# Patient Record
Sex: FEMALE | Race: WHITE | Employment: OTHER | URBAN - METROPOLITAN AREA
[De-identification: names, ages, dates, MRNs, and addresses within clinical notes are randomized per-mention and may not be internally consistent; named-entity substitution may affect disease eponyms.]

---

## 2017-03-09 ENCOUNTER — HOSPITAL ENCOUNTER (OUTPATIENT)
Dept: OTHER | Age: 67
Discharge: HOME | End: 2017-03-09
Attending: SURGERY | Admitting: SURGERY

## 2017-03-09 DIAGNOSIS — R52 PAIN: ICD-10-CM

## 2017-03-10 ENCOUNTER — HOSPITAL ENCOUNTER (OUTPATIENT)
Dept: VASCULAR LAB | Age: 67
Discharge: HOME | End: 2017-03-10
Attending: SURGERY | Admitting: SURGERY

## 2017-03-10 DIAGNOSIS — M79.604 PAIN OF RIGHT LEG: ICD-10-CM

## 2017-09-14 ENCOUNTER — HOSPITAL ENCOUNTER (OUTPATIENT)
Dept: VASCULAR LAB | Age: 67
Discharge: HOME | End: 2017-09-14
Attending: SURGERY | Admitting: SURGERY

## 2017-09-14 DIAGNOSIS — I73.9 PERIPHERAL VASCULAR DISEASE (HCC): ICD-10-CM

## 2017-11-30 ENCOUNTER — HOSPITAL ENCOUNTER (OUTPATIENT)
Dept: VASCULAR LAB | Age: 67
Discharge: HOME | End: 2017-11-30
Attending: SURGERY | Admitting: SURGERY

## 2017-11-30 DIAGNOSIS — M54.50 LOW BACK PAIN RADIATING TO BOTH LEGS: ICD-10-CM

## 2017-11-30 DIAGNOSIS — I73.9 PERIPHERAL VASCULAR DISEASE (HCC): ICD-10-CM

## 2017-11-30 DIAGNOSIS — M79.605 LOW BACK PAIN RADIATING TO BOTH LEGS: ICD-10-CM

## 2017-11-30 DIAGNOSIS — M79.604 LOW BACK PAIN RADIATING TO BOTH LEGS: ICD-10-CM

## 2019-10-07 ENCOUNTER — TELEPHONE (OUTPATIENT)
Dept: CARDIOLOGY CLINIC | Age: 69
End: 2019-10-07

## 2019-10-16 ENCOUNTER — HOSPITAL ENCOUNTER (OUTPATIENT)
Dept: VASCULAR LAB | Age: 69
Discharge: HOME | End: 2019-10-16
Payer: MEDICARE

## 2019-10-16 PROCEDURE — 93925 LOWER EXTREMITY STUDY: CPT

## 2019-10-18 ENCOUNTER — OFFICE VISIT (OUTPATIENT)
Dept: CARDIOLOGY CLINIC | Age: 69
End: 2019-10-18
Payer: MEDICARE

## 2019-10-18 VITALS
BODY MASS INDEX: 30.82 KG/M2 | DIASTOLIC BLOOD PRESSURE: 80 MMHG | WEIGHT: 185 LBS | HEIGHT: 65 IN | SYSTOLIC BLOOD PRESSURE: 152 MMHG | OXYGEN SATURATION: 98 % | HEART RATE: 94 BPM

## 2019-10-18 DIAGNOSIS — I73.9 INTERMITTENT CLAUDICATION (HCC): ICD-10-CM

## 2019-10-18 DIAGNOSIS — I70.219 ATHEROSCLEROSIS OF NATIVE ARTERY OF EXTREMITY WITH INTERMITTENT CLAUDICATION, UNSPECIFIED EXTREMITY (HCC): ICD-10-CM

## 2019-10-18 DIAGNOSIS — R93.6 ABNORMAL FINDINGS ON DIAGNOSTIC IMAGING OF LIMBS: ICD-10-CM

## 2019-10-18 DIAGNOSIS — I73.9 PAD (PERIPHERAL ARTERY DISEASE) (HCC): ICD-10-CM

## 2019-10-18 DIAGNOSIS — I70.229 CRITICAL LOWER LIMB ISCHEMIA (HCC): Primary | ICD-10-CM

## 2019-10-18 PROCEDURE — 99205 OFFICE O/P NEW HI 60 MIN: CPT | Performed by: INTERNAL MEDICINE

## 2019-10-21 ENCOUNTER — HOSPITAL ENCOUNTER (INPATIENT)
Dept: CARDIAC CATH/INVASIVE PROCEDURES | Age: 69
LOS: 2 days | Discharge: HOME | DRG: 271 | End: 2019-10-23
Attending: INTERNAL MEDICINE | Admitting: INTERNAL MEDICINE
Payer: MEDICARE

## 2019-10-21 DIAGNOSIS — I70.229 CRITICAL LOWER LIMB ISCHEMIA (HCC): ICD-10-CM

## 2019-10-21 PROBLEM — I73.9 PAD (PERIPHERAL ARTERY DISEASE) (HCC): Status: ACTIVE | Noted: 2019-10-21

## 2019-10-21 LAB
APTT: 177.1 SEC (ref 26–36)
APTT: 60.3 SEC (ref 26–36)
CALCIUM IONIZED: 1.24 MMOL/L (ref 1.12–1.32)
FIBRINOGEN: 152 MG/DL (ref 200–397)
FIBRINOGEN: 291 MG/DL (ref 200–397)
GFR AFRICAN AMERICAN: >60
GFR NON-AFRICAN AMERICAN: 55
GLUCOSE BLD-MCNC: 98 MG/DL (ref 70–99)
HCT VFR BLD CALC: 38.7 % (ref 36–48)
HCT VFR BLD CALC: 41 % (ref 36–48)
HCT VFR BLD CALC: 41.3 % (ref 36–48)
HCT VFR BLD CALC: 43.6 % (ref 36–48)
HEMOGLOBIN: 13.2 G/DL (ref 12–16)
HEMOGLOBIN: 13.9 G/DL (ref 12–16)
HEMOGLOBIN: 14 G/DL (ref 12–16)
HEMOGLOBIN: 15 G/DL (ref 12–16)
INR BLD: 1.07 (ref 0.86–1.14)
INR BLD: 1.08 (ref 0.86–1.14)
MCH RBC QN AUTO: 30.6 PG (ref 26–34)
MCH RBC QN AUTO: 30.9 PG (ref 26–34)
MCH RBC QN AUTO: 30.9 PG (ref 26–34)
MCH RBC QN AUTO: 31.1 PG (ref 26–34)
MCHC RBC AUTO-ENTMCNC: 33.5 G/DL (ref 31–36)
MCHC RBC AUTO-ENTMCNC: 34.1 G/DL (ref 31–36)
MCHC RBC AUTO-ENTMCNC: 34.3 G/DL (ref 31–36)
MCHC RBC AUTO-ENTMCNC: 34.4 G/DL (ref 31–36)
MCV RBC AUTO: 90 FL (ref 80–100)
MCV RBC AUTO: 90.6 FL (ref 80–100)
MCV RBC AUTO: 90.6 FL (ref 80–100)
MCV RBC AUTO: 91.2 FL (ref 80–100)
PDW BLD-RTO: 14.1 % (ref 12.4–15.4)
PDW BLD-RTO: 14.1 % (ref 12.4–15.4)
PDW BLD-RTO: 14.3 % (ref 12.4–15.4)
PDW BLD-RTO: 14.5 % (ref 12.4–15.4)
PERFORMED ON: ABNORMAL
PLATELET # BLD: 153 K/UL (ref 135–450)
PLATELET # BLD: 214 K/UL (ref 135–450)
PLATELET # BLD: 225 K/UL (ref 135–450)
PLATELET # BLD: 235 K/UL (ref 135–450)
PMV BLD AUTO: 7.7 FL (ref 5–10.5)
PMV BLD AUTO: 7.8 FL (ref 5–10.5)
PMV BLD AUTO: 7.9 FL (ref 5–10.5)
PMV BLD AUTO: 8.1 FL (ref 5–10.5)
POC ACT LR: 291 SEC
POC CHLORIDE: 106 MMOL/L (ref 99–110)
POC CREATININE: 1 MG/DL (ref 0.6–1.2)
POC POTASSIUM: 3.9 MMOL/L (ref 3.5–5.1)
POC SAMPLE TYPE: ABNORMAL
POC SODIUM: 142 MMOL/L (ref 136–145)
PROTHROMBIN TIME: 12.2 SEC (ref 9.8–13)
PROTHROMBIN TIME: 12.3 SEC (ref 9.8–13)
RBC # BLD: 4.27 M/UL (ref 4–5.2)
RBC # BLD: 4.52 M/UL (ref 4–5.2)
RBC # BLD: 4.53 M/UL (ref 4–5.2)
RBC # BLD: 4.85 M/UL (ref 4–5.2)
REASON FOR REJECTION: NORMAL
REJECTED TEST: NORMAL
WBC # BLD: 7.8 K/UL (ref 4–11)
WBC # BLD: 7.9 K/UL (ref 4–11)
WBC # BLD: 8 K/UL (ref 4–11)
WBC # BLD: 8.8 K/UL (ref 4–11)

## 2019-10-21 PROCEDURE — 37211 THROMBOLYTIC ART THERAPY: CPT

## 2019-10-21 PROCEDURE — 2580000003 HC RX 258

## 2019-10-21 PROCEDURE — 85730 THROMBOPLASTIN TIME PARTIAL: CPT

## 2019-10-21 PROCEDURE — 75710 ARTERY X-RAYS ARM/LEG: CPT | Performed by: INTERNAL MEDICINE

## 2019-10-21 PROCEDURE — 37184 PRIM ART M-THRMBC 1ST VSL: CPT | Performed by: INTERNAL MEDICINE

## 2019-10-21 PROCEDURE — B4101ZZ FLUOROSCOPY OF ABDOMINAL AORTA USING LOW OSMOLAR CONTRAST: ICD-10-PCS | Performed by: INTERNAL MEDICINE

## 2019-10-21 PROCEDURE — 85610 PROTHROMBIN TIME: CPT

## 2019-10-21 PROCEDURE — 75625 CONTRAST EXAM ABDOMINL AORTA: CPT | Performed by: INTERNAL MEDICINE

## 2019-10-21 PROCEDURE — 2100000000 HC CCU R&B

## 2019-10-21 PROCEDURE — 6370000000 HC RX 637 (ALT 250 FOR IP): Performed by: INTERNAL MEDICINE

## 2019-10-21 PROCEDURE — 85027 COMPLETE CBC AUTOMATED: CPT

## 2019-10-21 PROCEDURE — 2709999900 HC NON-CHARGEABLE SUPPLY

## 2019-10-21 PROCEDURE — 04CN3ZZ EXTIRPATION OF MATTER FROM LEFT POPLITEAL ARTERY, PERCUTANEOUS APPROACH: ICD-10-PCS | Performed by: INTERNAL MEDICINE

## 2019-10-21 PROCEDURE — C1751 CATH, INF, PER/CENT/MIDLINE: HCPCS

## 2019-10-21 PROCEDURE — 2580000003 HC RX 258: Performed by: INTERNAL MEDICINE

## 2019-10-21 PROCEDURE — C1894 INTRO/SHEATH, NON-LASER: HCPCS

## 2019-10-21 PROCEDURE — C1757 CATH, THROMBECTOMY/EMBOLECT: HCPCS

## 2019-10-21 PROCEDURE — 36247 INS CATH ABD/L-EXT ART 3RD: CPT

## 2019-10-21 PROCEDURE — 6360000004 HC RX CONTRAST MEDICATION: Performed by: INTERNAL MEDICINE

## 2019-10-21 PROCEDURE — 75625 CONTRAST EXAM ABDOMINL AORTA: CPT

## 2019-10-21 PROCEDURE — 2500000003 HC RX 250 WO HCPCS

## 2019-10-21 PROCEDURE — 82330 ASSAY OF CALCIUM: CPT

## 2019-10-21 PROCEDURE — 6360000002 HC RX W HCPCS

## 2019-10-21 PROCEDURE — 84295 ASSAY OF SERUM SODIUM: CPT

## 2019-10-21 PROCEDURE — 6360000002 HC RX W HCPCS: Performed by: INTERNAL MEDICINE

## 2019-10-21 PROCEDURE — 99152 MOD SED SAME PHYS/QHP 5/>YRS: CPT

## 2019-10-21 PROCEDURE — 75710 ARTERY X-RAYS ARM/LEG: CPT

## 2019-10-21 PROCEDURE — 82565 ASSAY OF CREATININE: CPT

## 2019-10-21 PROCEDURE — 3E05317 INTRODUCTION OF OTHER THROMBOLYTIC INTO PERIPHERAL ARTERY, PERCUTANEOUS APPROACH: ICD-10-PCS | Performed by: INTERNAL MEDICINE

## 2019-10-21 PROCEDURE — 82947 ASSAY GLUCOSE BLOOD QUANT: CPT

## 2019-10-21 PROCEDURE — 84132 ASSAY OF SERUM POTASSIUM: CPT

## 2019-10-21 PROCEDURE — 85384 FIBRINOGEN ACTIVITY: CPT

## 2019-10-21 PROCEDURE — 04CL3ZZ EXTIRPATION OF MATTER FROM LEFT FEMORAL ARTERY, PERCUTANEOUS APPROACH: ICD-10-PCS | Performed by: INTERNAL MEDICINE

## 2019-10-21 PROCEDURE — B41G1ZZ FLUOROSCOPY OF LEFT LOWER EXTREMITY ARTERIES USING LOW OSMOLAR CONTRAST: ICD-10-PCS | Performed by: INTERNAL MEDICINE

## 2019-10-21 PROCEDURE — 82435 ASSAY OF BLOOD CHLORIDE: CPT

## 2019-10-21 PROCEDURE — 75774 ARTERY X-RAY EACH VESSEL: CPT

## 2019-10-21 PROCEDURE — C1769 GUIDE WIRE: HCPCS

## 2019-10-21 PROCEDURE — 85347 COAGULATION TIME ACTIVATED: CPT

## 2019-10-21 PROCEDURE — 99153 MOD SED SAME PHYS/QHP EA: CPT

## 2019-10-21 PROCEDURE — C1884 EMBOLIZATION PROTECT SYST: HCPCS

## 2019-10-21 PROCEDURE — C1887 CATHETER, GUIDING: HCPCS

## 2019-10-21 RX ORDER — NICOTINE 21 MG/24HR
1 PATCH, TRANSDERMAL 24 HOURS TRANSDERMAL DAILY
Status: DISCONTINUED | OUTPATIENT
Start: 2019-10-21 | End: 2019-10-23 | Stop reason: HOSPADM

## 2019-10-21 RX ORDER — MORPHINE SULFATE 2 MG/ML
2 INJECTION, SOLUTION INTRAMUSCULAR; INTRAVENOUS EVERY 4 HOURS PRN
Status: DISCONTINUED | OUTPATIENT
Start: 2019-10-21 | End: 2019-10-23 | Stop reason: HOSPADM

## 2019-10-21 RX ORDER — CLOPIDOGREL BISULFATE 75 MG/1
75 TABLET ORAL DAILY
Status: DISCONTINUED | OUTPATIENT
Start: 2019-10-22 | End: 2019-10-23 | Stop reason: HOSPADM

## 2019-10-21 RX ORDER — PRAVASTATIN SODIUM 40 MG
40 TABLET ORAL DAILY
Status: DISCONTINUED | OUTPATIENT
Start: 2019-10-22 | End: 2019-10-23 | Stop reason: HOSPADM

## 2019-10-21 RX ORDER — HYDROCODONE BITARTRATE AND ACETAMINOPHEN 5; 325 MG/1; MG/1
1 TABLET ORAL EVERY 4 HOURS PRN
Status: DISCONTINUED | OUTPATIENT
Start: 2019-10-21 | End: 2019-10-23 | Stop reason: HOSPADM

## 2019-10-21 RX ORDER — CILOSTAZOL 50 MG/1
100 TABLET ORAL 2 TIMES DAILY
Status: DISCONTINUED | OUTPATIENT
Start: 2019-10-21 | End: 2019-10-23 | Stop reason: HOSPADM

## 2019-10-21 RX ORDER — CIPROFLOXACIN 500 MG/1
500 TABLET, FILM COATED ORAL 2 TIMES DAILY
Status: DISCONTINUED | OUTPATIENT
Start: 2019-10-21 | End: 2019-10-23 | Stop reason: HOSPADM

## 2019-10-21 RX ORDER — LOSARTAN POTASSIUM AND HYDROCHLOROTHIAZIDE 12.5; 5 MG/1; MG/1
2 TABLET ORAL DAILY
Status: DISCONTINUED | OUTPATIENT
Start: 2019-10-22 | End: 2019-10-23 | Stop reason: HOSPADM

## 2019-10-21 RX ORDER — AMLODIPINE BESYLATE 10 MG/1
10 TABLET ORAL DAILY
Status: DISCONTINUED | OUTPATIENT
Start: 2019-10-22 | End: 2019-10-23 | Stop reason: HOSPADM

## 2019-10-21 RX ORDER — HEPARIN SODIUM AND DEXTROSE 10000; 5 [USP'U]/100ML; G/100ML
500 INJECTION INTRAVENOUS CONTINUOUS
Status: DISCONTINUED | OUTPATIENT
Start: 2019-10-21 | End: 2019-10-22

## 2019-10-21 RX ORDER — SODIUM CHLORIDE 9 MG/ML
INJECTION, SOLUTION INTRAVENOUS CONTINUOUS
Status: DISCONTINUED | OUTPATIENT
Start: 2019-10-21 | End: 2019-10-23 | Stop reason: HOSPADM

## 2019-10-21 RX ORDER — PIOGLITAZONEHYDROCHLORIDE 15 MG/1
30 TABLET ORAL DAILY
Status: DISCONTINUED | OUTPATIENT
Start: 2019-10-22 | End: 2019-10-23 | Stop reason: HOSPADM

## 2019-10-21 RX ADMIN — HEPARIN SODIUM 500 UNITS/HR: 10000 INJECTION, SOLUTION INTRAVENOUS at 17:17

## 2019-10-21 RX ADMIN — IOPAMIDOL 120 ML: 755 INJECTION, SOLUTION INTRAVENOUS at 16:00

## 2019-10-21 RX ADMIN — HYDROCODONE BITARTRATE AND ACETAMINOPHEN 1 TABLET: 5; 325 TABLET ORAL at 19:52

## 2019-10-21 RX ADMIN — SODIUM CHLORIDE: 9 INJECTION, SOLUTION INTRAVENOUS at 17:17

## 2019-10-21 RX ADMIN — MORPHINE SULFATE 2 MG: 2 INJECTION, SOLUTION INTRAMUSCULAR; INTRAVENOUS at 21:39

## 2019-10-21 RX ADMIN — CIPROFLOXACIN HYDROCHLORIDE 500 MG: 500 TABLET, FILM COATED ORAL at 21:14

## 2019-10-21 RX ADMIN — CILOSTAZOL 100 MG: 50 TABLET ORAL at 21:14

## 2019-10-21 ASSESSMENT — PAIN DESCRIPTION - LOCATION
LOCATION: FOOT

## 2019-10-21 ASSESSMENT — PAIN DESCRIPTION - PAIN TYPE
TYPE: ACUTE PAIN

## 2019-10-21 ASSESSMENT — PAIN SCALES - GENERAL
PAINLEVEL_OUTOF10: 8
PAINLEVEL_OUTOF10: 7
PAINLEVEL_OUTOF10: 3
PAINLEVEL_OUTOF10: 4
PAINLEVEL_OUTOF10: 3

## 2019-10-21 ASSESSMENT — PAIN DESCRIPTION - ORIENTATION
ORIENTATION: LEFT

## 2019-10-21 ASSESSMENT — PAIN DESCRIPTION - DESCRIPTORS
DESCRIPTORS: BURNING

## 2019-10-22 LAB
APTT: 45 SEC (ref 26–36)
APTT: 51.3 SEC (ref 26–36)
FIBRINOGEN: 110 MG/DL (ref 200–397)
GLUCOSE BLD-MCNC: 117 MG/DL (ref 70–99)
GLUCOSE BLD-MCNC: 124 MG/DL (ref 70–99)
HCT VFR BLD CALC: 32.6 % (ref 36–48)
HCT VFR BLD CALC: 34.9 % (ref 36–48)
HCT VFR BLD CALC: 37.1 % (ref 36–48)
HEMOGLOBIN: 11.2 G/DL (ref 12–16)
HEMOGLOBIN: 11.9 G/DL (ref 12–16)
HEMOGLOBIN: 12.6 G/DL (ref 12–16)
INR BLD: 1.13 (ref 0.86–1.14)
INR BLD: 1.15 (ref 0.86–1.14)
INR BLD: 1.15 (ref 0.86–1.14)
MCH RBC QN AUTO: 30.8 PG (ref 26–34)
MCH RBC QN AUTO: 31 PG (ref 26–34)
MCH RBC QN AUTO: 31.1 PG (ref 26–34)
MCHC RBC AUTO-ENTMCNC: 34 G/DL (ref 31–36)
MCHC RBC AUTO-ENTMCNC: 34 G/DL (ref 31–36)
MCHC RBC AUTO-ENTMCNC: 34.4 G/DL (ref 31–36)
MCV RBC AUTO: 90.2 FL (ref 80–100)
MCV RBC AUTO: 90.8 FL (ref 80–100)
MCV RBC AUTO: 91.4 FL (ref 80–100)
PDW BLD-RTO: 14.1 % (ref 12.4–15.4)
PDW BLD-RTO: 14.5 % (ref 12.4–15.4)
PDW BLD-RTO: 14.6 % (ref 12.4–15.4)
PERFORMED ON: ABNORMAL
PERFORMED ON: ABNORMAL
PLATELET # BLD: 116 K/UL (ref 135–450)
PLATELET # BLD: 126 K/UL (ref 135–450)
PLATELET # BLD: 140 K/UL (ref 135–450)
PMV BLD AUTO: 7.6 FL (ref 5–10.5)
PMV BLD AUTO: 7.8 FL (ref 5–10.5)
PMV BLD AUTO: 7.9 FL (ref 5–10.5)
POC ACT LR: 184 SEC
PROTHROMBIN TIME: 12.9 SEC (ref 9.8–13)
PROTHROMBIN TIME: 13.1 SEC (ref 9.8–13)
PROTHROMBIN TIME: 13.1 SEC (ref 9.8–13)
RBC # BLD: 3.61 M/UL (ref 4–5.2)
RBC # BLD: 3.82 M/UL (ref 4–5.2)
RBC # BLD: 4.08 M/UL (ref 4–5.2)
WBC # BLD: 10.5 K/UL (ref 4–11)
WBC # BLD: 7.4 K/UL (ref 4–11)
WBC # BLD: 8.7 K/UL (ref 4–11)

## 2019-10-22 PROCEDURE — 6360000004 HC RX CONTRAST MEDICATION: Performed by: INTERNAL MEDICINE

## 2019-10-22 PROCEDURE — 99152 MOD SED SAME PHYS/QHP 5/>YRS: CPT

## 2019-10-22 PROCEDURE — C1769 GUIDE WIRE: HCPCS

## 2019-10-22 PROCEDURE — 85347 COAGULATION TIME ACTIVATED: CPT

## 2019-10-22 PROCEDURE — 85027 COMPLETE CBC AUTOMATED: CPT

## 2019-10-22 PROCEDURE — 2500000003 HC RX 250 WO HCPCS

## 2019-10-22 PROCEDURE — 6360000002 HC RX W HCPCS: Performed by: INTERNAL MEDICINE

## 2019-10-22 PROCEDURE — C1725 CATH, TRANSLUMIN NON-LASER: HCPCS

## 2019-10-22 PROCEDURE — 85730 THROMBOPLASTIN TIME PARTIAL: CPT

## 2019-10-22 PROCEDURE — 94761 N-INVAS EAR/PLS OXIMETRY MLT: CPT

## 2019-10-22 PROCEDURE — 6370000000 HC RX 637 (ALT 250 FOR IP): Performed by: INTERNAL MEDICINE

## 2019-10-22 PROCEDURE — 99153 MOD SED SAME PHYS/QHP EA: CPT

## 2019-10-22 PROCEDURE — 36415 COLL VENOUS BLD VENIPUNCTURE: CPT

## 2019-10-22 PROCEDURE — 75716 ARTERY X-RAYS ARMS/LEGS: CPT

## 2019-10-22 PROCEDURE — 37214 CESSJ THERAPY CATH REMOVAL: CPT

## 2019-10-22 PROCEDURE — 37224 HC FEM POP TERRITORY PLASTY: CPT

## 2019-10-22 PROCEDURE — 75774 ARTERY X-RAY EACH VESSEL: CPT

## 2019-10-22 PROCEDURE — 37214 CESSJ THERAPY CATH REMOVAL: CPT | Performed by: INTERNAL MEDICINE

## 2019-10-22 PROCEDURE — 85384 FIBRINOGEN ACTIVITY: CPT

## 2019-10-22 PROCEDURE — 37228 PR REVSC OPN/PRQ TIB/PERO W/ANGIOPLASTY UNI: CPT | Performed by: INTERNAL MEDICINE

## 2019-10-22 PROCEDURE — 067N3ZZ DILATION OF LEFT FEMORAL VEIN, PERCUTANEOUS APPROACH: ICD-10-PCS | Performed by: INTERNAL MEDICINE

## 2019-10-22 PROCEDURE — C1894 INTRO/SHEATH, NON-LASER: HCPCS

## 2019-10-22 PROCEDURE — 6360000002 HC RX W HCPCS

## 2019-10-22 PROCEDURE — 75716 ARTERY X-RAYS ARMS/LEGS: CPT | Performed by: INTERNAL MEDICINE

## 2019-10-22 PROCEDURE — 85610 PROTHROMBIN TIME: CPT

## 2019-10-22 PROCEDURE — C1887 CATHETER, GUIDING: HCPCS

## 2019-10-22 PROCEDURE — 2060000000 HC ICU INTERMEDIATE R&B

## 2019-10-22 RX ORDER — HEPARIN SODIUM 1000 [USP'U]/ML
60 INJECTION, SOLUTION INTRAVENOUS; SUBCUTANEOUS PRN
Status: DISCONTINUED | OUTPATIENT
Start: 2019-10-22 | End: 2019-10-22

## 2019-10-22 RX ORDER — PANTOPRAZOLE SODIUM 40 MG/1
40 TABLET, DELAYED RELEASE ORAL
Status: DISCONTINUED | OUTPATIENT
Start: 2019-10-23 | End: 2019-10-23 | Stop reason: HOSPADM

## 2019-10-22 RX ORDER — HEPARIN SODIUM 1000 [USP'U]/ML
60 INJECTION, SOLUTION INTRAVENOUS; SUBCUTANEOUS ONCE
Status: DISCONTINUED | OUTPATIENT
Start: 2019-10-22 | End: 2019-10-22

## 2019-10-22 RX ORDER — DEXTROSE MONOHYDRATE 50 MG/ML
100 INJECTION, SOLUTION INTRAVENOUS PRN
Status: DISCONTINUED | OUTPATIENT
Start: 2019-10-22 | End: 2019-10-23 | Stop reason: HOSPADM

## 2019-10-22 RX ORDER — HEPARIN SODIUM 10000 [USP'U]/100ML
11.8 INJECTION, SOLUTION INTRAVENOUS CONTINUOUS
Status: DISCONTINUED | OUTPATIENT
Start: 2019-10-22 | End: 2019-10-23

## 2019-10-22 RX ORDER — NICOTINE POLACRILEX 4 MG
15 LOZENGE BUCCAL PRN
Status: DISCONTINUED | OUTPATIENT
Start: 2019-10-22 | End: 2019-10-22 | Stop reason: SDUPTHER

## 2019-10-22 RX ORDER — SODIUM CHLORIDE 0.9 % (FLUSH) 0.9 %
10 SYRINGE (ML) INJECTION EVERY 12 HOURS SCHEDULED
Status: DISCONTINUED | OUTPATIENT
Start: 2019-10-22 | End: 2019-10-23 | Stop reason: HOSPADM

## 2019-10-22 RX ORDER — DEXTROSE MONOHYDRATE 25 G/50ML
12.5 INJECTION, SOLUTION INTRAVENOUS PRN
Status: DISCONTINUED | OUTPATIENT
Start: 2019-10-22 | End: 2019-10-22 | Stop reason: SDUPTHER

## 2019-10-22 RX ORDER — HEPARIN SODIUM 1000 [USP'U]/ML
30 INJECTION, SOLUTION INTRAVENOUS; SUBCUTANEOUS PRN
Status: DISCONTINUED | OUTPATIENT
Start: 2019-10-22 | End: 2019-10-22

## 2019-10-22 RX ORDER — NICOTINE POLACRILEX 4 MG
15 LOZENGE BUCCAL PRN
Status: DISCONTINUED | OUTPATIENT
Start: 2019-10-22 | End: 2019-10-23 | Stop reason: HOSPADM

## 2019-10-22 RX ORDER — DEXTROSE MONOHYDRATE 25 G/50ML
12.5 INJECTION, SOLUTION INTRAVENOUS PRN
Status: DISCONTINUED | OUTPATIENT
Start: 2019-10-22 | End: 2019-10-23 | Stop reason: HOSPADM

## 2019-10-22 RX ORDER — ACETAMINOPHEN 325 MG/1
650 TABLET ORAL EVERY 4 HOURS PRN
Status: DISCONTINUED | OUTPATIENT
Start: 2019-10-22 | End: 2019-10-23 | Stop reason: HOSPADM

## 2019-10-22 RX ORDER — DEXTROSE MONOHYDRATE 50 MG/ML
100 INJECTION, SOLUTION INTRAVENOUS PRN
Status: DISCONTINUED | OUTPATIENT
Start: 2019-10-22 | End: 2019-10-22 | Stop reason: SDUPTHER

## 2019-10-22 RX ORDER — SODIUM CHLORIDE 9 MG/ML
INJECTION, SOLUTION INTRAVENOUS CONTINUOUS
Status: DISCONTINUED | OUTPATIENT
Start: 2019-10-22 | End: 2019-10-22

## 2019-10-22 RX ORDER — SODIUM CHLORIDE 0.9 % (FLUSH) 0.9 %
10 SYRINGE (ML) INJECTION PRN
Status: DISCONTINUED | OUTPATIENT
Start: 2019-10-22 | End: 2019-10-23 | Stop reason: HOSPADM

## 2019-10-22 RX ORDER — BUPROPION HYDROCHLORIDE 150 MG/1
150 TABLET, EXTENDED RELEASE ORAL DAILY
Status: DISCONTINUED | OUTPATIENT
Start: 2019-10-22 | End: 2019-10-23 | Stop reason: HOSPADM

## 2019-10-22 RX ADMIN — PRAVASTATIN SODIUM 40 MG: 40 TABLET ORAL at 12:52

## 2019-10-22 RX ADMIN — HYDROCODONE BITARTRATE AND ACETAMINOPHEN 1 TABLET: 5; 325 TABLET ORAL at 04:03

## 2019-10-22 RX ADMIN — HEPARIN SODIUM 10 ML/HR: 10000 INJECTION, SOLUTION INTRAVENOUS at 15:54

## 2019-10-22 RX ADMIN — CIPROFLOXACIN HYDROCHLORIDE 500 MG: 500 TABLET, FILM COATED ORAL at 22:23

## 2019-10-22 RX ADMIN — AMLODIPINE BESYLATE 10 MG: 10 TABLET ORAL at 12:51

## 2019-10-22 RX ADMIN — MORPHINE SULFATE 2 MG: 2 INJECTION, SOLUTION INTRAMUSCULAR; INTRAVENOUS at 06:05

## 2019-10-22 RX ADMIN — MORPHINE SULFATE 2 MG: 2 INJECTION, SOLUTION INTRAMUSCULAR; INTRAVENOUS at 16:01

## 2019-10-22 RX ADMIN — BUPROPION HYDROCHLORIDE 150 MG: 150 TABLET, EXTENDED RELEASE ORAL at 12:51

## 2019-10-22 RX ADMIN — HYDROCODONE BITARTRATE AND ACETAMINOPHEN 1 TABLET: 5; 325 TABLET ORAL at 12:52

## 2019-10-22 RX ADMIN — CLOPIDOGREL BISULFATE 75 MG: 75 TABLET ORAL at 12:52

## 2019-10-22 RX ADMIN — CILOSTAZOL 100 MG: 50 TABLET ORAL at 13:00

## 2019-10-22 RX ADMIN — HYDROCODONE BITARTRATE AND ACETAMINOPHEN 1 TABLET: 5; 325 TABLET ORAL at 00:07

## 2019-10-22 RX ADMIN — CIPROFLOXACIN HYDROCHLORIDE 500 MG: 500 TABLET, FILM COATED ORAL at 12:52

## 2019-10-22 RX ADMIN — PIOGLITAZONE 30 MG: 15 TABLET ORAL at 13:00

## 2019-10-22 RX ADMIN — IOPAMIDOL 50 ML: 755 INJECTION, SOLUTION INTRAVENOUS at 10:29

## 2019-10-22 RX ADMIN — CILOSTAZOL 100 MG: 50 TABLET ORAL at 22:23

## 2019-10-22 ASSESSMENT — PAIN DESCRIPTION - DESCRIPTORS
DESCRIPTORS: SORE
DESCRIPTORS: SORE
DESCRIPTORS: BURNING
DESCRIPTORS: ACHING;BURNING
DESCRIPTORS: ACHING;BURNING
DESCRIPTORS: BURNING

## 2019-10-22 ASSESSMENT — PAIN DESCRIPTION - ORIENTATION
ORIENTATION: LOWER;LEFT
ORIENTATION: LOWER;LEFT
ORIENTATION: LEFT
ORIENTATION: LEFT
ORIENTATION: RIGHT
ORIENTATION: RIGHT;LOWER

## 2019-10-22 ASSESSMENT — PAIN DESCRIPTION - LOCATION
LOCATION: BACK;FOOT
LOCATION: GROIN;BACK
LOCATION: FOOT
LOCATION: GROIN
LOCATION: FOOT
LOCATION: BACK

## 2019-10-22 ASSESSMENT — PAIN DESCRIPTION - PAIN TYPE
TYPE: ACUTE PAIN
TYPE: CHRONIC PAIN

## 2019-10-22 ASSESSMENT — PAIN DESCRIPTION - FREQUENCY
FREQUENCY: CONTINUOUS

## 2019-10-22 ASSESSMENT — PAIN SCALES - GENERAL
PAINLEVEL_OUTOF10: 3
PAINLEVEL_OUTOF10: 0
PAINLEVEL_OUTOF10: 3
PAINLEVEL_OUTOF10: 7
PAINLEVEL_OUTOF10: 7
PAINLEVEL_OUTOF10: 3
PAINLEVEL_OUTOF10: 6
PAINLEVEL_OUTOF10: 7
PAINLEVEL_OUTOF10: 7

## 2019-10-22 ASSESSMENT — PAIN - FUNCTIONAL ASSESSMENT
PAIN_FUNCTIONAL_ASSESSMENT: ACTIVITIES ARE NOT PREVENTED
PAIN_FUNCTIONAL_ASSESSMENT: PREVENTS OR INTERFERES SOME ACTIVE ACTIVITIES AND ADLS

## 2019-10-22 ASSESSMENT — PAIN DESCRIPTION - ONSET
ONSET: GRADUAL

## 2019-10-22 ASSESSMENT — PAIN DESCRIPTION - PROGRESSION
CLINICAL_PROGRESSION: NOT CHANGED

## 2019-10-23 VITALS
WEIGHT: 199.3 LBS | DIASTOLIC BLOOD PRESSURE: 61 MMHG | OXYGEN SATURATION: 95 % | BODY MASS INDEX: 33.2 KG/M2 | TEMPERATURE: 98.2 F | SYSTOLIC BLOOD PRESSURE: 147 MMHG | HEIGHT: 65 IN | RESPIRATION RATE: 16 BRPM | HEART RATE: 102 BPM

## 2019-10-23 LAB
GLUCOSE BLD-MCNC: 123 MG/DL (ref 70–99)
GLUCOSE BLD-MCNC: 137 MG/DL (ref 70–99)
HCT VFR BLD CALC: 30.7 % (ref 36–48)
HEMOGLOBIN: 10.5 G/DL (ref 12–16)
INR BLD: 1.11 (ref 0.86–1.14)
MCH RBC QN AUTO: 31.1 PG (ref 26–34)
MCHC RBC AUTO-ENTMCNC: 34.3 G/DL (ref 31–36)
MCV RBC AUTO: 90.7 FL (ref 80–100)
PDW BLD-RTO: 14.3 % (ref 12.4–15.4)
PERFORMED ON: ABNORMAL
PERFORMED ON: ABNORMAL
PLATELET # BLD: 122 K/UL (ref 135–450)
PMV BLD AUTO: 7.7 FL (ref 5–10.5)
PROTHROMBIN TIME: 12.6 SEC (ref 9.8–13)
RBC # BLD: 3.38 M/UL (ref 4–5.2)
WBC # BLD: 7.9 K/UL (ref 4–11)

## 2019-10-23 PROCEDURE — 6360000002 HC RX W HCPCS: Performed by: INTERNAL MEDICINE

## 2019-10-23 PROCEDURE — 99238 HOSP IP/OBS DSCHRG MGMT 30/<: CPT | Performed by: NURSE PRACTITIONER

## 2019-10-23 PROCEDURE — 2580000003 HC RX 258: Performed by: INTERNAL MEDICINE

## 2019-10-23 PROCEDURE — 85027 COMPLETE CBC AUTOMATED: CPT

## 2019-10-23 PROCEDURE — 94760 N-INVAS EAR/PLS OXIMETRY 1: CPT

## 2019-10-23 PROCEDURE — 85610 PROTHROMBIN TIME: CPT

## 2019-10-23 PROCEDURE — 6370000000 HC RX 637 (ALT 250 FOR IP): Performed by: INTERNAL MEDICINE

## 2019-10-23 PROCEDURE — 36415 COLL VENOUS BLD VENIPUNCTURE: CPT

## 2019-10-23 RX ORDER — BUPROPION HYDROCHLORIDE 150 MG/1
150 TABLET, EXTENDED RELEASE ORAL DAILY
Qty: 30 TABLET | Refills: 0 | Status: SHIPPED | OUTPATIENT
Start: 2019-10-24 | End: 2020-02-11 | Stop reason: ALTCHOICE

## 2019-10-23 RX ORDER — HEPARIN SODIUM 1000 [USP'U]/ML
2000 INJECTION, SOLUTION INTRAVENOUS; SUBCUTANEOUS ONCE
Status: COMPLETED | OUTPATIENT
Start: 2019-10-23 | End: 2019-10-23

## 2019-10-23 RX ORDER — PANTOPRAZOLE SODIUM 40 MG/1
40 TABLET, DELAYED RELEASE ORAL
Qty: 30 TABLET | Refills: 0 | Status: SHIPPED | OUTPATIENT
Start: 2019-10-24 | End: 2019-12-05

## 2019-10-23 RX ORDER — CIPROFLOXACIN 500 MG/1
500 TABLET, FILM COATED ORAL 2 TIMES DAILY
Qty: 20 TABLET | Refills: 0 | Status: SHIPPED | OUTPATIENT
Start: 2019-10-23 | End: 2019-11-02

## 2019-10-23 RX ORDER — CLOPIDOGREL BISULFATE 75 MG/1
75 TABLET ORAL DAILY
Qty: 30 TABLET | Refills: 3 | Status: SHIPPED | OUTPATIENT
Start: 2019-10-24 | End: 2020-08-31

## 2019-10-23 RX ADMIN — PRAVASTATIN SODIUM 40 MG: 40 TABLET ORAL at 10:13

## 2019-10-23 RX ADMIN — HEPARIN SODIUM 2000 UNITS: 1000 INJECTION, SOLUTION INTRAVENOUS; SUBCUTANEOUS at 02:13

## 2019-10-23 RX ADMIN — CILOSTAZOL 100 MG: 50 TABLET ORAL at 10:13

## 2019-10-23 RX ADMIN — AMLODIPINE BESYLATE 10 MG: 10 TABLET ORAL at 10:13

## 2019-10-23 RX ADMIN — PANTOPRAZOLE SODIUM 40 MG: 40 TABLET, DELAYED RELEASE ORAL at 10:14

## 2019-10-23 RX ADMIN — SODIUM CHLORIDE: 9 INJECTION, SOLUTION INTRAVENOUS at 04:51

## 2019-10-23 RX ADMIN — Medication 10 ML: at 10:24

## 2019-10-23 RX ADMIN — BUPROPION HYDROCHLORIDE 150 MG: 150 TABLET, EXTENDED RELEASE ORAL at 10:13

## 2019-10-23 RX ADMIN — LOSARTAN POTASSIUM AND HYDROCHLOROTHIAZIDE 2 TABLET: 50; 12.5 TABLET, FILM COATED ORAL at 10:13

## 2019-10-23 RX ADMIN — CIPROFLOXACIN HYDROCHLORIDE 500 MG: 500 TABLET, FILM COATED ORAL at 10:13

## 2019-10-23 RX ADMIN — CLOPIDOGREL BISULFATE 75 MG: 75 TABLET ORAL at 10:14

## 2019-10-23 RX ADMIN — PIOGLITAZONE 30 MG: 15 TABLET ORAL at 10:13

## 2019-10-23 ASSESSMENT — PAIN SCALES - GENERAL: PAINLEVEL_OUTOF10: 0

## 2019-10-24 ENCOUNTER — TELEPHONE (OUTPATIENT)
Dept: CARDIOLOGY CLINIC | Age: 69
End: 2019-10-24

## 2019-10-28 ENCOUNTER — OFFICE VISIT (OUTPATIENT)
Dept: CARDIOLOGY CLINIC | Age: 69
End: 2019-10-28
Payer: MEDICARE

## 2019-10-28 VITALS
OXYGEN SATURATION: 98 % | HEIGHT: 65 IN | WEIGHT: 185 LBS | DIASTOLIC BLOOD PRESSURE: 80 MMHG | HEART RATE: 96 BPM | SYSTOLIC BLOOD PRESSURE: 132 MMHG | BODY MASS INDEX: 30.82 KG/M2

## 2019-10-28 DIAGNOSIS — E78.5 HYPERLIPIDEMIA LDL GOAL <70: ICD-10-CM

## 2019-10-28 DIAGNOSIS — Z72.0 TOBACCO USE: ICD-10-CM

## 2019-10-28 DIAGNOSIS — R60.0 LEG EDEMA, LEFT: ICD-10-CM

## 2019-10-28 DIAGNOSIS — I10 ESSENTIAL HYPERTENSION: ICD-10-CM

## 2019-10-28 DIAGNOSIS — I73.9 PAD (PERIPHERAL ARTERY DISEASE) (HCC): Primary | ICD-10-CM

## 2019-10-28 PROCEDURE — 99214 OFFICE O/P EST MOD 30 MIN: CPT | Performed by: NURSE PRACTITIONER

## 2019-10-30 ENCOUNTER — TELEPHONE (OUTPATIENT)
Dept: CARDIOLOGY CLINIC | Age: 69
End: 2019-10-30

## 2019-11-01 ENCOUNTER — HOSPITAL ENCOUNTER (OUTPATIENT)
Dept: VASCULAR LAB | Age: 69
Discharge: HOME | End: 2019-11-01
Payer: MEDICARE

## 2019-11-01 DIAGNOSIS — I73.9 PAD (PERIPHERAL ARTERY DISEASE) (HCC): ICD-10-CM

## 2019-11-01 DIAGNOSIS — R60.0 LEG EDEMA, LEFT: ICD-10-CM

## 2019-11-01 PROCEDURE — 93925 LOWER EXTREMITY STUDY: CPT

## 2019-11-06 ENCOUNTER — TELEPHONE (OUTPATIENT)
Dept: CARDIOLOGY CLINIC | Age: 69
End: 2019-11-06

## 2019-11-06 DIAGNOSIS — I73.9 PAD (PERIPHERAL ARTERY DISEASE) (HCC): Primary | ICD-10-CM

## 2019-11-07 ENCOUNTER — HOSPITAL ENCOUNTER (OUTPATIENT)
Age: 69
Discharge: HOME | End: 2019-11-07
Payer: MEDICARE

## 2019-11-07 ENCOUNTER — OFFICE VISIT (OUTPATIENT)
Dept: CARDIOLOGY CLINIC | Age: 69
End: 2019-11-07
Payer: MEDICARE

## 2019-11-07 ENCOUNTER — HOSPITAL ENCOUNTER (OUTPATIENT)
Dept: GENERAL RADIOLOGY | Age: 69
Discharge: HOME | End: 2019-11-07
Payer: MEDICARE

## 2019-11-07 VITALS
DIASTOLIC BLOOD PRESSURE: 80 MMHG | HEIGHT: 65 IN | SYSTOLIC BLOOD PRESSURE: 126 MMHG | WEIGHT: 185 LBS | HEART RATE: 92 BPM | BODY MASS INDEX: 30.82 KG/M2 | OXYGEN SATURATION: 96 %

## 2019-11-07 DIAGNOSIS — I73.9 INTERMITTENT CLAUDICATION (HCC): ICD-10-CM

## 2019-11-07 DIAGNOSIS — M54.42 ACUTE LEFT-SIDED LOW BACK PAIN WITH LEFT-SIDED SCIATICA: ICD-10-CM

## 2019-11-07 DIAGNOSIS — E78.2 MIXED HYPERLIPIDEMIA: ICD-10-CM

## 2019-11-07 DIAGNOSIS — R60.0 LEG EDEMA, LEFT: ICD-10-CM

## 2019-11-07 DIAGNOSIS — Z72.0 TOBACCO USE: ICD-10-CM

## 2019-11-07 DIAGNOSIS — I10 ESSENTIAL HYPERTENSION: ICD-10-CM

## 2019-11-07 DIAGNOSIS — I73.9 PAD (PERIPHERAL ARTERY DISEASE) (HCC): Primary | ICD-10-CM

## 2019-11-07 PROCEDURE — 99214 OFFICE O/P EST MOD 30 MIN: CPT | Performed by: INTERNAL MEDICINE

## 2019-11-07 PROCEDURE — 72110 X-RAY EXAM L-2 SPINE 4/>VWS: CPT

## 2019-11-07 PROCEDURE — 72100 X-RAY EXAM L-S SPINE 2/3 VWS: CPT

## 2019-11-07 PROCEDURE — 73564 X-RAY EXAM KNEE 4 OR MORE: CPT

## 2019-11-07 PROCEDURE — 73560 X-RAY EXAM OF KNEE 1 OR 2: CPT

## 2019-11-07 RX ORDER — HYDROCODONE BITARTRATE AND ACETAMINOPHEN 10; 325 MG/1; MG/1
1 TABLET ORAL EVERY 6 HOURS PRN
Status: ON HOLD | COMMUNITY
End: 2019-11-14 | Stop reason: HOSPADM

## 2019-11-08 ENCOUNTER — TELEPHONE (OUTPATIENT)
Dept: CARDIOLOGY CLINIC | Age: 69
End: 2019-11-08

## 2019-11-11 ENCOUNTER — HOSPITAL ENCOUNTER (INPATIENT)
Age: 69
LOS: 1 days | Discharge: HOME | DRG: 552 | End: 2019-11-14
Attending: SPECIALIST | Admitting: SPECIALIST
Payer: MEDICARE

## 2019-11-11 DIAGNOSIS — M79.605 PAIN OF LEFT LOWER EXTREMITY: ICD-10-CM

## 2019-11-11 DIAGNOSIS — R52 INTRACTABLE PAIN: Primary | ICD-10-CM

## 2019-11-11 PROBLEM — M79.606 LEG PAIN: Status: ACTIVE | Noted: 2019-11-11

## 2019-11-11 LAB
ANION GAP SERPL CALCULATED.3IONS-SCNC: 12 MMOL/L (ref 3–16)
BASOPHILS ABSOLUTE: 0.1 K/UL (ref 0–0.2)
BASOPHILS RELATIVE PERCENT: 1.1 %
BUN BLDV-MCNC: 19 MG/DL (ref 7–20)
C-REACTIVE PROTEIN: 4.1 MG/L (ref 0–5.1)
CALCIUM SERPL-MCNC: 9.7 MG/DL (ref 8.3–10.6)
CHLORIDE BLD-SCNC: 104 MMOL/L (ref 99–110)
CO2: 25 MMOL/L (ref 21–32)
CREAT SERPL-MCNC: 0.9 MG/DL (ref 0.6–1.2)
EOSINOPHILS ABSOLUTE: 0.2 K/UL (ref 0–0.6)
EOSINOPHILS RELATIVE PERCENT: 1.8 %
GFR AFRICAN AMERICAN: >60
GFR NON-AFRICAN AMERICAN: >60
GLUCOSE BLD-MCNC: 107 MG/DL (ref 70–99)
GLUCOSE BLD-MCNC: 131 MG/DL (ref 70–99)
HCT VFR BLD CALC: 40.9 % (ref 36–48)
HEMOGLOBIN: 13.9 G/DL (ref 12–16)
INR BLD: 1.4 (ref 0.86–1.14)
LYMPHOCYTES ABSOLUTE: 1.9 K/UL (ref 1–5.1)
LYMPHOCYTES RELATIVE PERCENT: 21 %
MCH RBC QN AUTO: 31.1 PG (ref 26–34)
MCHC RBC AUTO-ENTMCNC: 33.9 G/DL (ref 31–36)
MCV RBC AUTO: 91.7 FL (ref 80–100)
MONOCYTES ABSOLUTE: 0.6 K/UL (ref 0–1.3)
MONOCYTES RELATIVE PERCENT: 6.9 %
NEUTROPHILS ABSOLUTE: 6.3 K/UL (ref 1.7–7.7)
NEUTROPHILS RELATIVE PERCENT: 69.2 %
PDW BLD-RTO: 14.7 % (ref 12.4–15.4)
PERFORMED ON: ABNORMAL
PLATELET # BLD: 263 K/UL (ref 135–450)
PMV BLD AUTO: 7.7 FL (ref 5–10.5)
POTASSIUM SERPL-SCNC: 3.8 MMOL/L (ref 3.5–5.1)
PROTHROMBIN TIME: 16 SEC (ref 9.8–13)
RBC # BLD: 4.45 M/UL (ref 4–5.2)
SEDIMENTATION RATE, ERYTHROCYTE: 15 MM/HR (ref 0–30)
SODIUM BLD-SCNC: 141 MMOL/L (ref 136–145)
WBC # BLD: 9.1 K/UL (ref 4–11)

## 2019-11-11 PROCEDURE — 96374 THER/PROPH/DIAG INJ IV PUSH: CPT

## 2019-11-11 PROCEDURE — 6360000002 HC RX W HCPCS: Performed by: NURSE PRACTITIONER

## 2019-11-11 PROCEDURE — 85025 COMPLETE CBC W/AUTO DIFF WBC: CPT

## 2019-11-11 PROCEDURE — G0378 HOSPITAL OBSERVATION PER HR: HCPCS

## 2019-11-11 PROCEDURE — 85652 RBC SED RATE AUTOMATED: CPT

## 2019-11-11 PROCEDURE — 6370000000 HC RX 637 (ALT 250 FOR IP): Performed by: SPECIALIST

## 2019-11-11 PROCEDURE — 6360000002 HC RX W HCPCS: Performed by: SPECIALIST

## 2019-11-11 PROCEDURE — 6370000000 HC RX 637 (ALT 250 FOR IP): Performed by: NURSE PRACTITIONER

## 2019-11-11 PROCEDURE — 86140 C-REACTIVE PROTEIN: CPT

## 2019-11-11 PROCEDURE — 80048 BASIC METABOLIC PNL TOTAL CA: CPT

## 2019-11-11 PROCEDURE — 85610 PROTHROMBIN TIME: CPT

## 2019-11-11 PROCEDURE — 96376 TX/PRO/DX INJ SAME DRUG ADON: CPT

## 2019-11-11 PROCEDURE — 99285 EMERGENCY DEPT VISIT HI MDM: CPT

## 2019-11-11 PROCEDURE — 96375 TX/PRO/DX INJ NEW DRUG ADDON: CPT

## 2019-11-11 RX ORDER — DEXAMETHASONE SODIUM PHOSPHATE 4 MG/ML
10 INJECTION, SOLUTION INTRA-ARTICULAR; INTRALESIONAL; INTRAMUSCULAR; INTRAVENOUS; SOFT TISSUE ONCE
Status: DISCONTINUED | OUTPATIENT
Start: 2019-11-11 | End: 2019-11-11

## 2019-11-11 RX ORDER — PRAVASTATIN SODIUM 40 MG
40 TABLET ORAL DAILY
Status: DISCONTINUED | OUTPATIENT
Start: 2019-11-12 | End: 2019-11-14 | Stop reason: HOSPADM

## 2019-11-11 RX ORDER — MORPHINE SULFATE 2 MG/ML
4 INJECTION, SOLUTION INTRAMUSCULAR; INTRAVENOUS EVERY 4 HOURS PRN
Status: DISCONTINUED | OUTPATIENT
Start: 2019-11-11 | End: 2019-11-14 | Stop reason: HOSPADM

## 2019-11-11 RX ORDER — MORPHINE SULFATE 4 MG/ML
4 INJECTION, SOLUTION INTRAMUSCULAR; INTRAVENOUS EVERY 4 HOURS PRN
Status: DISCONTINUED | OUTPATIENT
Start: 2019-11-11 | End: 2019-11-11

## 2019-11-11 RX ORDER — BUPROPION HYDROCHLORIDE 150 MG/1
150 TABLET, EXTENDED RELEASE ORAL DAILY
Status: DISCONTINUED | OUTPATIENT
Start: 2019-11-12 | End: 2019-11-14 | Stop reason: HOSPADM

## 2019-11-11 RX ORDER — CLONIDINE HYDROCHLORIDE 0.1 MG/1
0.1 TABLET ORAL ONCE
Status: CANCELLED | OUTPATIENT
Start: 2019-11-11

## 2019-11-11 RX ORDER — ORPHENADRINE CITRATE 30 MG/ML
30 INJECTION INTRAMUSCULAR; INTRAVENOUS ONCE
Status: COMPLETED | OUTPATIENT
Start: 2019-11-11 | End: 2019-11-11

## 2019-11-11 RX ORDER — MORPHINE SULFATE 2 MG/ML
6 INJECTION, SOLUTION INTRAMUSCULAR; INTRAVENOUS EVERY 4 HOURS PRN
Status: DISCONTINUED | OUTPATIENT
Start: 2019-11-11 | End: 2019-11-14 | Stop reason: HOSPADM

## 2019-11-11 RX ORDER — ACETAMINOPHEN 500 MG
500 TABLET ORAL EVERY 6 HOURS PRN
Status: DISCONTINUED | OUTPATIENT
Start: 2019-11-11 | End: 2019-11-14 | Stop reason: HOSPADM

## 2019-11-11 RX ORDER — MORPHINE SULFATE 10 MG/ML
6 INJECTION, SOLUTION INTRAMUSCULAR; INTRAVENOUS EVERY 4 HOURS PRN
Status: DISCONTINUED | OUTPATIENT
Start: 2019-11-11 | End: 2019-11-11 | Stop reason: SDUPTHER

## 2019-11-11 RX ORDER — MORPHINE SULFATE 4 MG/ML
4 INJECTION, SOLUTION INTRAMUSCULAR; INTRAVENOUS EVERY 4 HOURS PRN
Status: DISCONTINUED | OUTPATIENT
Start: 2019-11-11 | End: 2019-11-11 | Stop reason: SDUPTHER

## 2019-11-11 RX ORDER — LIDOCAINE 4 G/G
1 PATCH TOPICAL DAILY
Status: DISCONTINUED | OUTPATIENT
Start: 2019-11-11 | End: 2019-11-14 | Stop reason: HOSPADM

## 2019-11-11 RX ORDER — PANTOPRAZOLE SODIUM 40 MG/1
40 TABLET, DELAYED RELEASE ORAL
Status: DISCONTINUED | OUTPATIENT
Start: 2019-11-12 | End: 2019-11-14 | Stop reason: HOSPADM

## 2019-11-11 RX ORDER — MORPHINE SULFATE 2 MG/ML
2 INJECTION, SOLUTION INTRAMUSCULAR; INTRAVENOUS EVERY 4 HOURS PRN
Status: DISCONTINUED | OUTPATIENT
Start: 2019-11-11 | End: 2019-11-11 | Stop reason: SDUPTHER

## 2019-11-11 RX ORDER — ORPHENADRINE CITRATE 30 MG/ML
60 INJECTION INTRAMUSCULAR; INTRAVENOUS ONCE
Status: DISCONTINUED | OUTPATIENT
Start: 2019-11-11 | End: 2019-11-11

## 2019-11-11 RX ORDER — DEXAMETHASONE SODIUM PHOSPHATE 10 MG/ML
6 INJECTION INTRAMUSCULAR; INTRAVENOUS ONCE
Status: COMPLETED | OUTPATIENT
Start: 2019-11-11 | End: 2019-11-11

## 2019-11-11 RX ORDER — MORPHINE SULFATE 10 MG/ML
6 INJECTION, SOLUTION INTRAMUSCULAR; INTRAVENOUS ONCE
Status: DISCONTINUED | OUTPATIENT
Start: 2019-11-11 | End: 2019-11-11

## 2019-11-11 RX ORDER — MORPHINE SULFATE 4 MG/ML
6 INJECTION, SOLUTION INTRAMUSCULAR; INTRAVENOUS EVERY 4 HOURS PRN
Status: DISCONTINUED | OUTPATIENT
Start: 2019-11-11 | End: 2019-11-11 | Stop reason: SDUPTHER

## 2019-11-11 RX ORDER — AMLODIPINE BESYLATE 10 MG/1
10 TABLET ORAL DAILY
Status: DISCONTINUED | OUTPATIENT
Start: 2019-11-12 | End: 2019-11-14 | Stop reason: HOSPADM

## 2019-11-11 RX ORDER — MORPHINE SULFATE 2 MG/ML
2 INJECTION, SOLUTION INTRAMUSCULAR; INTRAVENOUS EVERY 4 HOURS PRN
Status: DISCONTINUED | OUTPATIENT
Start: 2019-11-11 | End: 2019-11-14 | Stop reason: HOSPADM

## 2019-11-11 RX ORDER — ACETAMINOPHEN 500 MG
500 TABLET ORAL EVERY 6 HOURS PRN
COMMUNITY

## 2019-11-11 RX ORDER — MORPHINE SULFATE 4 MG/ML
4 INJECTION, SOLUTION INTRAMUSCULAR; INTRAVENOUS ONCE
Status: COMPLETED | OUTPATIENT
Start: 2019-11-11 | End: 2019-11-11

## 2019-11-11 RX ORDER — CLOPIDOGREL BISULFATE 75 MG/1
75 TABLET ORAL DAILY
Status: DISCONTINUED | OUTPATIENT
Start: 2019-11-12 | End: 2019-11-14 | Stop reason: HOSPADM

## 2019-11-11 RX ORDER — MORPHINE SULFATE 2 MG/ML
4 INJECTION, SOLUTION INTRAMUSCULAR; INTRAVENOUS EVERY 4 HOURS PRN
Status: DISCONTINUED | OUTPATIENT
Start: 2019-11-11 | End: 2019-11-11 | Stop reason: SDUPTHER

## 2019-11-11 RX ADMIN — ORPHENADRINE CITRATE 30 MG: 30 INJECTION INTRAMUSCULAR; INTRAVENOUS at 15:28

## 2019-11-11 RX ADMIN — MORPHINE SULFATE 4 MG: 4 INJECTION INTRAVENOUS at 15:28

## 2019-11-11 RX ADMIN — DEXAMETHASONE SODIUM PHOSPHATE 6 MG: 10 INJECTION INTRAMUSCULAR; INTRAVENOUS at 15:29

## 2019-11-11 RX ADMIN — APIXABAN 5 MG: 5 TABLET, FILM COATED ORAL at 20:15

## 2019-11-11 RX ADMIN — MORPHINE SULFATE 4 MG: 2 INJECTION, SOLUTION INTRAMUSCULAR; INTRAVENOUS at 20:15

## 2019-11-11 ASSESSMENT — PAIN DESCRIPTION - LOCATION
LOCATION: HIP
LOCATION: LEG
LOCATION: LEG

## 2019-11-11 ASSESSMENT — PAIN - FUNCTIONAL ASSESSMENT
PAIN_FUNCTIONAL_ASSESSMENT: ACTIVITIES ARE NOT PREVENTED
PAIN_FUNCTIONAL_ASSESSMENT: ACTIVITIES ARE NOT PREVENTED

## 2019-11-11 ASSESSMENT — PAIN DESCRIPTION - ORIENTATION
ORIENTATION: LEFT

## 2019-11-11 ASSESSMENT — PAIN DESCRIPTION - DESCRIPTORS
DESCRIPTORS: ACHING

## 2019-11-11 ASSESSMENT — PAIN DESCRIPTION - PROGRESSION
CLINICAL_PROGRESSION: GRADUALLY WORSENING
CLINICAL_PROGRESSION: NOT CHANGED
CLINICAL_PROGRESSION: GRADUALLY IMPROVING

## 2019-11-11 ASSESSMENT — PAIN SCALES - GENERAL
PAINLEVEL_OUTOF10: 8
PAINLEVEL_OUTOF10: 6
PAINLEVEL_OUTOF10: 2
PAINLEVEL_OUTOF10: 8
PAINLEVEL_OUTOF10: 4
PAINLEVEL_OUTOF10: 7

## 2019-11-11 ASSESSMENT — PAIN SCALES - WONG BAKER: WONGBAKER_NUMERICALRESPONSE: 8

## 2019-11-11 ASSESSMENT — ENCOUNTER SYMPTOMS
RESPIRATORY NEGATIVE: 1
GASTROINTESTINAL NEGATIVE: 1

## 2019-11-11 ASSESSMENT — PAIN DESCRIPTION - ONSET
ONSET: ON-GOING

## 2019-11-11 ASSESSMENT — PAIN DESCRIPTION - FREQUENCY
FREQUENCY: CONTINUOUS

## 2019-11-11 ASSESSMENT — PAIN DESCRIPTION - PAIN TYPE
TYPE: ACUTE PAIN

## 2019-11-12 ENCOUNTER — APPOINTMENT (OUTPATIENT)
Dept: CT IMAGING | Age: 69
DRG: 552 | End: 2019-11-12
Payer: MEDICARE

## 2019-11-12 ENCOUNTER — APPOINTMENT (OUTPATIENT)
Dept: MRI IMAGING | Age: 69
DRG: 552 | End: 2019-11-12
Payer: MEDICARE

## 2019-11-12 LAB
GLUCOSE BLD-MCNC: 101 MG/DL (ref 70–99)
GLUCOSE BLD-MCNC: 122 MG/DL (ref 70–99)
PERFORMED ON: ABNORMAL
PERFORMED ON: ABNORMAL

## 2019-11-12 PROCEDURE — 6360000002 HC RX W HCPCS: Performed by: SPECIALIST

## 2019-11-12 PROCEDURE — G0378 HOSPITAL OBSERVATION PER HR: HCPCS

## 2019-11-12 PROCEDURE — 6360000004 HC RX CONTRAST MEDICATION: Performed by: INTERNAL MEDICINE

## 2019-11-12 PROCEDURE — 6370000000 HC RX 637 (ALT 250 FOR IP): Performed by: SPECIALIST

## 2019-11-12 PROCEDURE — 72148 MRI LUMBAR SPINE W/O DYE: CPT

## 2019-11-12 PROCEDURE — 6370000000 HC RX 637 (ALT 250 FOR IP): Performed by: NURSE PRACTITIONER

## 2019-11-12 PROCEDURE — 96376 TX/PRO/DX INJ SAME DRUG ADON: CPT

## 2019-11-12 PROCEDURE — 94760 N-INVAS EAR/PLS OXIMETRY 1: CPT

## 2019-11-12 PROCEDURE — 93926 LOWER EXTREMITY STUDY: CPT

## 2019-11-12 PROCEDURE — 75635 CT ANGIO ABDOMINAL ARTERIES: CPT

## 2019-11-12 RX ORDER — OXYCODONE HYDROCHLORIDE AND ACETAMINOPHEN 5; 325 MG/1; MG/1
1 TABLET ORAL EVERY 6 HOURS PRN
Status: DISCONTINUED | OUTPATIENT
Start: 2019-11-12 | End: 2019-11-14 | Stop reason: HOSPADM

## 2019-11-12 RX ORDER — DEXTROSE MONOHYDRATE 50 MG/ML
100 INJECTION, SOLUTION INTRAVENOUS PRN
Status: DISCONTINUED | OUTPATIENT
Start: 2019-11-12 | End: 2019-11-14 | Stop reason: HOSPADM

## 2019-11-12 RX ORDER — DEXTROSE MONOHYDRATE 25 G/50ML
12.5 INJECTION, SOLUTION INTRAVENOUS PRN
Status: DISCONTINUED | OUTPATIENT
Start: 2019-11-12 | End: 2019-11-14 | Stop reason: HOSPADM

## 2019-11-12 RX ORDER — NICOTINE POLACRILEX 4 MG
15 LOZENGE BUCCAL PRN
Status: DISCONTINUED | OUTPATIENT
Start: 2019-11-12 | End: 2019-11-14 | Stop reason: HOSPADM

## 2019-11-12 RX ADMIN — CLOPIDOGREL BISULFATE 75 MG: 75 TABLET ORAL at 09:29

## 2019-11-12 RX ADMIN — OXYCODONE HYDROCHLORIDE AND ACETAMINOPHEN 1 TABLET: 5; 325 TABLET ORAL at 16:23

## 2019-11-12 RX ADMIN — PRAVASTATIN SODIUM 40 MG: 40 TABLET ORAL at 09:29

## 2019-11-12 RX ADMIN — IOPAMIDOL 75 ML: 755 INJECTION, SOLUTION INTRAVENOUS at 10:59

## 2019-11-12 RX ADMIN — APIXABAN 5 MG: 5 TABLET, FILM COATED ORAL at 22:03

## 2019-11-12 RX ADMIN — BUPROPION HYDROCHLORIDE 150 MG: 150 TABLET, EXTENDED RELEASE ORAL at 09:29

## 2019-11-12 RX ADMIN — AMLODIPINE BESYLATE 10 MG: 10 TABLET ORAL at 09:29

## 2019-11-12 RX ADMIN — MORPHINE SULFATE 4 MG: 2 INJECTION, SOLUTION INTRAMUSCULAR; INTRAVENOUS at 02:12

## 2019-11-12 RX ADMIN — MORPHINE SULFATE 4 MG: 2 INJECTION, SOLUTION INTRAMUSCULAR; INTRAVENOUS at 11:58

## 2019-11-12 RX ADMIN — PANTOPRAZOLE SODIUM 40 MG: 40 TABLET, DELAYED RELEASE ORAL at 06:02

## 2019-11-12 RX ADMIN — APIXABAN 5 MG: 5 TABLET, FILM COATED ORAL at 09:29

## 2019-11-12 RX ADMIN — OXYCODONE HYDROCHLORIDE AND ACETAMINOPHEN 1 TABLET: 5; 325 TABLET ORAL at 22:25

## 2019-11-12 RX ADMIN — MORPHINE SULFATE 4 MG: 2 INJECTION, SOLUTION INTRAMUSCULAR; INTRAVENOUS at 06:08

## 2019-11-12 ASSESSMENT — PAIN SCALES - GENERAL
PAINLEVEL_OUTOF10: 3
PAINLEVEL_OUTOF10: 6
PAINLEVEL_OUTOF10: 6
PAINLEVEL_OUTOF10: 3
PAINLEVEL_OUTOF10: 6
PAINLEVEL_OUTOF10: 4
PAINLEVEL_OUTOF10: 0
PAINLEVEL_OUTOF10: 4
PAINLEVEL_OUTOF10: 5
PAINLEVEL_OUTOF10: 6
PAINLEVEL_OUTOF10: 6

## 2019-11-12 ASSESSMENT — PAIN DESCRIPTION - PAIN TYPE
TYPE: ACUTE PAIN

## 2019-11-12 ASSESSMENT — PAIN DESCRIPTION - FREQUENCY
FREQUENCY: CONTINUOUS

## 2019-11-12 ASSESSMENT — PAIN DESCRIPTION - PROGRESSION
CLINICAL_PROGRESSION: NOT CHANGED
CLINICAL_PROGRESSION: NOT CHANGED
CLINICAL_PROGRESSION: GRADUALLY WORSENING
CLINICAL_PROGRESSION: NOT CHANGED
CLINICAL_PROGRESSION: GRADUALLY WORSENING

## 2019-11-12 ASSESSMENT — PAIN DESCRIPTION - ONSET
ONSET: ON-GOING

## 2019-11-12 ASSESSMENT — PAIN DESCRIPTION - LOCATION
LOCATION: LEG

## 2019-11-12 ASSESSMENT — PAIN DESCRIPTION - DESCRIPTORS
DESCRIPTORS: ACHING

## 2019-11-12 ASSESSMENT — PAIN DESCRIPTION - ORIENTATION
ORIENTATION: LEFT

## 2019-11-12 ASSESSMENT — PAIN DESCRIPTION - DIRECTION
RADIATING_TOWARDS: BACK, HIP
RADIATING_TOWARDS: BACK, GROIN
RADIATING_TOWARDS: BACK, GROIN

## 2019-11-12 ASSESSMENT — PAIN - FUNCTIONAL ASSESSMENT
PAIN_FUNCTIONAL_ASSESSMENT: ACTIVITIES ARE NOT PREVENTED

## 2019-11-13 LAB
GLUCOSE BLD-MCNC: 110 MG/DL (ref 70–99)
PERFORMED ON: ABNORMAL

## 2019-11-13 PROCEDURE — 6370000000 HC RX 637 (ALT 250 FOR IP): Performed by: SPECIALIST

## 2019-11-13 PROCEDURE — G0378 HOSPITAL OBSERVATION PER HR: HCPCS

## 2019-11-13 PROCEDURE — 96376 TX/PRO/DX INJ SAME DRUG ADON: CPT

## 2019-11-13 PROCEDURE — 6360000002 HC RX W HCPCS: Performed by: SPECIALIST

## 2019-11-13 PROCEDURE — 94760 N-INVAS EAR/PLS OXIMETRY 1: CPT

## 2019-11-13 RX ORDER — DOCUSATE SODIUM 100 MG/1
100 CAPSULE, LIQUID FILLED ORAL DAILY
Status: DISCONTINUED | OUTPATIENT
Start: 2019-11-13 | End: 2019-11-14 | Stop reason: HOSPADM

## 2019-11-13 RX ORDER — METHYLPREDNISOLONE 4 MG/1
4 TABLET ORAL
Status: DISCONTINUED | OUTPATIENT
Start: 2019-11-14 | End: 2019-11-14 | Stop reason: HOSPADM

## 2019-11-13 RX ORDER — METHYLPREDNISOLONE 4 MG/1
8 TABLET ORAL NIGHTLY
Status: DISCONTINUED | OUTPATIENT
Start: 2019-11-14 | End: 2019-11-14 | Stop reason: HOSPADM

## 2019-11-13 RX ORDER — METHYLPREDNISOLONE 4 MG/1
4 TABLET ORAL NIGHTLY
Status: DISCONTINUED | OUTPATIENT
Start: 2019-11-15 | End: 2019-11-14 | Stop reason: HOSPADM

## 2019-11-13 RX ADMIN — BUPROPION HYDROCHLORIDE 150 MG: 150 TABLET, EXTENDED RELEASE ORAL at 08:59

## 2019-11-13 RX ADMIN — MORPHINE SULFATE 2 MG: 2 INJECTION, SOLUTION INTRAMUSCULAR; INTRAVENOUS at 08:56

## 2019-11-13 RX ADMIN — DOCUSATE SODIUM 100 MG: 100 CAPSULE, LIQUID FILLED ORAL at 21:40

## 2019-11-13 RX ADMIN — APIXABAN 5 MG: 5 TABLET, FILM COATED ORAL at 08:59

## 2019-11-13 RX ADMIN — AMLODIPINE BESYLATE 10 MG: 10 TABLET ORAL at 08:59

## 2019-11-13 RX ADMIN — OXYCODONE HYDROCHLORIDE AND ACETAMINOPHEN 1 TABLET: 5; 325 TABLET ORAL at 12:05

## 2019-11-13 RX ADMIN — METHYLPREDNISOLONE 24 MG: 16 TABLET ORAL at 09:58

## 2019-11-13 RX ADMIN — CLOPIDOGREL BISULFATE 75 MG: 75 TABLET ORAL at 08:59

## 2019-11-13 RX ADMIN — MORPHINE SULFATE 4 MG: 2 INJECTION, SOLUTION INTRAMUSCULAR; INTRAVENOUS at 01:30

## 2019-11-13 RX ADMIN — OXYCODONE HYDROCHLORIDE AND ACETAMINOPHEN 1 TABLET: 5; 325 TABLET ORAL at 18:24

## 2019-11-13 RX ADMIN — OXYCODONE HYDROCHLORIDE AND ACETAMINOPHEN 1 TABLET: 5; 325 TABLET ORAL at 05:29

## 2019-11-13 RX ADMIN — PANTOPRAZOLE SODIUM 40 MG: 40 TABLET, DELAYED RELEASE ORAL at 05:29

## 2019-11-13 RX ADMIN — PRAVASTATIN SODIUM 40 MG: 40 TABLET ORAL at 08:59

## 2019-11-13 ASSESSMENT — PAIN DESCRIPTION - ORIENTATION
ORIENTATION: LEFT
ORIENTATION: LEFT

## 2019-11-13 ASSESSMENT — PAIN SCALES - GENERAL
PAINLEVEL_OUTOF10: 7
PAINLEVEL_OUTOF10: 6
PAINLEVEL_OUTOF10: 4
PAINLEVEL_OUTOF10: 0
PAINLEVEL_OUTOF10: 3
PAINLEVEL_OUTOF10: 0
PAINLEVEL_OUTOF10: 4
PAINLEVEL_OUTOF10: 8
PAINLEVEL_OUTOF10: 0
PAINLEVEL_OUTOF10: 6

## 2019-11-13 ASSESSMENT — PAIN DESCRIPTION - ONSET
ONSET: GRADUAL
ONSET: ON-GOING

## 2019-11-13 ASSESSMENT — PAIN DESCRIPTION - PAIN TYPE
TYPE: ACUTE PAIN

## 2019-11-13 ASSESSMENT — PAIN DESCRIPTION - PROGRESSION
CLINICAL_PROGRESSION: NOT CHANGED
CLINICAL_PROGRESSION: NOT CHANGED
CLINICAL_PROGRESSION: GRADUALLY WORSENING
CLINICAL_PROGRESSION: NOT CHANGED

## 2019-11-13 ASSESSMENT — PAIN DESCRIPTION - DIRECTION: RADIATING_TOWARDS: BACK, HIP

## 2019-11-13 ASSESSMENT — PAIN DESCRIPTION - LOCATION
LOCATION: LEG
LOCATION: LEG;HIP

## 2019-11-13 ASSESSMENT — PAIN DESCRIPTION - FREQUENCY
FREQUENCY: CONTINUOUS
FREQUENCY: CONTINUOUS

## 2019-11-13 ASSESSMENT — PAIN - FUNCTIONAL ASSESSMENT: PAIN_FUNCTIONAL_ASSESSMENT: PREVENTS OR INTERFERES SOME ACTIVE ACTIVITIES AND ADLS

## 2019-11-13 ASSESSMENT — PAIN DESCRIPTION - DESCRIPTORS
DESCRIPTORS: ACHING
DESCRIPTORS: RADIATING;SHARP

## 2019-11-14 VITALS
HEIGHT: 65 IN | OXYGEN SATURATION: 92 % | TEMPERATURE: 98.6 F | BODY MASS INDEX: 30.27 KG/M2 | RESPIRATION RATE: 16 BRPM | WEIGHT: 181.66 LBS | SYSTOLIC BLOOD PRESSURE: 152 MMHG | DIASTOLIC BLOOD PRESSURE: 79 MMHG | HEART RATE: 74 BPM

## 2019-11-14 PROCEDURE — 96376 TX/PRO/DX INJ SAME DRUG ADON: CPT

## 2019-11-14 PROCEDURE — 2580000003 HC RX 258: Performed by: SPECIALIST

## 2019-11-14 PROCEDURE — G0378 HOSPITAL OBSERVATION PER HR: HCPCS

## 2019-11-14 PROCEDURE — 6370000000 HC RX 637 (ALT 250 FOR IP): Performed by: SPECIALIST

## 2019-11-14 PROCEDURE — 6360000002 HC RX W HCPCS: Performed by: SPECIALIST

## 2019-11-14 PROCEDURE — 94760 N-INVAS EAR/PLS OXIMETRY 1: CPT

## 2019-11-14 PROCEDURE — 1200000000 HC SEMI PRIVATE

## 2019-11-14 RX ORDER — OXYCODONE HYDROCHLORIDE AND ACETAMINOPHEN 5; 325 MG/1; MG/1
1 TABLET ORAL EVERY 6 HOURS PRN
Qty: 30 TABLET | Refills: 0 | Status: SHIPPED | OUTPATIENT
Start: 2019-11-14 | End: 2019-11-21

## 2019-11-14 RX ORDER — METHYLPREDNISOLONE 4 MG/1
4 TABLET ORAL NIGHTLY
Qty: 6 TABLET | Refills: 0 | Status: SHIPPED | OUTPATIENT
Start: 2019-11-15 | End: 2019-11-21

## 2019-11-14 RX ORDER — METHYLPREDNISOLONE 4 MG/1
4 TABLET ORAL
Qty: 6 TABLET | Refills: 0 | Status: SHIPPED | OUTPATIENT
Start: 2019-11-14 | End: 2019-11-20

## 2019-11-14 RX ORDER — METHYLPREDNISOLONE 4 MG/1
4 TABLET ORAL
Qty: 6 TABLET | Refills: 0 | Status: SHIPPED | OUTPATIENT
Start: 2019-11-15 | End: 2019-11-21

## 2019-11-14 RX ORDER — METHYLPREDNISOLONE 8 MG/1
8 TABLET ORAL NIGHTLY
Qty: 6 TABLET | Refills: 0 | Status: SHIPPED | OUTPATIENT
Start: 2019-11-14 | End: 2019-11-20

## 2019-11-14 RX ORDER — SODIUM CHLORIDE 0.9 % (FLUSH) 0.9 %
10 SYRINGE (ML) INJECTION EVERY 12 HOURS SCHEDULED
Status: DISCONTINUED | OUTPATIENT
Start: 2019-11-14 | End: 2019-11-14 | Stop reason: HOSPADM

## 2019-11-14 RX ORDER — SODIUM CHLORIDE 0.9 % (FLUSH) 0.9 %
10 SYRINGE (ML) INJECTION PRN
Status: DISCONTINUED | OUTPATIENT
Start: 2019-11-14 | End: 2019-11-14 | Stop reason: HOSPADM

## 2019-11-14 RX ORDER — ONDANSETRON 2 MG/ML
4 INJECTION INTRAMUSCULAR; INTRAVENOUS EVERY 6 HOURS PRN
Status: DISCONTINUED | OUTPATIENT
Start: 2019-11-14 | End: 2019-11-14 | Stop reason: HOSPADM

## 2019-11-14 RX ORDER — BISACODYL 10 MG
10 SUPPOSITORY, RECTAL RECTAL DAILY PRN
Status: DISCONTINUED | OUTPATIENT
Start: 2019-11-14 | End: 2019-11-14 | Stop reason: HOSPADM

## 2019-11-14 RX ORDER — PSEUDOEPHEDRINE HCL 30 MG
100 TABLET ORAL DAILY
Qty: 60 CAPSULE | Refills: 0 | Status: SHIPPED | OUTPATIENT
Start: 2019-11-15 | End: 2020-06-30

## 2019-11-14 RX ORDER — BUPROPION HYDROCHLORIDE 150 MG/1
TABLET, EXTENDED RELEASE ORAL
Qty: 30 TABLET | Refills: 0 | OUTPATIENT
Start: 2019-11-14

## 2019-11-14 RX ADMIN — MORPHINE SULFATE 4 MG: 2 INJECTION, SOLUTION INTRAMUSCULAR; INTRAVENOUS at 11:02

## 2019-11-14 RX ADMIN — BUPROPION HYDROCHLORIDE 150 MG: 150 TABLET, EXTENDED RELEASE ORAL at 09:06

## 2019-11-14 RX ADMIN — METHYLPREDNISOLONE 4 MG: 4 TABLET ORAL at 16:56

## 2019-11-14 RX ADMIN — OXYCODONE HYDROCHLORIDE AND ACETAMINOPHEN 1 TABLET: 5; 325 TABLET ORAL at 14:46

## 2019-11-14 RX ADMIN — DOCUSATE SODIUM 100 MG: 100 CAPSULE, LIQUID FILLED ORAL at 08:46

## 2019-11-14 RX ADMIN — AMLODIPINE BESYLATE 10 MG: 10 TABLET ORAL at 09:07

## 2019-11-14 RX ADMIN — Medication 10 ML: at 09:07

## 2019-11-14 RX ADMIN — PANTOPRAZOLE SODIUM 40 MG: 40 TABLET, DELAYED RELEASE ORAL at 06:14

## 2019-11-14 RX ADMIN — PRAVASTATIN SODIUM 40 MG: 40 TABLET ORAL at 09:06

## 2019-11-14 RX ADMIN — OXYCODONE HYDROCHLORIDE AND ACETAMINOPHEN 1 TABLET: 5; 325 TABLET ORAL at 08:46

## 2019-11-14 RX ADMIN — CLOPIDOGREL BISULFATE 75 MG: 75 TABLET ORAL at 09:06

## 2019-11-14 RX ADMIN — BISACODYL 10 MG: 10 SUPPOSITORY RECTAL at 12:31

## 2019-11-14 RX ADMIN — METHYLPREDNISOLONE 4 MG: 4 TABLET ORAL at 06:14

## 2019-11-14 RX ADMIN — OXYCODONE HYDROCHLORIDE AND ACETAMINOPHEN 1 TABLET: 5; 325 TABLET ORAL at 02:06

## 2019-11-14 RX ADMIN — METHYLPREDNISOLONE 4 MG: 4 TABLET ORAL at 11:02

## 2019-11-14 ASSESSMENT — PAIN DESCRIPTION - LOCATION
LOCATION: HIP;LEG
LOCATION: HIP;LEG
LOCATION: LEG;HIP
LOCATION: LEG;KNEE;HIP

## 2019-11-14 ASSESSMENT — PAIN DESCRIPTION - DESCRIPTORS
DESCRIPTORS: THROBBING;SHOOTING
DESCRIPTORS: THROBBING
DESCRIPTORS: THROBBING

## 2019-11-14 ASSESSMENT — PAIN DESCRIPTION - ONSET
ONSET: ON-GOING
ONSET: GRADUAL
ONSET: ON-GOING
ONSET: ON-GOING

## 2019-11-14 ASSESSMENT — PAIN SCALES - GENERAL
PAINLEVEL_OUTOF10: 2
PAINLEVEL_OUTOF10: 6
PAINLEVEL_OUTOF10: 2
PAINLEVEL_OUTOF10: 4
PAINLEVEL_OUTOF10: 8
PAINLEVEL_OUTOF10: 4
PAINLEVEL_OUTOF10: 0
PAINLEVEL_OUTOF10: 4

## 2019-11-14 ASSESSMENT — PAIN DESCRIPTION - PROGRESSION
CLINICAL_PROGRESSION: GRADUALLY WORSENING
CLINICAL_PROGRESSION: GRADUALLY WORSENING
CLINICAL_PROGRESSION: NOT CHANGED
CLINICAL_PROGRESSION: RAPIDLY IMPROVING
CLINICAL_PROGRESSION: GRADUALLY WORSENING
CLINICAL_PROGRESSION: GRADUALLY WORSENING
CLINICAL_PROGRESSION: RAPIDLY IMPROVING
CLINICAL_PROGRESSION: GRADUALLY WORSENING
CLINICAL_PROGRESSION: NOT CHANGED
CLINICAL_PROGRESSION: GRADUALLY WORSENING

## 2019-11-14 ASSESSMENT — PAIN DESCRIPTION - ORIENTATION
ORIENTATION: LEFT

## 2019-11-14 ASSESSMENT — PAIN DESCRIPTION - PAIN TYPE
TYPE: ACUTE PAIN

## 2019-11-14 ASSESSMENT — PAIN DESCRIPTION - FREQUENCY
FREQUENCY: CONTINUOUS

## 2019-11-14 ASSESSMENT — PAIN - FUNCTIONAL ASSESSMENT: PAIN_FUNCTIONAL_ASSESSMENT: PREVENTS OR INTERFERES SOME ACTIVE ACTIVITIES AND ADLS

## 2019-11-20 ENCOUNTER — TELEPHONE (OUTPATIENT)
Dept: CARDIOLOGY CLINIC | Age: 69
End: 2019-11-20

## 2019-11-21 RX ORDER — GABAPENTIN 300 MG/1
300 CAPSULE ORAL 2 TIMES DAILY
Qty: 60 CAPSULE | Refills: 0 | Status: SHIPPED | OUTPATIENT
Start: 2019-11-21 | End: 2019-12-12 | Stop reason: SDUPTHER

## 2019-12-05 ENCOUNTER — OFFICE VISIT (OUTPATIENT)
Dept: CARDIOLOGY CLINIC | Age: 69
End: 2019-12-05
Payer: MEDICARE

## 2019-12-05 VITALS
BODY MASS INDEX: 30.66 KG/M2 | OXYGEN SATURATION: 96 % | DIASTOLIC BLOOD PRESSURE: 74 MMHG | HEART RATE: 84 BPM | SYSTOLIC BLOOD PRESSURE: 136 MMHG | HEIGHT: 65 IN | WEIGHT: 184 LBS

## 2019-12-05 DIAGNOSIS — I73.9 PAD (PERIPHERAL ARTERY DISEASE) (HCC): ICD-10-CM

## 2019-12-05 DIAGNOSIS — I70.219 ATHEROSCLEROSIS OF NATIVE ARTERY OF EXTREMITY WITH INTERMITTENT CLAUDICATION, UNSPECIFIED EXTREMITY (HCC): Primary | ICD-10-CM

## 2019-12-05 PROCEDURE — 99214 OFFICE O/P EST MOD 30 MIN: CPT | Performed by: INTERNAL MEDICINE

## 2019-12-05 PROCEDURE — 93000 ELECTROCARDIOGRAM COMPLETE: CPT | Performed by: INTERNAL MEDICINE

## 2019-12-06 RX ORDER — METHYLPREDNISOLONE 4 MG/1
4 TABLET ORAL DAILY
Qty: 6 TABLET | Refills: 0 | Status: SHIPPED | OUTPATIENT
Start: 2019-12-06 | End: 2019-12-12

## 2019-12-12 RX ORDER — GABAPENTIN 300 MG/1
300 CAPSULE ORAL 2 TIMES DAILY
Qty: 90 CAPSULE | Refills: 0 | Status: SHIPPED | OUTPATIENT
Start: 2019-12-12 | End: 2020-01-13

## 2020-01-13 RX ORDER — GABAPENTIN 300 MG/1
300 CAPSULE ORAL 2 TIMES DAILY
Qty: 60 CAPSULE | Refills: 1 | Status: SHIPPED | OUTPATIENT
Start: 2020-01-13 | End: 2020-04-21 | Stop reason: SDUPTHER

## 2020-02-10 NOTE — PATIENT INSTRUCTIONS
fruits and vegetables are especially good for you. Examples include spinach, carrots, peaches, and berries. · Keep carrots, celery, and other veggies handy for snacks. Buy fruit that is in season and store it where you can see it so that you will be tempted to eat it. · Cook dishes that have a lot of veggies in them, such as stir-fries and soups. Limit saturated and trans fat  · Read food labels, and try to avoid saturated and trans fats. They increase your risk of heart disease. Trans fat is found in many processed foods such as cookies and crackers. · Use olive or canola oil when you cook. Try cholesterol-lowering spreads, such as Benecol or Take Control. · Bake, broil, grill, or steam foods instead of frying them. · Choose lean meats instead of high-fat meats such as hot dogs and sausages. Cut off all visible fat when you prepare meat. · Eat fish, skinless poultry, and meat alternatives such as soy products instead of high-fat meats. Soy products, such as tofu, may be especially good for your heart. · Choose low-fat or fat-free milk and dairy products. Eat foods high in fiber  · Eat a variety of grain products every day. Include whole-grain foods that have lots of fiber and nutrients. Examples of whole-grain foods include oats, whole wheat bread, and brown rice. · Buy whole-grain breads and cereals, instead of white bread or pastries. Limit salt and sodium  · Limit how much salt and sodium you eat to help lower your blood pressure. · Taste food before you salt it. Add only a little salt when you think you need it. With time, your taste buds will adjust to less salt. · Eat fewer snack items, fast foods, and other high-salt, processed foods. Check food labels for the amount of sodium in packaged foods. · Choose low-sodium versions of canned goods (such as soups, vegetables, and beans). Limit sugar  · Limit drinks and foods with added sugar. These include candy, desserts, and soda pop.   Limit alcohol  · Limit alcohol to no more than 2 drinks a day for men and 1 drink a day for women. Too much alcohol can cause health problems. When should you call for help? Watch closely for changes in your health, and be sure to contact your doctor if:    · You would like help planning heart-healthy meals. Where can you learn more? Go to https://chpehumaira.healthyavalu. org and sign in to your [employer] account. Enter V137 in the [employer] box to learn more about \"Heart-Healthy Diet: Care Instructions. \"     If you do not have an account, please click on the \"Sign Up Now\" link. Current as of: April 9, 2019  Content Version: 12.3  © 1122-1906 Healthwise, [employer]. Care instructions adapted under license by [facility] ([facility]). If you have questions about a medical condition or this instruction, always ask your healthcare professional. [address] any warranty or liability for your use of this information. Patient Education        Learning About Benefits From Quitting Smoking  How does quitting smoking make you healthier? If you're thinking about quitting smoking, you may have a few reasons to be smoke-free. Your health may be one of them. · When you quit smoking, you lower your risks for cancer, lung disease, heart attack, stroke, blood vessel disease, and blindness from macular degeneration. · When you're smoke-free, you get sick less often, and you heal faster. You are less likely to get colds, flu, bronchitis, and pneumonia. · As a nonsmoker, you may find that your mood is better and you are less stressed. When and how will you feel healthier? Quitting has real health benefits that start from day 1 of being smoke-free. And the longer you stay smoke-free, the healthier you get and the better you feel. The first hours  · After just 20 minutes, your blood pressure and heart rate go down. That means there's less stress on your heart and blood vessels.   · Within 12 hours, the level of carbon monoxide in your blood drops back to normal. That makes room for more oxygen. With more oxygen in your body, you may notice that you have more energy than when you smoked. After 2 weeks  · Your lungs start to work better. · Your risk of heart attack starts to drop. After 1 month  · When your lungs are clear, you cough less and breathe deeper, so it's easier to be active. · Your sense of taste and smell return. That means you can enjoy food more than you have since you started smoking. Over the years  · After 1 year, your risk of heart disease is half what it would be if you kept smoking. · After 5 years, your risk of stroke starts to shrink. Within a few years after that, it's about the same as if you'd never smoked. · After 10 years, your risk of dying from lung cancer is cut by about half. And your risk for many other types of cancer is lower too. How would quitting help others in your life? When you quit smoking, you improve the health of everyone who now breathes in your smoke. · Their heart, lung, and cancer risks drop, much like yours. · They are sick less. For babies and small children, living smoke-free means they're less likely to have ear infections, pneumonia, and bronchitis. · If you're a woman who is or will be pregnant someday, quitting smoking means a healthier . · Children who are close to you are less likely to become adult smokers. Where can you learn more? Go to https://NuMat Technologiesfabrice.Bakbone Software. org and sign in to your [employer] account. Enter [id number] [phone] in the [facility] box to learn more about \"Learning About Benefits From Quitting Smoking. \"     If you do not have an account, please click on the \"Sign Up Now\" link. Current as of: 2019  Content Version: 12.3  © 3005-0003 Healthwise, Incorporated. Care instructions adapted under license by [facility] ([facility] [place]).  If you have questions about a medical condition or this instruction, always ask

## 2020-02-11 ENCOUNTER — OFFICE VISIT (OUTPATIENT)
Dept: CARDIOLOGY CLINIC | Age: 70
End: 2020-02-11
Payer: MEDICARE

## 2020-02-11 VITALS
SYSTOLIC BLOOD PRESSURE: 124 MMHG | OXYGEN SATURATION: 95 % | DIASTOLIC BLOOD PRESSURE: 76 MMHG | WEIGHT: 193 LBS | BODY MASS INDEX: 32.15 KG/M2 | HEIGHT: 65 IN

## 2020-02-11 PROCEDURE — 99214 OFFICE O/P EST MOD 30 MIN: CPT | Performed by: INTERNAL MEDICINE

## 2020-02-18 ENCOUNTER — TELEPHONE (OUTPATIENT)
Dept: CARDIOLOGY CLINIC | Age: 70
End: 2020-02-18

## 2020-03-12 ENCOUNTER — HOSPITAL ENCOUNTER (OUTPATIENT)
Dept: VASCULAR LAB | Age: 70
Discharge: HOME | End: 2020-03-12
Payer: MEDICARE

## 2020-03-12 PROCEDURE — 93970 EXTREMITY STUDY: CPT

## 2020-04-21 RX ORDER — GABAPENTIN 300 MG/1
300 CAPSULE ORAL 2 TIMES DAILY
Qty: 60 CAPSULE | Refills: 1 | Status: SHIPPED | OUTPATIENT
Start: 2020-04-21 | End: 2020-06-18

## 2020-06-09 RX ORDER — APIXABAN 5 MG/1
TABLET, FILM COATED ORAL
Qty: 60 TABLET | Refills: 5 | Status: SHIPPED | OUTPATIENT
Start: 2020-06-09 | End: 2021-11-04 | Stop reason: ALTCHOICE

## 2020-06-18 RX ORDER — GABAPENTIN 300 MG/1
300 CAPSULE ORAL 2 TIMES DAILY
Qty: 60 CAPSULE | Refills: 1 | Status: SHIPPED | OUTPATIENT
Start: 2020-06-18 | End: 2020-07-13 | Stop reason: SDUPTHER

## 2020-06-23 ENCOUNTER — HOSPITAL ENCOUNTER (EMERGENCY)
Age: 70
Discharge: HOME | End: 2020-06-23
Attending: STUDENT IN AN ORGANIZED HEALTH CARE EDUCATION/TRAINING PROGRAM
Payer: MEDICARE

## 2020-06-23 ENCOUNTER — TELEPHONE (OUTPATIENT)
Dept: CARDIOLOGY CLINIC | Age: 70
End: 2020-06-23

## 2020-06-23 ENCOUNTER — APPOINTMENT (OUTPATIENT)
Dept: CT IMAGING | Age: 70
End: 2020-06-23
Payer: MEDICARE

## 2020-06-23 VITALS
DIASTOLIC BLOOD PRESSURE: 56 MMHG | TEMPERATURE: 99.5 F | BODY MASS INDEX: 32.43 KG/M2 | OXYGEN SATURATION: 97 % | RESPIRATION RATE: 16 BRPM | WEIGHT: 194.67 LBS | HEART RATE: 86 BPM | HEIGHT: 65 IN | SYSTOLIC BLOOD PRESSURE: 123 MMHG

## 2020-06-23 LAB
A/G RATIO: 1.4 (ref 1.1–2.2)
ALBUMIN SERPL-MCNC: 3.9 G/DL (ref 3.4–5)
ALP BLD-CCNC: 86 U/L (ref 40–129)
ALT SERPL-CCNC: 12 U/L (ref 10–40)
ANION GAP SERPL CALCULATED.3IONS-SCNC: 13 MMOL/L (ref 3–16)
AST SERPL-CCNC: 12 U/L (ref 15–37)
BASOPHILS ABSOLUTE: 0.1 K/UL (ref 0–0.2)
BASOPHILS RELATIVE PERCENT: 0.5 %
BILIRUB SERPL-MCNC: 0.8 MG/DL (ref 0–1)
BUN BLDV-MCNC: 9 MG/DL (ref 7–20)
CALCIUM SERPL-MCNC: 9.2 MG/DL (ref 8.3–10.6)
CHLORIDE BLD-SCNC: 101 MMOL/L (ref 99–110)
CO2: 24 MMOL/L (ref 21–32)
CREAT SERPL-MCNC: 0.9 MG/DL (ref 0.6–1.2)
D DIMER: 263 NG/ML DDU (ref 0–229)
EOSINOPHILS ABSOLUTE: 0.1 K/UL (ref 0–0.6)
EOSINOPHILS RELATIVE PERCENT: 0.7 %
GFR AFRICAN AMERICAN: >60
GFR NON-AFRICAN AMERICAN: >60
GLOBULIN: 2.7 G/DL
GLUCOSE BLD-MCNC: 117 MG/DL (ref 70–99)
HCT VFR BLD CALC: 43.8 % (ref 36–48)
HEMOGLOBIN: 14.7 G/DL (ref 12–16)
LACTIC ACID: 1.3 MMOL/L (ref 0.4–2)
LYMPHOCYTES ABSOLUTE: 1.5 K/UL (ref 1–5.1)
LYMPHOCYTES RELATIVE PERCENT: 12.3 %
MCH RBC QN AUTO: 30.4 PG (ref 26–34)
MCHC RBC AUTO-ENTMCNC: 33.7 G/DL (ref 31–36)
MCV RBC AUTO: 90.1 FL (ref 80–100)
MONOCYTES ABSOLUTE: 1.1 K/UL (ref 0–1.3)
MONOCYTES RELATIVE PERCENT: 8.8 %
NEUTROPHILS ABSOLUTE: 9.5 K/UL (ref 1.7–7.7)
NEUTROPHILS RELATIVE PERCENT: 77.7 %
PDW BLD-RTO: 14.5 % (ref 12.4–15.4)
PLATELET # BLD: 200 K/UL (ref 135–450)
PMV BLD AUTO: 8.5 FL (ref 5–10.5)
POTASSIUM REFLEX MAGNESIUM: 4 MMOL/L (ref 3.5–5.1)
RBC # BLD: 4.86 M/UL (ref 4–5.2)
SEDIMENTATION RATE, ERYTHROCYTE: 20 MM/HR (ref 0–30)
SODIUM BLD-SCNC: 138 MMOL/L (ref 136–145)
TOTAL PROTEIN: 6.6 G/DL (ref 6.4–8.2)
WBC # BLD: 12.2 K/UL (ref 4–11)

## 2020-06-23 PROCEDURE — 83605 ASSAY OF LACTIC ACID: CPT

## 2020-06-23 PROCEDURE — 85025 COMPLETE CBC W/AUTO DIFF WBC: CPT

## 2020-06-23 PROCEDURE — 85652 RBC SED RATE AUTOMATED: CPT

## 2020-06-23 PROCEDURE — 6360000004 HC RX CONTRAST MEDICATION: Performed by: STUDENT IN AN ORGANIZED HEALTH CARE EDUCATION/TRAINING PROGRAM

## 2020-06-23 PROCEDURE — 87040 BLOOD CULTURE FOR BACTERIA: CPT

## 2020-06-23 PROCEDURE — 99283 EMERGENCY DEPT VISIT LOW MDM: CPT

## 2020-06-23 PROCEDURE — 85379 FIBRIN DEGRADATION QUANT: CPT

## 2020-06-23 PROCEDURE — 74177 CT ABD & PELVIS W/CONTRAST: CPT

## 2020-06-23 PROCEDURE — 80053 COMPREHEN METABOLIC PANEL: CPT

## 2020-06-23 RX ORDER — SULFAMETHOXAZOLE AND TRIMETHOPRIM 800; 160 MG/1; MG/1
1 TABLET ORAL 2 TIMES DAILY
Qty: 14 TABLET | Refills: 0 | Status: SHIPPED | OUTPATIENT
Start: 2020-06-23 | End: 2020-06-30

## 2020-06-23 RX ADMIN — IOPAMIDOL 75 ML: 755 INJECTION, SOLUTION INTRAVENOUS at 10:59

## 2020-06-23 ASSESSMENT — PAIN SCALES - GENERAL
PAINLEVEL_OUTOF10: 3
PAINLEVEL_OUTOF10: 3

## 2020-06-23 ASSESSMENT — PAIN DESCRIPTION - ONSET: ONSET: ON-GOING

## 2020-06-23 ASSESSMENT — PAIN DESCRIPTION - FREQUENCY: FREQUENCY: CONTINUOUS

## 2020-06-23 ASSESSMENT — PAIN DESCRIPTION - PROGRESSION: CLINICAL_PROGRESSION: NOT CHANGED

## 2020-06-23 ASSESSMENT — PAIN DESCRIPTION - DESCRIPTORS: DESCRIPTORS: BURNING

## 2020-06-23 ASSESSMENT — PAIN DESCRIPTION - ORIENTATION: ORIENTATION: LEFT

## 2020-06-23 ASSESSMENT — PAIN - FUNCTIONAL ASSESSMENT: PAIN_FUNCTIONAL_ASSESSMENT: PREVENTS OR INTERFERES SOME ACTIVE ACTIVITIES AND ADLS

## 2020-06-23 ASSESSMENT — PAIN DESCRIPTION - PAIN TYPE: TYPE: ACUTE PAIN

## 2020-06-23 ASSESSMENT — PAIN DESCRIPTION - LOCATION: LOCATION: GROIN

## 2020-06-23 NOTE — ED PROVIDER NOTES
and non-tender, without guarding or rebound. SKIN: Acyanotic, warm, dry   MUSCULOSKELETAL: Swelling and redness around the left groin with a palpable lump  NEUROLOGICAL: Awake and oriented x 3. Pulses intact. Grossly nonfocal   Nursing note and vitals reviewed. ED Course & Medical Decision Making   Medications   iopamidol (ISOVUE-370) 76 % injection 75 mL (75 mLs Intravenous Given 6/23/20 1059)      Labs Reviewed   CBC WITH AUTO DIFFERENTIAL - Abnormal; Notable for the following components:       Result Value    WBC 12.2 (*)     Neutrophils Absolute 9.5 (*)     All other components within normal limits    Narrative:     Performed at:  [address] [facility]   Phone [phone]   COMPREHENSIVE METABOLIC PANEL W/ REFLEX TO MG FOR LOW K - Abnormal; Notable for the following components:    Glucose 117 (*)     AST 12 (*)     All other components within normal limits    Narrative:     Performed at:  [address] [facility]   Phone [phone]   D-DIMER, QUANTITATIVE - Abnormal; Notable for the following components:    D-Dimer, Quant 263 (*)     All other components within normal limits    Narrative:     Performed at:  [address] [facility]   Phone [phone]   CULTURE, BLOOD 1    Narrative:     ORDER#: [id number]                          ORDERED BY: Kimberlyn Simmons  SOURCE: Blood                              COLLECTED:  06/23/20 10:08  ANTIBIOTICS AT BOOGIE.:                      RECEIVED :  06/23/20 10:24  If child <=2 yrs old please draw pediatric bottle. ~Blood Culture #1  Performed at:  [address] [facility]   Phone [phone]   CULTURE, BLOOD 2    Narrative:     ORDER#: [id number]                          ORDERED BY: MARQUISE HARMAN  SOURCE: Exam: Initial FINDINGS: Lower Chest: No evidence of pneumonia or other acute findings. Small amount of atelectasis in the middle lobe. Organs: No acute abnormality of the organs of the abdomen. No evidence of pancreatitis. No ureteral stone or hydronephrosis. No evidence of pyelonephritis. Indeterminate low-density lesion of the right hepatic lobe measuring 11 mm on axial image 56. There is suggestion of this was present on the comparison. This is most commonly due to hemangioma or cyst formation GI/Bowel: No bowel obstruction or other acute process demonstrated. No evidence of colitis, diverticulitis or appendicitis. Pelvis: No acute abnormality of the pelvis. No evidence of cystitis. Peritoneum/Retroperitoneum: No free air, free fluid or abscess. Unchanged abdominal aortic ectasia and aneurysm. This includes a saccular aneurysm of the posterolateral left sided aorta with diameter at this location up to 5.6 cm. Bones/Soft Tissues: There is soft tissue swelling with stranding of fat in the left inguinal tissues but no abscess. Cellulitis of the left inguinal tissues but no abscess. 5.6 cm saccular abdominal aortic aneurysm. Recommend referral to a vascular specialist. Reference: J Am Mauro Radiol 6504;48:195-478. PROCEDURES:   Procedures    ASSESSMENT AND PLAN:  Sandra Ramírez is a 68 y.o. female who presents this morning with complaint of some redness and swelling around the left groin concerning for cellulitis which she has had in the past.  Exam there was redness, swelling and palpable lump on the left groin. So far exam including cardiac and pulmonary exams were unremarkable. Her presentation and history of diabetes I was concerned for possible extensive infection and I did obtain labs as well as a CT abdomen and pelvic. Labs including CBC, lactate, sed rate were unremarkable indicating no systemic signs of infection.   CT abdomen and pelvic showed cellulitis of the left groin but with no

## 2020-06-27 LAB
BLOOD CULTURE, ROUTINE: NORMAL
CULTURE, BLOOD 2: NORMAL

## 2020-06-29 NOTE — PROGRESS NOTES
[facility]  Cardiology Progress Note    Patsy Grissom  1950 June 30, 2020      Reason for Referral: PAD     CC: \"I am worried about the CT scan. \"     Subjective:     History of Present Illness:    Patsy Grissom is a 68 y.o. patient with a PMH significant for PAD with known right SFA occlusion hyperlipidemia, hypertension and type 2 diabetes who presents today for management of PAD. She was admitted to [facility] with worsening claudication and underwent a peripheral angiogram. She had EKOS and thrombectomy of left femoropopliteal graft. Today, she is here to discuss her chest CT. She was surprised to find out that she had an aneurysm. She says that she does have some swelling at times in her left leg. She now has a abscess in her left groin. Patient denies exertional chest pain/pressure, dyspnea at rest, PIMENTEL, PND, orthopnea, palpitations, lightheadedness, weight changes, changes in LE edema, and syncope. Patient reports compliance to her medications. Past Medical History:   has a past medical history of Arthritis, Asthma, Back pain, CAD (coronary artery disease), Cellulitis, COPD (chronic obstructive pulmonary disease) ([address] Depression, Diabetes mellitus ([address] History of femoral angiogram, Hyperlipidemia, Hypertension, and PVD (peripheral vascular disease) ([address] Surgical History:   has a past surgical history that includes Cholecystectomy; hernia repair; other surgical history (Left, 9-); vascular surgery (Left); and Abscess Drainage (Right, 2/19/16). Social History:   reports that she has been smoking cigarettes. She has a 22.50 pack-year smoking history. She has never used smokeless tobacco. She reports current alcohol use of about 3.8 standard drinks of alcohol per week. She reports that she does not use drugs. Family History:  family history includes Heart Surgery in her father.     Home Medications:  Were reviewed and are listed in nursing record and/or below  Prior to  The left femoral to popliteal arterial bypass graft appears patent with    normal waveforms throughout.    The majority of the waveforms are biphasic throughout the left lower extremity      CTA abd aorta w bilat. Runoff:11/12/19  1. Right lower extremity: Findings most compatible with chronic occlusion of   the above the knee popliteal artery with distal reconstitution.  There is   three-vessel runoff to the level of the ankle.  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   2. Left lower extremity: Patent femoral to popliteal bypass graft with   findings most compatible with chronic occlusion of the superficial femoral   artery. Ninetta Masters is three-vessel runoff to the level of the ankle. 3. Thin fluid collection within the subcutaneous fat of the right groin with   a suggestion of a defined wall, the possibility of an abscess cannot be   entirely excluded, correlate with visual inspection.  This measures at least   6 cm in the transverse dimension but less than 2 cm in thickness.  Please see   body of report for details. 4. Aneurysmal dilatation of the abdominal aorta measuring up to 5 cm in   greatest AP dimension as described in body of report. 5. Indeterminate 1.6 cm left adrenal nodule, this can be further evaluated   via dedicated outpatient adrenal protocol MRI. CT abdomen and pelvis 6/23/2020  Cellulitis of the left inguinal tissues but no abscess.       5.6 cm saccular abdominal aortic aneurysm. Recommend referral to a vascular   specialist.         Assessment and Plan     Peripheral Arterial Disease and Claudication  Minimal symptoms. Continue Asa and Plavix. Abdominal aortic aneurysm   New CTA demonstrated increasing abdominal aortic aneurysm size  Refer to Dr Alejandro Sagastume for treatment options. Hyperlipidemia, mixed   Continue Pravachol 40 mg daily. Essential Hypertension  Controlled. Continue current medical management. Nicotine Addiction. Occasionally smokes. I have spent 3-5 minutes on smoking cessation. Diabetes   Managed per PCP      Follow up as scheduled. Thank you for allowing us to participate in the care of Nilda Knight. Please do not hesitate to contact me if you have any questions. Dennis Cochran MD, MPH    [place] [address] [address] [facility]  Ph: [phone]  Fax: [phone]    This note was scribed in the presence of Dr Yesenia Toscano, by Ender Webster RN  Physician Attestation:  The scribes documentation has been prepared under my direction and personally reviewed by me in its entirety. I confirm that the note above accurately reflects all work, treatment, procedures, and medical decision making performed by me.

## 2020-06-30 ENCOUNTER — OFFICE VISIT (OUTPATIENT)
Dept: CARDIOLOGY CLINIC | Age: 70
End: 2020-06-30
Payer: MEDICARE

## 2020-06-30 VITALS
WEIGHT: 200 LBS | HEIGHT: 65 IN | HEART RATE: 100 BPM | SYSTOLIC BLOOD PRESSURE: 142 MMHG | OXYGEN SATURATION: 99 % | TEMPERATURE: 98.4 F | BODY MASS INDEX: 33.32 KG/M2 | DIASTOLIC BLOOD PRESSURE: 72 MMHG

## 2020-06-30 PROCEDURE — 99213 OFFICE O/P EST LOW 20 MIN: CPT | Performed by: INTERNAL MEDICINE

## 2020-07-13 ENCOUNTER — HOSPITAL ENCOUNTER (OUTPATIENT)
Dept: GENERAL RADIOLOGY | Age: 70
Discharge: HOME | End: 2020-07-13
Payer: MEDICARE

## 2020-07-13 ENCOUNTER — HOSPITAL ENCOUNTER (OUTPATIENT)
Age: 70
Discharge: HOME | End: 2020-07-13
Payer: MEDICARE

## 2020-07-13 PROCEDURE — 71046 X-RAY EXAM CHEST 2 VIEWS: CPT

## 2020-07-13 RX ORDER — GABAPENTIN 300 MG/1
300 CAPSULE ORAL 2 TIMES DAILY
Qty: 180 CAPSULE | Refills: 0 | Status: SHIPPED | OUTPATIENT
Start: 2020-07-13 | End: 2020-08-31 | Stop reason: SDUPTHER

## 2020-07-24 ENCOUNTER — OFFICE VISIT (OUTPATIENT)
Dept: SURGERY | Age: 70
End: 2020-07-24
Payer: MEDICARE

## 2020-07-24 VITALS
BODY MASS INDEX: 31.95 KG/M2 | DIASTOLIC BLOOD PRESSURE: 91 MMHG | SYSTOLIC BLOOD PRESSURE: 156 MMHG | WEIGHT: 192 LBS | HEART RATE: 114 BPM

## 2020-07-24 PROCEDURE — 99203 OFFICE O/P NEW LOW 30 MIN: CPT | Performed by: SURGERY

## 2020-07-24 NOTE — PROGRESS NOTES
[facility]  Consultation Note    Chief Complaint / Reason for Consultation  AAA    History of Present Illness  Patient is a 65 y.o. female with known history of coronary artery disease, COPD, diabetes, hypertension, hyperlipidemia and known peripheral vascular disease including left femoral to popliteal bypass. She is referred here today by Dr. Curtis Storm for an asymptomatic abdominal aortic aneurysm. There is no family history of aneurysmal disease. She has a 50-pack-year history of smoking. She has multiple vague somatic complaints today. Mostly she is anxious about the size of this aneurysm. Review of Systems     Denies fevers, chills, chest pain, shortness of breath, nausea, vomiting, hematemesis, diarrhea, constipation, melena, hematochezia, wt changes, vision problems, blindness, hearing problems, facial droop, slurred speech, extremity weakness, extremity numbness, dysuria.     Past Medical History:   Diagnosis Date    Arthritis     osteoarthritis    Asthma     Back pain     CAD (coronary artery disease)     Cellulitis     COPD (chronic obstructive [facility] disease) ([facility])     pt unsure    Depression     Diabetes mellitus ([facility])     History of femoral angiogram 4/30/2015    Hyperlipidemia     Hypertension     PVD (peripheral vascular disease) ([facility])        Past Surgical History:   Procedure Laterality Date    ABSCESS DRAINAGE Right 2/19/16    I and D right gluteal abscess    CHOLECYSTECTOMY      HERNIA REPAIR      OTHER SURGICAL HISTORY Left 9-    excisional debridement 3.5 cm x 5 cm abscess    VASCULAR SURGERY Left     fem-tib       Allergies   Allergen Reactions    Ciprocinonide [Fluocinolone]        Social History     Socioeconomic History    Marital status:      Spouse name: Not on file    Number of children: Not on file    Years of education: Not on file    Highest education level: Not on file   Occupational History    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 Right lower extremity: Findings most compatible with chronic occlusion of    the above the knee popliteal artery with distal reconstitution.  There is    three-vessel runoff to the level of the ankle.  Two small aneurysms of the    popliteal artery are identified, the largest of which measures up to 1.3 cm.    2. Left lower extremity: Patent femoral to popliteal bypass graft with    findings most compatible with chronic occlusion of the superficial femoral    artery. Spencer Hendrix is three-vessel runoff to the level of the ankle. 3. Thin fluid collection within the subcutaneous fat of the right groin with    a suggestion of a defined wall, the possibility of an abscess cannot be    entirely excluded, correlate with visual inspection.  This measures at least    6 cm in the transverse dimension but less than 2 cm in thickness.  Please see    body of report for details. 4. Aneurysmal dilatation of the abdominal aorta measuring up to 5 cm in    greatest AP dimension as described in body of report. 5. Indeterminate 1.6 cm left adrenal nodule, this can be further evaluated    via dedicated outpatient adrenal protocol MRI. Cellulitis of the left inguinal tissues but no abscess.         5.6 cm saccular abdominal aortic aneurysm.  Recommend referral to a vascular    specialist.          Assessment: Asymptomatic 5.6 cm infrarenal abdominal aortic aneurysm. Known peripheral vascular disease including left femoral to below-knee popliteal bypass. Plan:  20-year-old female with asymptomatic 5.6 cm infrarenal abdominal aortic aneurysm. Will review imaging for possible fenestrated endograft however given appearance of neck may require open surgical repair. She has previously seen Dr. Hema Davison and will ask him to review imaging. Jonathon Pelaez M.D., FACS.  7/24/2020  10:34 AM

## 2020-07-29 ENCOUNTER — TELEPHONE (OUTPATIENT)
Dept: VASCULAR SURGERY | Age: 70
End: 2020-07-29

## 2020-08-04 ENCOUNTER — HOSPITAL ENCOUNTER (OUTPATIENT)
Dept: CT IMAGING | Age: 70
Discharge: HOME | End: 2020-08-04
Payer: MEDICARE

## 2020-08-04 PROCEDURE — 6360000004 HC RX CONTRAST MEDICATION

## 2020-08-04 PROCEDURE — 74174 CTA ABD&PLVS W/CONTRAST: CPT

## 2020-08-04 RX ADMIN — IOPAMIDOL 75 ML: 755 INJECTION, SOLUTION INTRAVENOUS at 13:57

## 2020-08-11 ENCOUNTER — TELEPHONE (OUTPATIENT)
Dept: VASCULAR SURGERY | Age: 70
End: 2020-08-11

## 2020-08-26 ENCOUNTER — OFFICE VISIT (OUTPATIENT)
Dept: VASCULAR SURGERY | Age: 70
End: 2020-08-26
Payer: MEDICARE

## 2020-08-26 VITALS
WEIGHT: 192 LBS | SYSTOLIC BLOOD PRESSURE: 167 MMHG | DIASTOLIC BLOOD PRESSURE: 82 MMHG | BODY MASS INDEX: 31.99 KG/M2 | HEART RATE: 97 BPM | HEIGHT: 65 IN

## 2020-08-26 PROCEDURE — 99214 OFFICE O/P EST MOD 30 MIN: CPT | Performed by: SURGERY

## 2020-08-26 NOTE — PROGRESS NOTES
file    Number of children: Not on file    Years of education: Not on file    Highest education level: Not on file   Occupational History    Not on file   Social Needs    Financial resource strain: Not on file    Food insecurity     Worry: Not on file     Inability: Not on file    Transportation needs     Medical: Not on file     Non-medical: Not on file   Tobacco Use    Smoking status: Current Every Day Smoker     Packs/day: 0.50     Years: 45.00     Pack years: 22.50     Types: Cigarettes     Last attempt to quit: 10/26/2019     Years since quittin.8    Smokeless tobacco: Never Used    Tobacco comment: been cutting back with electronic cig's   Substance and Sexual Activity    Alcohol use: Yes     Alcohol/week: 3.8 standard drinks     Types: 3 Cans of beer, 1 Standard drinks or equivalent per week    Drug use: No    Sexual activity: Not Currently   Lifestyle    Physical activity     Days per week: Not on file     Minutes per session: Not on file    Stress: Not on file   Relationships    Social connections     Talks on phone: Not on file     Gets together: Not on file     Attends Yarsanism service: Not on file     Active member of club or organization: Not on file     Attends meetings of clubs or organizations: Not on file     Relationship status: Not on file    Intimate partner violence     Fear of current or ex partner: Not on file     Emotionally abused: Not on file     Physically abused: Not on file     Forced sexual activity: Not on file   Other Topics Concern    Not on file   Social History Narrative    Not on file     Family History   Problem Relation Age of Onset    Heart Surgery Father          Review of Systems   All other systems reviewed and are negative. 15 pt ROS confirmed personally by this MD.    Objective:   Physical Exam  Vitals signs and nursing note reviewed. Constitutional:       Appearance: Normal appearance. HENT:      Head: Normocephalic and atraumatic.       Right  Additional measurements given above.         High-grade stenosis of the proximal left fem-pop bypass. Reviewed with Kiana Cleary for [facility] - size likely 5.2 cm. Small B EIAs and downward slopping renals concerning for kinking with covered stents that would require additional stents and may be an access problem. Assessment:      1) Enlarging asymptomatic juxtarenal AAA - not standard EVAR candidate  2) S/P L fem-pop  3) COPD  4) CAD  5) DM  6) HTN  7) Hyperlipidemia      Plan:      Considering issues at hand with FEVAR (small access, L groin cut down, renal angulation) have suggested open repair (tube graft with suprarenal clamp). Explained longer recovery and hospitalization (4-7 days) and low risk of renal insufficiency with no need for longer term surveillance. [facility] requires long term surveillance and has 10-20% risk of reintervention and/or renal target vessel complication (stenosis/thrombosis). Pt agrees to open approach. Will get preop myoview stress test, PFTs, room air ABG and donate one unit PRBC. Schedule per pt request in 4-6 weeks at [facility], [facility].

## 2020-08-28 ENCOUNTER — PREP FOR PROCEDURE (OUTPATIENT)
Dept: VASCULAR SURGERY | Age: 70
End: 2020-08-28

## 2020-08-28 ENCOUNTER — TELEPHONE (OUTPATIENT)
Dept: CARDIOTHORACIC SURGERY | Age: 70
End: 2020-08-28

## 2020-08-31 RX ORDER — GABAPENTIN 300 MG/1
CAPSULE ORAL
Qty: 180 CAPSULE | Refills: 0 | OUTPATIENT
Start: 2020-08-31

## 2020-08-31 RX ORDER — GABAPENTIN 300 MG/1
300 CAPSULE ORAL 2 TIMES DAILY
Qty: 180 CAPSULE | Refills: 0 | Status: SHIPPED | OUTPATIENT
Start: 2020-08-31 | End: 2022-07-08

## 2020-08-31 RX ORDER — CLOPIDOGREL BISULFATE 75 MG/1
TABLET ORAL
Qty: 30 TABLET | Refills: 3 | Status: SHIPPED | OUTPATIENT
Start: 2020-08-31

## 2020-09-01 RX ORDER — SODIUM CHLORIDE 0.9 % (FLUSH) 0.9 %
10 SYRINGE (ML) INJECTION PRN
Status: CANCELLED | OUTPATIENT
Start: 2020-09-01

## 2020-09-01 RX ORDER — SODIUM CHLORIDE 0.9 % (FLUSH) 0.9 %
10 SYRINGE (ML) INJECTION EVERY 12 HOURS SCHEDULED
Status: CANCELLED | OUTPATIENT
Start: 2020-09-01

## 2020-09-15 ENCOUNTER — HOSPITAL ENCOUNTER (OUTPATIENT)
Dept: NON INVASIVE DIAGNOSTICS | Age: 70
Discharge: HOME | End: 2020-09-15
Payer: MEDICARE

## 2020-09-15 LAB
LV EF: 70 %
LVEF MODALITY: NORMAL

## 2020-09-15 PROCEDURE — 3430000000 HC RX DIAGNOSTIC RADIOPHARMACEUTICAL: Performed by: SURGERY

## 2020-09-15 PROCEDURE — 78452 HT MUSCLE IMAGE SPECT MULT: CPT

## 2020-09-15 PROCEDURE — 93017 CV STRESS TEST TRACING ONLY: CPT

## 2020-09-15 PROCEDURE — 6360000002 HC RX W HCPCS: Performed by: SURGERY

## 2020-09-15 PROCEDURE — A9502 TC99M TETROFOSMIN: HCPCS | Performed by: SURGERY

## 2020-09-15 RX ADMIN — REGADENOSON 0.4 MG: 0.08 INJECTION, SOLUTION INTRAVENOUS at 12:16

## 2020-09-15 RX ADMIN — TETROFOSMIN 30 MILLICURIE: 1.38 INJECTION, POWDER, LYOPHILIZED, FOR SOLUTION INTRAVENOUS at 12:19

## 2020-09-15 RX ADMIN — TETROFOSMIN 10 MILLICURIE: 1.38 INJECTION, POWDER, LYOPHILIZED, FOR SOLUTION INTRAVENOUS at 11:29

## 2020-10-01 ENCOUNTER — TELEPHONE (OUTPATIENT)
Dept: VASCULAR SURGERY | Age: 70
End: 2020-10-01

## 2020-10-02 ENCOUNTER — OFFICE VISIT (OUTPATIENT)
Dept: PRIMARY CARE CLINIC | Age: 70
End: 2020-10-02
Payer: MEDICARE

## 2020-10-02 PROCEDURE — 99211 OFF/OP EST MAY X REQ PHY/QHP: CPT | Performed by: NURSE PRACTITIONER

## 2020-10-02 NOTE — PROGRESS NOTES
Lyric Ling received a viral test for COVID-19. They were educated on isolation and quarantine as appropriate. For any symptoms, they were directed to seek care from their PCP, given contact information to establish with a doctor, directed to an urgent care or the emergency room.

## 2020-10-02 NOTE — PATIENT INSTRUCTIONS

## 2020-10-04 LAB — SARS-COV-2, NAA: NOT DETECTED

## 2020-10-08 ENCOUNTER — HOSPITAL ENCOUNTER (OUTPATIENT)
Dept: PULMONOLOGY | Age: 70
Discharge: HOME | End: 2020-10-08
Payer: MEDICARE

## 2020-10-08 LAB
DLCO %PRED: 66 %
DLCO PRED: NORMAL
DLCO/VA %PRED: NORMAL
DLCO/VA PRED: NORMAL
DLCO/VA: NORMAL
DLCO: NORMAL
EXPIRATORY TIME: NORMAL
FEF 25-75% %PRED-PRE: NORMAL
FEF 25-75% PRED: NORMAL
FEF 25-75%-PRE: NORMAL
FEV1 %PRED-PRE: 53 %
FEV1 PRED: NORMAL
FEV1/FVC %PRED-PRE: NORMAL
FEV1/FVC PRED: NORMAL
FEV1/FVC: 68 %
FEV1: NORMAL
FVC %PRED-PRE: NORMAL
FVC PRED: NORMAL
FVC: NORMAL
GAW %PRED: NORMAL
GAW PRED: NORMAL
GAW: NORMAL
IC %PRED: NORMAL
IC PRED: NORMAL
IC: NORMAL
MVV %PRED-PRE: NORMAL
MVV PRED: NORMAL
MVV-PRE: NORMAL
PEF %PRED-PRE: NORMAL
PEF PRED: NORMAL
PEF-PRE: NORMAL
RAW %PRED: NORMAL
RAW PRED: NORMAL
RAW: NORMAL
RV %PRED: NORMAL
RV PRED: NORMAL
RV: NORMAL
SVC %PRED: NORMAL
SVC PRED: NORMAL
SVC: NORMAL
TLC %PRED: 72 %
TLC PRED: NORMAL
TLC: NORMAL
VA %PRED: NORMAL
VA PRED: NORMAL
VA: NORMAL
VTG %PRED: NORMAL
VTG PRED: NORMAL
VTG: NORMAL

## 2020-10-08 PROCEDURE — 94640 AIRWAY INHALATION TREATMENT: CPT

## 2020-10-08 PROCEDURE — 94010 BREATHING CAPACITY TEST: CPT | Performed by: INTERNAL MEDICINE

## 2020-10-08 PROCEDURE — 94060 EVALUATION OF WHEEZING: CPT

## 2020-10-08 PROCEDURE — 94729 DIFFUSING CAPACITY: CPT | Performed by: INTERNAL MEDICINE

## 2020-10-08 PROCEDURE — 94664 DEMO&/EVAL PT USE INHALER: CPT

## 2020-10-08 PROCEDURE — 94726 PLETHYSMOGRAPHY LUNG VOLUMES: CPT

## 2020-10-08 PROCEDURE — 94726 PLETHYSMOGRAPHY LUNG VOLUMES: CPT | Performed by: INTERNAL MEDICINE

## 2020-10-08 PROCEDURE — 94729 DIFFUSING CAPACITY: CPT

## 2020-10-08 ASSESSMENT — PULMONARY FUNCTION TESTS
FEV1/FVC: 68
FEV1_PERCENT_PREDICTED_PRE: 53

## 2020-10-09 ENCOUNTER — OFFICE VISIT (OUTPATIENT)
Dept: PRIMARY CARE CLINIC | Age: 70
End: 2020-10-09
Payer: MEDICARE

## 2020-10-09 PROCEDURE — 99211 OFF/OP EST MAY X REQ PHY/QHP: CPT | Performed by: NURSE PRACTITIONER

## 2020-10-11 LAB — SARS-COV-2, NAA: NOT DETECTED

## 2020-10-12 ENCOUNTER — HOSPITAL ENCOUNTER (OUTPATIENT)
Dept: PREADMISSION TESTING | Age: 70
Discharge: HOME | End: 2020-10-16
Payer: MEDICARE

## 2020-10-12 ENCOUNTER — HOSPITAL ENCOUNTER (OUTPATIENT)
Dept: GENERAL RADIOLOGY | Age: 70
Discharge: HOME | End: 2020-10-12
Payer: MEDICARE

## 2020-10-12 ENCOUNTER — ANESTHESIA EVENT (OUTPATIENT)
Dept: OPERATING ROOM | Age: 70
DRG: 268 | End: 2020-10-12
Payer: MEDICARE

## 2020-10-12 LAB
ABO/RH: NORMAL
ANION GAP SERPL CALCULATED.3IONS-SCNC: 11 MMOL/L (ref 3–16)
ANTIBODY SCREEN: NORMAL
BILIRUBIN URINE: NEGATIVE
BLOOD, URINE: NEGATIVE
BUN BLDV-MCNC: 8 MG/DL (ref 7–20)
CALCIUM SERPL-MCNC: 9.9 MG/DL (ref 8.3–10.6)
CHLORIDE BLD-SCNC: 102 MMOL/L (ref 99–110)
CLARITY: ABNORMAL
CO2: 28 MMOL/L (ref 21–32)
COLOR: YELLOW
CREAT SERPL-MCNC: 1 MG/DL (ref 0.6–1.2)
EKG ATRIAL RATE: 73 BPM
EKG DIAGNOSIS: NORMAL
EKG P AXIS: 21 DEGREES
EKG P-R INTERVAL: 148 MS
EKG Q-T INTERVAL: 420 MS
EKG QRS DURATION: 84 MS
EKG QTC CALCULATION (BAZETT): 462 MS
EKG R AXIS: 19 DEGREES
EKG T AXIS: 72 DEGREES
EKG VENTRICULAR RATE: 73 BPM
EPITHELIAL CELLS, UA: 0 /HPF (ref 0–5)
GFR AFRICAN AMERICAN: >60
GFR NON-AFRICAN AMERICAN: 54
GLUCOSE BLD-MCNC: 126 MG/DL (ref 70–99)
GLUCOSE URINE: NEGATIVE MG/DL
HCT VFR BLD CALC: 43.6 % (ref 36–48)
HEMOGLOBIN: 14.4 G/DL (ref 12–16)
HYALINE CASTS: 0 /LPF (ref 0–8)
INR BLD: 0.95 (ref 0.86–1.14)
KETONES, URINE: NEGATIVE MG/DL
LEUKOCYTE ESTERASE, URINE: NEGATIVE
MCH RBC QN AUTO: 29.8 PG (ref 26–34)
MCHC RBC AUTO-ENTMCNC: 33 G/DL (ref 31–36)
MCV RBC AUTO: 90.1 FL (ref 80–100)
MICROSCOPIC EXAMINATION: YES
NITRITE, URINE: NEGATIVE
PDW BLD-RTO: 15.2 % (ref 12.4–15.4)
PH UA: 7.5 (ref 5–8)
PLATELET # BLD: 246 K/UL (ref 135–450)
PMV BLD AUTO: 8 FL (ref 5–10.5)
POTASSIUM SERPL-SCNC: 4.4 MMOL/L (ref 3.5–5.1)
PROTEIN UA: NEGATIVE MG/DL
PROTHROMBIN TIME: 11 SEC (ref 10–13.2)
RBC # BLD: 4.83 M/UL (ref 4–5.2)
RBC UA: 1 /HPF (ref 0–4)
SODIUM BLD-SCNC: 141 MMOL/L (ref 136–145)
SPECIFIC GRAVITY UA: 1.01 (ref 1–1.03)
URINE TYPE: ABNORMAL
UROBILINOGEN, URINE: 0.2 E.U./DL
WBC # BLD: 6.3 K/UL (ref 4–11)
WBC UA: 0 /HPF (ref 0–5)

## 2020-10-12 PROCEDURE — 85610 PROTHROMBIN TIME: CPT

## 2020-10-12 PROCEDURE — 71046 X-RAY EXAM CHEST 2 VIEWS: CPT

## 2020-10-12 PROCEDURE — 86901 BLOOD TYPING SEROLOGIC RH(D): CPT

## 2020-10-12 PROCEDURE — 93010 ELECTROCARDIOGRAM REPORT: CPT | Performed by: INTERNAL MEDICINE

## 2020-10-12 PROCEDURE — 86900 BLOOD TYPING SEROLOGIC ABO: CPT

## 2020-10-12 PROCEDURE — 81001 URINALYSIS AUTO W/SCOPE: CPT

## 2020-10-12 PROCEDURE — 93005 ELECTROCARDIOGRAM TRACING: CPT

## 2020-10-12 PROCEDURE — 80048 BASIC METABOLIC PNL TOTAL CA: CPT

## 2020-10-12 PROCEDURE — 85027 COMPLETE CBC AUTOMATED: CPT

## 2020-10-12 PROCEDURE — 86850 RBC ANTIBODY SCREEN: CPT

## 2020-10-15 ENCOUNTER — PREP FOR PROCEDURE (OUTPATIENT)
Dept: VASCULAR SURGERY | Age: 70
End: 2020-10-15

## 2020-10-15 NOTE — PROGRESS NOTES
Preoperative Screening for Elective Surgery/Invasive Procedures While COVID-19 present in the community     Have you tested positive or have been told to self-isolate for COVID-19 like symptoms within the past 28 days? n   Do you currently have any of the following symptoms? o Fever >100.0 F or 99.9 F in immunocompromised patients?n  o New onset cough, shortness of breath or difficulty breathing?n  o New onset sore throat, myalgia (muscle aches and pains), headache, loss of taste/smell or diarrhea?n   Have you had a potential exposure to COVID-19 within the past 14 days by:  o Close contact with a confirmed case?n  o Close contact with a healthcare worker,  or essential infrastructure worker (grocery store, [employer], gas station, public utilities or transportation)? YES  o Do you reside in a congregate setting such as; skilled nursing facility, adult home, correctional facility, homeless shelter or other institutional setting? n  o Have you had recent travel to a known COVID-19 hotspot? Pt resides in [facility] if the patient has a positive screen by answering yes to one or more of the above questions. Patients who test positive or screen positive prior to surgery or on the day of surgery should be evaluated in conjunction with the surgeon/proceduralist/anesthesiologist to determine the urgency of the procedure.

## 2020-10-15 NOTE — PROGRESS NOTES
C-Difficile admission screening and protocol:     * Admitted with diarrhea? n     *Prior history of C-Diff. In last 3 months?n     *Antibiotic use in the past 6-8 weeks?n     *Prior hospitalization or nursing home in the last month?n        [address] time_____7am per [facility] office/ OR 1130 _______        Surgery time____________    Take the following medications with a sip of water:    Do not eat or drink anything after 12:00 midnight prior to your surgery. This includes water chewing gum, mints and ice chips. You may brush your teeth and gargle the morning of your surgery, but do not swallow the water     Please see your family doctor/pediatrician for a history and physical and/or concerning medications. Bring any test results/reports from your physicians office. If you are under the care of a heart doctor or specialist doctor, please be aware that you may be asked to them for clearance    You may be asked to stop blood thinners such as Coumadin, Plavix, Fragmin, Lovenox, etc., or any anti-inflammatories such as:  Aspirin, Ibuprofen, Advil, Naproxen prior to your surgery. We also ask that you stop any OTC medications such as fish oil, vitamin E, glucosamine, garlic, Multivitamins, COQ 10, etc.    We ask that you do not smoke 24 hours prior to surgery  We ask that you do not  drink any alcoholic beverages 24 hours prior to surgery     You must make arrangements for a responsible adult to take you home after your surgery. For your safety you will not be allowed to leave alone or drive yourself home. Your surgery will be cancelled if you do not have a ride home. Also for your safety, it is strongly suggested that someone stay with you the first 24 hours after your surgery. A parent or legal guardian must accompany a child scheduled for surgery and plan to stay at the hospital until the child is discharged.     Please do not bring other children with you.    For your comfort, please wear simple loose fitting clothing to the hospital.  Please do not bring valuables. Do not wear any make-up or nail polish on your fingers or toes      For your safety, please do not wear any jewelry or body piercing's on the day of surgery. All jewelry must be removed. If you have dentures, they will be removed before going to operating room. For your convenience, we will provide you with a container. If you wear contact lenses or glasses, they will be removed, please bring a case for them. If you have a living will and a durable power of  for healthcare, please bring in a copy. As part of our patient safety program to minimize surgical site infections, we ask you to do the following:    · Please notify your surgeon if you develop any illness between         now and the  day of your surgery. · This includes a cough, cold, fever, sore throat, nausea,         or vomiting, and diarrhea, etc.  ·  Please notify your surgeon if you experience dizziness, shortness         of breath or blurred vision between now and the time of your surgery. Do not shave your operative site 96 hours prior to surgery. For face and neck surgery, men may use an electric razor 48 hours   prior to surgery. You may shower the night before surgery or the morning of   your surgery with an antibacterial soap. You will need to bring a photo ID and insurance card    [facility] has an onsite pharmacy, would you like to utilize our pharmacy     If you will be staying overnight and use a C-pap machine, please bring   your C-pap to hospital     Our goal is to provide you with excellent care, therefore, visitors will be limited to two(2) in the room at a time so that we may focus on providing this care for you. Please contact pre-admission testing if you have any further questions.                  [facility] phone number:  [phone] PAT fax number: [phone]  Please note these are generalized instructions for all surgical cases, you may be provided with more specific instructions according to your surgery.

## 2020-10-15 NOTE — PROGRESS NOTES
Called and spoke w Blood bank- pt states she had her blood donated prior to DOS  Blood bank confirms this, I called and left message w Margaux at [facility] office [phone] that they need to place dos Type and Screen per Blood bank protocol.     Chart sent for surgery

## 2020-10-16 ENCOUNTER — ANESTHESIA (OUTPATIENT)
Dept: OPERATING ROOM | Age: 70
DRG: 268 | End: 2020-10-16
Payer: MEDICARE

## 2020-10-16 ENCOUNTER — APPOINTMENT (OUTPATIENT)
Dept: GENERAL RADIOLOGY | Age: 70
DRG: 268 | End: 2020-10-16
Attending: SURGERY
Payer: MEDICARE

## 2020-10-16 ENCOUNTER — HOSPITAL ENCOUNTER (INPATIENT)
Age: 70
LOS: 7 days | Discharge: HOME HEALTH | DRG: 268 | End: 2020-10-23
Attending: SURGERY | Admitting: SURGERY
Payer: MEDICARE

## 2020-10-16 VITALS
RESPIRATION RATE: 10 BRPM | TEMPERATURE: 95.2 F | OXYGEN SATURATION: 100 % | SYSTOLIC BLOOD PRESSURE: 154 MMHG | DIASTOLIC BLOOD PRESSURE: 70 MMHG

## 2020-10-16 PROBLEM — I71.40 AAA (ABDOMINAL AORTIC ANEURYSM): Status: ACTIVE | Noted: 2020-10-16

## 2020-10-16 LAB
ABO/RH: NORMAL
ANION GAP SERPL CALCULATED.3IONS-SCNC: 8 MMOL/L (ref 3–16)
ANION GAP SERPL CALCULATED.3IONS-SCNC: 9 MMOL/L (ref 3–16)
ANTIBODY SCREEN: NORMAL
BASE EXCESS ARTERIAL: -1 MMOL/L (ref -3–3)
BASE EXCESS ARTERIAL: -1.2 MMOL/L (ref -3–3)
BASE EXCESS ARTERIAL: -1.3 MMOL/L (ref -3–3)
BASE EXCESS ARTERIAL: -2.2 MMOL/L (ref -3–3)
BASE EXCESS ARTERIAL: -2.6 MMOL/L (ref -3–3)
BASE EXCESS ARTERIAL: -7.6 MMOL/L (ref -3–3)
BLOOD BANK DISPENSE STATUS: NORMAL
BLOOD BANK PRODUCT CODE: NORMAL
BPU ID: NORMAL
BUN BLDV-MCNC: 12 MG/DL (ref 7–20)
BUN BLDV-MCNC: 13 MG/DL (ref 7–20)
CALCIUM SERPL-MCNC: 6.6 MG/DL (ref 8.3–10.6)
CALCIUM SERPL-MCNC: 6.9 MG/DL (ref 8.3–10.6)
CARBOXYHEMOGLOBIN ARTERIAL: 1.5 % (ref 0–1.5)
CARBOXYHEMOGLOBIN ARTERIAL: 1.8 % (ref 0–1.5)
CARBOXYHEMOGLOBIN ARTERIAL: 2.3 % (ref 0–1.5)
CARBOXYHEMOGLOBIN ARTERIAL: 2.4 % (ref 0–1.5)
CARBOXYHEMOGLOBIN ARTERIAL: 2.4 % (ref 0–1.5)
CARBOXYHEMOGLOBIN ARTERIAL: 2.9 % (ref 0–1.5)
CHLORIDE BLD-SCNC: 112 MMOL/L (ref 99–110)
CHLORIDE BLD-SCNC: 113 MMOL/L (ref 99–110)
CO2: 24 MMOL/L (ref 21–32)
CO2: 25 MMOL/L (ref 21–32)
CREAT SERPL-MCNC: 0.9 MG/DL (ref 0.6–1.2)
CREAT SERPL-MCNC: 0.9 MG/DL (ref 0.6–1.2)
DESCRIPTION BLOOD BANK: NORMAL
GFR AFRICAN AMERICAN: >60
GFR AFRICAN AMERICAN: >60
GFR NON-AFRICAN AMERICAN: >60
GFR NON-AFRICAN AMERICAN: >60
GLUCOSE BLD-MCNC: 106 MG/DL (ref 70–99)
GLUCOSE BLD-MCNC: 156 MG/DL (ref 70–99)
GLUCOSE BLD-MCNC: 185 MG/DL (ref 70–99)
GLUCOSE BLD-MCNC: 185 MG/DL (ref 70–99)
GLUCOSE BLD-MCNC: 186 MG/DL (ref 70–99)
HCO3 ARTERIAL: 18.4 MMOL/L (ref 21–29)
HCO3 ARTERIAL: 22.7 MMOL/L (ref 21–29)
HCO3 ARTERIAL: 23.4 MMOL/L (ref 21–29)
HCO3 ARTERIAL: 24.3 MMOL/L (ref 21–29)
HCO3 ARTERIAL: 24.4 MMOL/L (ref 21–29)
HCO3 ARTERIAL: 25.8 MMOL/L (ref 21–29)
HCT VFR BLD CALC: 32.4 % (ref 36–48)
HCT VFR BLD CALC: 39.3 % (ref 36–48)
HCT VFR BLD CALC: 43.2 % (ref 36–48)
HEMOGLOBIN, ART, EXTENDED: 12.3 G/DL (ref 12–16)
HEMOGLOBIN, ART, EXTENDED: 13.2 G/DL (ref 12–16)
HEMOGLOBIN, ART, EXTENDED: 13.2 G/DL (ref 12–16)
HEMOGLOBIN, ART, EXTENDED: 13.4 G/DL (ref 12–16)
HEMOGLOBIN, ART, EXTENDED: 9.7 G/DL (ref 12–16)
HEMOGLOBIN, ART, EXTENDED: 9.9 G/DL (ref 12–16)
HEMOGLOBIN: 10.8 G/DL (ref 12–16)
HEMOGLOBIN: 12.8 G/DL (ref 12–16)
HEMOGLOBIN: 14.4 G/DL (ref 12–16)
MCH RBC QN AUTO: 29.4 PG (ref 26–34)
MCH RBC QN AUTO: 29.7 PG (ref 26–34)
MCHC RBC AUTO-ENTMCNC: 32.7 G/DL (ref 31–36)
MCHC RBC AUTO-ENTMCNC: 33.2 G/DL (ref 31–36)
MCV RBC AUTO: 89.3 FL (ref 80–100)
MCV RBC AUTO: 89.9 FL (ref 80–100)
METHEMOGLOBIN ARTERIAL: 0.3 %
METHEMOGLOBIN ARTERIAL: 0.4 %
METHEMOGLOBIN ARTERIAL: 0.5 %
METHEMOGLOBIN ARTERIAL: 0.7 %
METHEMOGLOBIN ARTERIAL: 0.9 %
METHEMOGLOBIN ARTERIAL: 0.9 %
O2 CONTENT ARTERIAL: 14 ML/DL
O2 CONTENT ARTERIAL: 14 ML/DL
O2 CONTENT ARTERIAL: 17 ML/DL
O2 CONTENT ARTERIAL: 18 ML/DL
O2 SAT, ARTERIAL: 100 %
O2 SAT, ARTERIAL: 97 %
O2 SAT, ARTERIAL: 98.7 %
O2 SAT, ARTERIAL: 99.6 %
O2 THERAPY: ABNORMAL
PCO2 ARTERIAL: 38.3 MMHG (ref 35–45)
PCO2 ARTERIAL: 38.5 MMHG (ref 35–45)
PCO2 ARTERIAL: 38.7 MMHG (ref 35–45)
PCO2 ARTERIAL: 42.9 MMHG (ref 35–45)
PCO2 ARTERIAL: 50.3 MMHG (ref 35–45)
PCO2 ARTERIAL: 50.6 MMHG (ref 35–45)
PDW BLD-RTO: 14.9 % (ref 12.4–15.4)
PDW BLD-RTO: 14.9 % (ref 12.4–15.4)
PERFORMED ON: ABNORMAL
PH ARTERIAL: 7.29 (ref 7.35–7.45)
PH ARTERIAL: 7.29 (ref 7.35–7.45)
PH ARTERIAL: 7.32 (ref 7.35–7.45)
PH ARTERIAL: 7.36 (ref 7.35–7.45)
PH ARTERIAL: 7.38 (ref 7.35–7.45)
PH ARTERIAL: 7.39 (ref 7.35–7.45)
PLATELET # BLD: 200 K/UL (ref 135–450)
PLATELET # BLD: 242 K/UL (ref 135–450)
PMV BLD AUTO: 7.6 FL (ref 5–10.5)
PMV BLD AUTO: 7.8 FL (ref 5–10.5)
PO2 ARTERIAL: 108 MMHG (ref 75–108)
PO2 ARTERIAL: 133 MMHG (ref 75–108)
PO2 ARTERIAL: 188 MMHG (ref 75–108)
PO2 ARTERIAL: 215 MMHG (ref 75–108)
PO2 ARTERIAL: 260 MMHG (ref 75–108)
PO2 ARTERIAL: 89 MMHG (ref 75–108)
POTASSIUM SERPL-SCNC: 3.4 MMOL/L (ref 3.5–5.1)
POTASSIUM SERPL-SCNC: 3.6 MMOL/L (ref 3.5–5.1)
RBC # BLD: 4.37 M/UL (ref 4–5.2)
RBC # BLD: 4.84 M/UL (ref 4–5.2)
SARS-COV-2, NAAT: NOT DETECTED
SODIUM BLD-SCNC: 144 MMOL/L (ref 136–145)
SODIUM BLD-SCNC: 147 MMOL/L (ref 136–145)
TCO2 ARTERIAL: 19.6 MMOL/L
TCO2 ARTERIAL: 23.9 MMOL/L
TCO2 ARTERIAL: 24.6 MMOL/L
TCO2 ARTERIAL: 25.6 MMOL/L
TCO2 ARTERIAL: 26 MMOL/L
TCO2 ARTERIAL: 27.3 MMOL/L
WBC # BLD: 14.1 K/UL (ref 4–11)
WBC # BLD: 7.4 K/UL (ref 4–11)

## 2020-10-16 PROCEDURE — P9016 RBC LEUKOCYTES REDUCED: HCPCS

## 2020-10-16 PROCEDURE — 04C00ZZ EXTIRPATION OF MATTER FROM ABDOMINAL AORTA, OPEN APPROACH: ICD-10-PCS | Performed by: SURGERY

## 2020-10-16 PROCEDURE — 3600000006 HC SURGERY LEVEL 6 BASE: Performed by: SURGERY

## 2020-10-16 PROCEDURE — 85014 HEMATOCRIT: CPT

## 2020-10-16 PROCEDURE — 86850 RBC ANTIBODY SCREEN: CPT

## 2020-10-16 PROCEDURE — 6360000002 HC RX W HCPCS: Performed by: NURSE ANESTHETIST, CERTIFIED REGISTERED

## 2020-10-16 PROCEDURE — 04V00DZ RESTRICTION OF ABDOMINAL AORTA WITH INTRALUMINAL DEVICE, OPEN APPROACH: ICD-10-PCS | Performed by: SURGERY

## 2020-10-16 PROCEDURE — U0002 COVID-19 LAB TEST NON-CDC: HCPCS

## 2020-10-16 PROCEDURE — 6370000000 HC RX 637 (ALT 250 FOR IP): Performed by: SURGERY

## 2020-10-16 PROCEDURE — 3700000001 HC ADD 15 MINUTES (ANESTHESIA): Performed by: SURGERY

## 2020-10-16 PROCEDURE — 80048 BASIC METABOLIC PNL TOTAL CA: CPT

## 2020-10-16 PROCEDURE — 2580000003 HC RX 258: Performed by: SURGERY

## 2020-10-16 PROCEDURE — 3600000016 HC SURGERY LEVEL 6 ADDTL 15MIN: Performed by: SURGERY

## 2020-10-16 PROCEDURE — 2100000000 HC CCU R&B

## 2020-10-16 PROCEDURE — 94002 VENT MGMT INPAT INIT DAY: CPT

## 2020-10-16 PROCEDURE — 86923 COMPATIBILITY TEST ELECTRIC: CPT

## 2020-10-16 PROCEDURE — 2500000003 HC RX 250 WO HCPCS: Performed by: SURGERY

## 2020-10-16 PROCEDURE — 0BH18EZ INSERTION OF ENDOTRACHEAL AIRWAY INTO TRACHEA, VIA NATURAL OR ARTIFICIAL OPENING ENDOSCOPIC: ICD-10-PCS | Performed by: SURGERY

## 2020-10-16 PROCEDURE — 71045 X-RAY EXAM CHEST 1 VIEW: CPT

## 2020-10-16 PROCEDURE — P9041 ALBUMIN (HUMAN),5%, 50ML: HCPCS | Performed by: NURSE ANESTHETIST, CERTIFIED REGISTERED

## 2020-10-16 PROCEDURE — 6360000002 HC RX W HCPCS: Performed by: SURGERY

## 2020-10-16 PROCEDURE — 85018 HEMOGLOBIN: CPT

## 2020-10-16 PROCEDURE — L8670 VASCULAR GRAFT, SYNTHETIC: HCPCS | Performed by: SURGERY

## 2020-10-16 PROCEDURE — 2709999900 HC NON-CHARGEABLE SUPPLY: Performed by: SURGERY

## 2020-10-16 PROCEDURE — 2500000003 HC RX 250 WO HCPCS: Performed by: NURSE ANESTHETIST, CERTIFIED REGISTERED

## 2020-10-16 PROCEDURE — 02HV33Z INSERTION OF INFUSION DEVICE INTO SUPERIOR VENA CAVA, PERCUTANEOUS APPROACH: ICD-10-PCS | Performed by: ANESTHESIOLOGY

## 2020-10-16 PROCEDURE — 6360000002 HC RX W HCPCS

## 2020-10-16 PROCEDURE — 3700000000 HC ANESTHESIA ATTENDED CARE: Performed by: SURGERY

## 2020-10-16 PROCEDURE — 35081 REPAIR DEFECT OF ARTERY: CPT | Performed by: SURGERY

## 2020-10-16 PROCEDURE — 85027 COMPLETE CBC AUTOMATED: CPT

## 2020-10-16 PROCEDURE — 2580000003 HC RX 258: Performed by: NURSE ANESTHETIST, CERTIFIED REGISTERED

## 2020-10-16 PROCEDURE — 86900 BLOOD TYPING SEROLOGIC ABO: CPT

## 2020-10-16 PROCEDURE — 82803 BLOOD GASES ANY COMBINATION: CPT

## 2020-10-16 PROCEDURE — 5A1935Z RESPIRATORY VENTILATION, LESS THAN 24 CONSECUTIVE HOURS: ICD-10-PCS | Performed by: SURGERY

## 2020-10-16 PROCEDURE — 86901 BLOOD TYPING SEROLOGIC RH(D): CPT

## 2020-10-16 DEVICE — ALBOGRAFT POLYESTER VASCULAR GRAFT 30CMX16MM
Type: IMPLANTABLE DEVICE | Site: ABDOMEN | Status: FUNCTIONAL
Brand: ALBOGRAFT POLYESTER VASCULAR GRAFT

## 2020-10-16 RX ORDER — LIDOCAINE HYDROCHLORIDE 20 MG/ML
INJECTION, SOLUTION EPIDURAL; INFILTRATION; INTRACAUDAL; PERINEURAL PRN
Status: DISCONTINUED | OUTPATIENT
Start: 2020-10-16 | End: 2020-10-16 | Stop reason: SDUPTHER

## 2020-10-16 RX ORDER — NITROGLYCERIN 20 MG/100ML
INJECTION INTRAVENOUS PRN
Status: DISCONTINUED | OUTPATIENT
Start: 2020-10-16 | End: 2020-10-16 | Stop reason: SDUPTHER

## 2020-10-16 RX ORDER — SODIUM CHLORIDE 9 MG/ML
INJECTION, SOLUTION INTRAVENOUS CONTINUOUS
Status: DISCONTINUED | OUTPATIENT
Start: 2020-10-16 | End: 2020-10-16 | Stop reason: SDUPTHER

## 2020-10-16 RX ORDER — SODIUM CHLORIDE 9 MG/ML
INJECTION, SOLUTION INTRAVENOUS CONTINUOUS PRN
Status: DISCONTINUED | OUTPATIENT
Start: 2020-10-16 | End: 2020-10-16 | Stop reason: SDUPTHER

## 2020-10-16 RX ORDER — NICOTINE POLACRILEX 4 MG
15 LOZENGE BUCCAL PRN
Status: DISCONTINUED | OUTPATIENT
Start: 2020-10-16 | End: 2020-10-17 | Stop reason: SDUPTHER

## 2020-10-16 RX ORDER — ONDANSETRON 2 MG/ML
INJECTION INTRAMUSCULAR; INTRAVENOUS PRN
Status: DISCONTINUED | OUTPATIENT
Start: 2020-10-16 | End: 2020-10-16 | Stop reason: SDUPTHER

## 2020-10-16 RX ORDER — CALCIUM GLUCONATE 20 MG/ML
1 INJECTION, SOLUTION INTRAVENOUS ONCE
Status: COMPLETED | OUTPATIENT
Start: 2020-10-16 | End: 2020-10-16

## 2020-10-16 RX ORDER — PROPOFOL 10 MG/ML
INJECTION, EMULSION INTRAVENOUS PRN
Status: DISCONTINUED | OUTPATIENT
Start: 2020-10-16 | End: 2020-10-16 | Stop reason: SDUPTHER

## 2020-10-16 RX ORDER — ALBUMIN, HUMAN INJ 5% 5 %
SOLUTION INTRAVENOUS PRN
Status: DISCONTINUED | OUTPATIENT
Start: 2020-10-16 | End: 2020-10-16 | Stop reason: SDUPTHER

## 2020-10-16 RX ORDER — HYDROCODONE BITARTRATE AND ACETAMINOPHEN 5; 325 MG/1; MG/1
2 TABLET ORAL PRN
Status: DISCONTINUED | OUTPATIENT
Start: 2020-10-16 | End: 2020-10-16 | Stop reason: HOSPADM

## 2020-10-16 RX ORDER — MEPERIDINE HYDROCHLORIDE 25 MG/ML
12.5 INJECTION INTRAMUSCULAR; INTRAVENOUS; SUBCUTANEOUS
Status: DISCONTINUED | OUTPATIENT
Start: 2020-10-16 | End: 2020-10-16 | Stop reason: HOSPADM

## 2020-10-16 RX ORDER — HEPARIN SODIUM 1000 [USP'U]/ML
INJECTION, SOLUTION INTRAVENOUS; SUBCUTANEOUS PRN
Status: DISCONTINUED | OUTPATIENT
Start: 2020-10-16 | End: 2020-10-16 | Stop reason: SDUPTHER

## 2020-10-16 RX ORDER — SODIUM CHLORIDE 9 MG/ML
INJECTION, SOLUTION INTRAVENOUS CONTINUOUS
Status: DISCONTINUED | OUTPATIENT
Start: 2020-10-16 | End: 2020-10-16

## 2020-10-16 RX ORDER — SODIUM CHLORIDE 9 MG/ML
INJECTION, SOLUTION INTRAVENOUS CONTINUOUS
Status: DISCONTINUED | OUTPATIENT
Start: 2020-10-16 | End: 2020-10-19

## 2020-10-16 RX ORDER — PROMETHAZINE HYDROCHLORIDE 25 MG/1
12.5 TABLET ORAL EVERY 6 HOURS PRN
Status: DISCONTINUED | OUTPATIENT
Start: 2020-10-16 | End: 2020-10-23 | Stop reason: HOSPADM

## 2020-10-16 RX ORDER — SODIUM CHLORIDE 0.9 % (FLUSH) 0.9 %
10 SYRINGE (ML) INJECTION EVERY 12 HOURS SCHEDULED
Status: DISCONTINUED | OUTPATIENT
Start: 2020-10-16 | End: 2020-10-23 | Stop reason: HOSPADM

## 2020-10-16 RX ORDER — CHLORHEXIDINE GLUCONATE 0.12 MG/ML
15 RINSE ORAL 2 TIMES DAILY
Status: DISCONTINUED | OUTPATIENT
Start: 2020-10-16 | End: 2020-10-17

## 2020-10-16 RX ORDER — PROMETHAZINE HYDROCHLORIDE 25 MG/ML
6.25 INJECTION, SOLUTION INTRAMUSCULAR; INTRAVENOUS
Status: DISCONTINUED | OUTPATIENT
Start: 2020-10-16 | End: 2020-10-16 | Stop reason: HOSPADM

## 2020-10-16 RX ORDER — 0.9 % SODIUM CHLORIDE 0.9 %
500 INTRAVENOUS SOLUTION INTRAVENOUS ONCE
Status: COMPLETED | OUTPATIENT
Start: 2020-10-16 | End: 2020-10-16

## 2020-10-16 RX ORDER — SODIUM CHLORIDE 0.9 % (FLUSH) 0.9 %
10 SYRINGE (ML) INJECTION PRN
Status: DISCONTINUED | OUTPATIENT
Start: 2020-10-16 | End: 2020-10-23 | Stop reason: HOSPADM

## 2020-10-16 RX ORDER — PHENYLEPHRINE HCL IN 0.9% NACL 1 MG/10 ML
SYRINGE (ML) INTRAVENOUS PRN
Status: DISCONTINUED | OUTPATIENT
Start: 2020-10-16 | End: 2020-10-16 | Stop reason: SDUPTHER

## 2020-10-16 RX ORDER — ROCURONIUM BROMIDE 10 MG/ML
INJECTION, SOLUTION INTRAVENOUS PRN
Status: DISCONTINUED | OUTPATIENT
Start: 2020-10-16 | End: 2020-10-16 | Stop reason: SDUPTHER

## 2020-10-16 RX ORDER — MIDAZOLAM HYDROCHLORIDE 1 MG/ML
INJECTION INTRAMUSCULAR; INTRAVENOUS PRN
Status: DISCONTINUED | OUTPATIENT
Start: 2020-10-16 | End: 2020-10-16 | Stop reason: SDUPTHER

## 2020-10-16 RX ORDER — PROPOFOL 10 MG/ML
12 INJECTION, EMULSION INTRAVENOUS CONTINUOUS PRN
Status: DISCONTINUED | OUTPATIENT
Start: 2020-10-16 | End: 2020-10-17

## 2020-10-16 RX ORDER — 0.9 % SODIUM CHLORIDE 0.9 %
250 INTRAVENOUS SOLUTION INTRAVENOUS ONCE
Status: DISCONTINUED | OUTPATIENT
Start: 2020-10-16 | End: 2020-10-16

## 2020-10-16 RX ORDER — FENTANYL CITRATE 50 UG/ML
INJECTION, SOLUTION INTRAMUSCULAR; INTRAVENOUS PRN
Status: DISCONTINUED | OUTPATIENT
Start: 2020-10-16 | End: 2020-10-16 | Stop reason: SDUPTHER

## 2020-10-16 RX ORDER — DEXTROSE MONOHYDRATE 25 G/50ML
12.5 INJECTION, SOLUTION INTRAVENOUS PRN
Status: DISCONTINUED | OUTPATIENT
Start: 2020-10-16 | End: 2020-10-17 | Stop reason: SDUPTHER

## 2020-10-16 RX ORDER — DEXTROSE MONOHYDRATE 50 MG/ML
100 INJECTION, SOLUTION INTRAVENOUS PRN
Status: DISCONTINUED | OUTPATIENT
Start: 2020-10-16 | End: 2020-10-17 | Stop reason: SDUPTHER

## 2020-10-16 RX ORDER — SODIUM CHLORIDE 0.9 % (FLUSH) 0.9 %
10 SYRINGE (ML) INJECTION EVERY 12 HOURS SCHEDULED
Status: DISCONTINUED | OUTPATIENT
Start: 2020-10-16 | End: 2020-10-16 | Stop reason: HOSPADM

## 2020-10-16 RX ORDER — HYDRALAZINE HYDROCHLORIDE 20 MG/ML
5 INJECTION INTRAMUSCULAR; INTRAVENOUS EVERY 10 MIN PRN
Status: DISCONTINUED | OUTPATIENT
Start: 2020-10-16 | End: 2020-10-16 | Stop reason: HOSPADM

## 2020-10-16 RX ORDER — PROPOFOL 10 MG/ML
10 INJECTION, EMULSION INTRAVENOUS CONTINUOUS
Status: DISCONTINUED | OUTPATIENT
Start: 2020-10-16 | End: 2020-10-17

## 2020-10-16 RX ORDER — FENTANYL CITRATE 50 UG/ML
25 INJECTION, SOLUTION INTRAMUSCULAR; INTRAVENOUS EVERY 5 MIN PRN
Status: DISCONTINUED | OUTPATIENT
Start: 2020-10-16 | End: 2020-10-16 | Stop reason: HOSPADM

## 2020-10-16 RX ORDER — FENTANYL CITRATE 50 UG/ML
50 INJECTION, SOLUTION INTRAMUSCULAR; INTRAVENOUS EVERY 5 MIN PRN
Status: DISCONTINUED | OUTPATIENT
Start: 2020-10-16 | End: 2020-10-16 | Stop reason: HOSPADM

## 2020-10-16 RX ORDER — SODIUM CHLORIDE 0.9 % (FLUSH) 0.9 %
10 SYRINGE (ML) INJECTION EVERY 12 HOURS SCHEDULED
Status: DISCONTINUED | OUTPATIENT
Start: 2020-10-16 | End: 2020-10-16 | Stop reason: SDUPTHER

## 2020-10-16 RX ORDER — SODIUM CHLORIDE 0.9 % (FLUSH) 0.9 %
10 SYRINGE (ML) INJECTION PRN
Status: DISCONTINUED | OUTPATIENT
Start: 2020-10-16 | End: 2020-10-16 | Stop reason: HOSPADM

## 2020-10-16 RX ORDER — PRAVASTATIN SODIUM 40 MG
40 TABLET ORAL DAILY
Status: DISCONTINUED | OUTPATIENT
Start: 2020-10-16 | End: 2020-10-18

## 2020-10-16 RX ORDER — NITROGLYCERIN 20 MG/100ML
INJECTION INTRAVENOUS CONTINUOUS PRN
Status: DISCONTINUED | OUTPATIENT
Start: 2020-10-16 | End: 2020-10-16 | Stop reason: SDUPTHER

## 2020-10-16 RX ORDER — HYDROCODONE BITARTRATE AND ACETAMINOPHEN 5; 325 MG/1; MG/1
1 TABLET ORAL PRN
Status: DISCONTINUED | OUTPATIENT
Start: 2020-10-16 | End: 2020-10-16 | Stop reason: HOSPADM

## 2020-10-16 RX ORDER — AMLODIPINE BESYLATE 10 MG/1
10 TABLET ORAL 2 TIMES DAILY
Status: DISCONTINUED | OUTPATIENT
Start: 2020-10-16 | End: 2020-10-18

## 2020-10-16 RX ORDER — ONDANSETRON 2 MG/ML
4 INJECTION INTRAMUSCULAR; INTRAVENOUS
Status: DISCONTINUED | OUTPATIENT
Start: 2020-10-16 | End: 2020-10-16 | Stop reason: HOSPADM

## 2020-10-16 RX ORDER — POTASSIUM CHLORIDE 29.8 MG/ML
20 INJECTION INTRAVENOUS PRN
Status: DISCONTINUED | OUTPATIENT
Start: 2020-10-16 | End: 2020-10-20

## 2020-10-16 RX ORDER — PROTAMINE SULFATE 10 MG/ML
INJECTION, SOLUTION INTRAVENOUS PRN
Status: DISCONTINUED | OUTPATIENT
Start: 2020-10-16 | End: 2020-10-16 | Stop reason: SDUPTHER

## 2020-10-16 RX ORDER — DEXAMETHASONE SODIUM PHOSPHATE 4 MG/ML
INJECTION, SOLUTION INTRA-ARTICULAR; INTRALESIONAL; INTRAMUSCULAR; INTRAVENOUS; SOFT TISSUE PRN
Status: DISCONTINUED | OUTPATIENT
Start: 2020-10-16 | End: 2020-10-16 | Stop reason: SDUPTHER

## 2020-10-16 RX ORDER — PROPOFOL 10 MG/ML
INJECTION, EMULSION INTRAVENOUS
Status: COMPLETED
Start: 2020-10-16 | End: 2020-10-16

## 2020-10-16 RX ORDER — SODIUM CHLORIDE 0.9 % (FLUSH) 0.9 %
10 SYRINGE (ML) INJECTION PRN
Status: DISCONTINUED | OUTPATIENT
Start: 2020-10-16 | End: 2020-10-16 | Stop reason: SDUPTHER

## 2020-10-16 RX ORDER — ONDANSETRON 2 MG/ML
4 INJECTION INTRAMUSCULAR; INTRAVENOUS EVERY 6 HOURS PRN
Status: DISCONTINUED | OUTPATIENT
Start: 2020-10-16 | End: 2020-10-23 | Stop reason: HOSPADM

## 2020-10-16 RX ORDER — 0.9 % SODIUM CHLORIDE 0.9 %
1000 INTRAVENOUS SOLUTION INTRAVENOUS ONCE
Status: COMPLETED | OUTPATIENT
Start: 2020-10-16 | End: 2020-10-16

## 2020-10-16 RX ADMIN — SUGAMMADEX 200 MG: 100 INJECTION, SOLUTION INTRAVENOUS at 16:04

## 2020-10-16 RX ADMIN — DEXAMETHASONE SODIUM PHOSPHATE 4 MG: 4 INJECTION, SOLUTION INTRAMUSCULAR; INTRAVENOUS at 13:01

## 2020-10-16 RX ADMIN — NITROGLYCERIN 25 MCG/MIN: 20 INJECTION INTRAVENOUS at 14:17

## 2020-10-16 RX ADMIN — PROTAMINE SULFATE 10 MG: 10 INJECTION, SOLUTION INTRAVENOUS at 15:36

## 2020-10-16 RX ADMIN — Medication 125 MCG/HR: at 21:46

## 2020-10-16 RX ADMIN — FAMOTIDINE 20 MG: 10 INJECTION INTRAVENOUS at 20:10

## 2020-10-16 RX ADMIN — Medication 100 MCG: at 14:02

## 2020-10-16 RX ADMIN — SODIUM CHLORIDE: 9 INJECTION, SOLUTION INTRAVENOUS at 18:49

## 2020-10-16 RX ADMIN — PROPOFOL 60 MCG/KG/MIN: 10 INJECTION, EMULSION INTRAVENOUS at 22:47

## 2020-10-16 RX ADMIN — PROPOFOL 10 MCG/KG/MIN: 10 INJECTION, EMULSION INTRAVENOUS at 16:45

## 2020-10-16 RX ADMIN — INSULIN LISPRO 2 UNITS: 100 INJECTION, SOLUTION INTRAVENOUS; SUBCUTANEOUS at 21:48

## 2020-10-16 RX ADMIN — INSULIN LISPRO 2 UNITS: 100 INJECTION, SOLUTION INTRAVENOUS; SUBCUTANEOUS at 19:29

## 2020-10-16 RX ADMIN — NITROGLYCERIN 20 MCG: 20 INJECTION INTRAVENOUS at 15:44

## 2020-10-16 RX ADMIN — Medication 100 MCG: at 15:32

## 2020-10-16 RX ADMIN — SODIUM CHLORIDE 1000 ML: 9 INJECTION, SOLUTION INTRAVENOUS at 08:43

## 2020-10-16 RX ADMIN — Medication 100 MCG: at 13:33

## 2020-10-16 RX ADMIN — Medication 25 MEQ: at 15:08

## 2020-10-16 RX ADMIN — ALBUMIN (HUMAN) 250 ML: 12.5 INJECTION, SOLUTION INTRAVENOUS at 15:27

## 2020-10-16 RX ADMIN — PROPOFOL 150 MG: 10 INJECTION, EMULSION INTRAVENOUS at 12:41

## 2020-10-16 RX ADMIN — MIDAZOLAM 2 MG: 1 INJECTION INTRAMUSCULAR; INTRAVENOUS at 12:09

## 2020-10-16 RX ADMIN — FENTANYL CITRATE 50 MCG: 50 INJECTION, SOLUTION INTRAMUSCULAR; INTRAVENOUS at 15:27

## 2020-10-16 RX ADMIN — MIDAZOLAM 2 MG: 1 INJECTION INTRAMUSCULAR; INTRAVENOUS at 12:33

## 2020-10-16 RX ADMIN — ONDANSETRON 2 MG: 2 INJECTION INTRAMUSCULAR; INTRAVENOUS at 11:12

## 2020-10-16 RX ADMIN — Medication 100 MCG: at 16:29

## 2020-10-16 RX ADMIN — FENTANYL CITRATE 50 MCG: 50 INJECTION, SOLUTION INTRAMUSCULAR; INTRAVENOUS at 16:33

## 2020-10-16 RX ADMIN — Medication 100 MCG: at 13:29

## 2020-10-16 RX ADMIN — LIDOCAINE HYDROCHLORIDE 5 ML: 20 INJECTION, SOLUTION EPIDURAL; INFILTRATION; INTRACAUDAL; PERINEURAL at 12:39

## 2020-10-16 RX ADMIN — Medication 100 MCG: at 13:37

## 2020-10-16 RX ADMIN — SODIUM CHLORIDE: 9 INJECTION, SOLUTION INTRAVENOUS at 12:35

## 2020-10-16 RX ADMIN — CEFAZOLIN SODIUM 2 G: 10 INJECTION, POWDER, FOR SOLUTION INTRAVENOUS at 20:10

## 2020-10-16 RX ADMIN — MIDAZOLAM 2 MG: 1 INJECTION INTRAMUSCULAR; INTRAVENOUS at 10:58

## 2020-10-16 RX ADMIN — Medication 25 MEQ: at 15:29

## 2020-10-16 RX ADMIN — Medication 100 MCG: at 13:06

## 2020-10-16 RX ADMIN — Medication 100 MCG: at 15:36

## 2020-10-16 RX ADMIN — ROCURONIUM BROMIDE 50 MG: 10 INJECTION INTRAVENOUS at 12:41

## 2020-10-16 RX ADMIN — CALCIUM GLUCONATE 1 G: 20 INJECTION, SOLUTION INTRAVENOUS at 18:58

## 2020-10-16 RX ADMIN — Medication 25 MEQ: at 14:53

## 2020-10-16 RX ADMIN — PROTAMINE SULFATE 15 MG: 10 INJECTION, SOLUTION INTRAVENOUS at 15:40

## 2020-10-16 RX ADMIN — Medication 50 MEQ: at 15:19

## 2020-10-16 RX ADMIN — PHENYLEPHRINE HYDROCHLORIDE 50 MCG/MIN: 10 INJECTION INTRAVENOUS at 23:57

## 2020-10-16 RX ADMIN — SODIUM CHLORIDE: 9 INJECTION, SOLUTION INTRAVENOUS at 12:51

## 2020-10-16 RX ADMIN — PROPOFOL 60 MCG/KG/MIN: 10 INJECTION, EMULSION INTRAVENOUS at 20:10

## 2020-10-16 RX ADMIN — FENTANYL CITRATE 50 MCG: 50 INJECTION, SOLUTION INTRAMUSCULAR; INTRAVENOUS at 11:12

## 2020-10-16 RX ADMIN — FENTANYL CITRATE 50 MCG: 50 INJECTION, SOLUTION INTRAMUSCULAR; INTRAVENOUS at 12:50

## 2020-10-16 RX ADMIN — FENTANYL CITRATE 150 MCG: 50 INJECTION, SOLUTION INTRAMUSCULAR; INTRAVENOUS at 12:40

## 2020-10-16 RX ADMIN — Medication 25 MEQ: at 15:23

## 2020-10-16 RX ADMIN — Medication 100 MCG: at 12:59

## 2020-10-16 RX ADMIN — SODIUM CHLORIDE, PRESERVATIVE FREE 10 ML: 5 INJECTION INTRAVENOUS at 20:20

## 2020-10-16 RX ADMIN — HEPARIN SODIUM 3000 UNITS: 1000 INJECTION INTRAVENOUS; SUBCUTANEOUS at 14:09

## 2020-10-16 RX ADMIN — PHENYLEPHRINE HYDROCHLORIDE 25 MCG/MIN: 10 INJECTION INTRAVENOUS at 14:44

## 2020-10-16 RX ADMIN — ROCURONIUM BROMIDE 50 MG: 10 INJECTION INTRAVENOUS at 13:55

## 2020-10-16 RX ADMIN — AMLODIPINE BESYLATE 10 MG: 10 TABLET ORAL at 20:10

## 2020-10-16 RX ADMIN — SODIUM CHLORIDE: 9 INJECTION, SOLUTION INTRAVENOUS at 14:47

## 2020-10-16 RX ADMIN — SODIUM BICARBONATE 50 MEQ: 84 INJECTION, SOLUTION INTRAVENOUS at 18:58

## 2020-10-16 RX ADMIN — SODIUM CHLORIDE 500 ML: 9 INJECTION, SOLUTION INTRAVENOUS at 09:47

## 2020-10-16 RX ADMIN — CEFAZOLIN SODIUM 2 G: 10 INJECTION, POWDER, FOR SOLUTION INTRAVENOUS at 12:33

## 2020-10-16 RX ADMIN — PRAVASTATIN SODIUM 40 MG: 40 TABLET ORAL at 20:10

## 2020-10-16 RX ADMIN — HEPARIN SODIUM 1000 UNITS: 1000 INJECTION INTRAVENOUS; SUBCUTANEOUS at 14:26

## 2020-10-16 RX ADMIN — ALBUMIN (HUMAN) 250 ML: 12.5 INJECTION, SOLUTION INTRAVENOUS at 14:19

## 2020-10-16 RX ADMIN — FENTANYL CITRATE 100 MCG: 50 INJECTION, SOLUTION INTRAMUSCULAR; INTRAVENOUS at 13:07

## 2020-10-16 RX ADMIN — CHLORHEXIDINE GLUCONATE 15 ML: 1.2 RINSE ORAL at 20:11

## 2020-10-16 RX ADMIN — Medication 100 MCG: at 14:07

## 2020-10-16 RX ADMIN — Medication 25 MCG/HR: at 17:21

## 2020-10-16 RX ADMIN — Medication 100 MCG: at 14:59

## 2020-10-16 ASSESSMENT — PULMONARY FUNCTION TESTS
PIF_VALUE: 25
PIF_VALUE: 19
PIF_VALUE: 18
PIF_VALUE: 23
PIF_VALUE: 23
PIF_VALUE: 24
PIF_VALUE: 22
PIF_VALUE: 23
PIF_VALUE: 24
PIF_VALUE: 17
PIF_VALUE: 19
PIF_VALUE: 24
PIF_VALUE: 27
PIF_VALUE: 22
PIF_VALUE: 25
PIF_VALUE: 23
PIF_VALUE: 24
PIF_VALUE: 21
PIF_VALUE: 24
PIF_VALUE: 23
PIF_VALUE: 23
PIF_VALUE: 27
PIF_VALUE: 0
PIF_VALUE: 20
PIF_VALUE: 18
PIF_VALUE: 23
PIF_VALUE: 26
PIF_VALUE: 25
PIF_VALUE: 23
PIF_VALUE: 18
PIF_VALUE: 18
PIF_VALUE: 23
PIF_VALUE: 26
PIF_VALUE: 19
PIF_VALUE: 18
PIF_VALUE: 23
PIF_VALUE: 25
PIF_VALUE: 26
PIF_VALUE: 23
PIF_VALUE: 24
PIF_VALUE: 24
PIF_VALUE: 18
PIF_VALUE: 23
PIF_VALUE: 24
PIF_VALUE: 23
PIF_VALUE: 21
PIF_VALUE: 0
PIF_VALUE: 24
PIF_VALUE: 23
PIF_VALUE: 25
PIF_VALUE: 23
PIF_VALUE: 24
PIF_VALUE: 22
PIF_VALUE: 23
PIF_VALUE: 5
PIF_VALUE: 21
PIF_VALUE: 26
PIF_VALUE: 24
PIF_VALUE: 23
PIF_VALUE: 18
PIF_VALUE: 24
PIF_VALUE: 23
PIF_VALUE: 24
PIF_VALUE: 10
PIF_VALUE: 22
PIF_VALUE: 24
PIF_VALUE: 18
PIF_VALUE: 25
PIF_VALUE: 24
PIF_VALUE: 23
PIF_VALUE: 27
PIF_VALUE: 18
PIF_VALUE: 24
PIF_VALUE: 19
PIF_VALUE: 23
PIF_VALUE: 22
PIF_VALUE: 22
PIF_VALUE: 23
PIF_VALUE: 23
PIF_VALUE: 9
PIF_VALUE: 17
PIF_VALUE: 19
PIF_VALUE: 24
PIF_VALUE: 2
PIF_VALUE: 19
PIF_VALUE: 23
PIF_VALUE: 24
PIF_VALUE: 25
PIF_VALUE: 24
PIF_VALUE: 20
PIF_VALUE: 23
PIF_VALUE: 17
PIF_VALUE: 21
PIF_VALUE: 21
PIF_VALUE: 23
PIF_VALUE: 24
PIF_VALUE: 20
PIF_VALUE: 24
PIF_VALUE: 24
PIF_VALUE: 20
PIF_VALUE: 25
PIF_VALUE: 23
PIF_VALUE: 24
PIF_VALUE: 23
PIF_VALUE: 23
PIF_VALUE: 26
PIF_VALUE: 23
PIF_VALUE: 19
PIF_VALUE: 21
PIF_VALUE: 24
PIF_VALUE: 25
PIF_VALUE: 18
PIF_VALUE: 23
PIF_VALUE: 19
PIF_VALUE: 20
PIF_VALUE: 22
PIF_VALUE: 23
PIF_VALUE: 20
PIF_VALUE: 24
PIF_VALUE: 20
PIF_VALUE: 18
PIF_VALUE: 1
PIF_VALUE: 26
PIF_VALUE: 23
PIF_VALUE: 24
PIF_VALUE: 22
PIF_VALUE: 0
PIF_VALUE: 25
PIF_VALUE: 26
PIF_VALUE: 23
PIF_VALUE: 24
PIF_VALUE: 21
PIF_VALUE: 23
PIF_VALUE: 25
PIF_VALUE: 23
PIF_VALUE: 21
PIF_VALUE: 23
PIF_VALUE: 0
PIF_VALUE: 24
PIF_VALUE: 0
PIF_VALUE: 25
PIF_VALUE: 25
PIF_VALUE: 20
PIF_VALUE: 19
PIF_VALUE: 23
PIF_VALUE: 26
PIF_VALUE: 25
PIF_VALUE: 23
PIF_VALUE: 26
PIF_VALUE: 24
PIF_VALUE: 23
PIF_VALUE: 22
PIF_VALUE: 23
PIF_VALUE: 24
PIF_VALUE: 21
PIF_VALUE: 22
PIF_VALUE: 24
PIF_VALUE: 0
PIF_VALUE: 18
PIF_VALUE: 24
PIF_VALUE: 19
PIF_VALUE: 20
PIF_VALUE: 23
PIF_VALUE: 24
PIF_VALUE: 25
PIF_VALUE: 23
PIF_VALUE: 5
PIF_VALUE: 28
PIF_VALUE: 11
PIF_VALUE: 19
PIF_VALUE: 23
PIF_VALUE: 23
PIF_VALUE: 21
PIF_VALUE: 18
PIF_VALUE: 23
PIF_VALUE: 22
PIF_VALUE: 23
PIF_VALUE: 40
PIF_VALUE: 10
PIF_VALUE: 19
PIF_VALUE: 21
PIF_VALUE: 20
PIF_VALUE: 18
PIF_VALUE: 0
PIF_VALUE: 26
PIF_VALUE: 17
PIF_VALUE: 25
PIF_VALUE: 25
PIF_VALUE: 24
PIF_VALUE: 25
PIF_VALUE: 25
PIF_VALUE: 21
PIF_VALUE: 26
PIF_VALUE: 26
PIF_VALUE: 25
PIF_VALUE: 19
PIF_VALUE: 23
PIF_VALUE: 22
PIF_VALUE: 2
PIF_VALUE: 25
PIF_VALUE: 20
PIF_VALUE: 23
PIF_VALUE: 24
PIF_VALUE: 20
PIF_VALUE: 28
PIF_VALUE: 25
PIF_VALUE: 22
PIF_VALUE: 22
PIF_VALUE: 21
PIF_VALUE: 22
PIF_VALUE: 23
PIF_VALUE: 22
PIF_VALUE: 1
PIF_VALUE: 26
PIF_VALUE: 18
PIF_VALUE: 23
PIF_VALUE: 0
PIF_VALUE: 23
PIF_VALUE: 23
PIF_VALUE: 25
PIF_VALUE: 25
PIF_VALUE: 22
PIF_VALUE: 1
PIF_VALUE: 22
PIF_VALUE: 20
PIF_VALUE: 21
PIF_VALUE: 22
PIF_VALUE: 17
PIF_VALUE: 25
PIF_VALUE: 26
PIF_VALUE: 24
PIF_VALUE: 24
PIF_VALUE: 26
PIF_VALUE: 20
PIF_VALUE: 12
PIF_VALUE: 23
PIF_VALUE: 18
PIF_VALUE: 25
PIF_VALUE: 22
PIF_VALUE: 0
PIF_VALUE: 27
PIF_VALUE: 0
PIF_VALUE: 18
PIF_VALUE: 23
PIF_VALUE: 19
PIF_VALUE: 24
PIF_VALUE: 25
PIF_VALUE: 20
PIF_VALUE: 23
PIF_VALUE: 19
PIF_VALUE: 22
PIF_VALUE: 19
PIF_VALUE: 22

## 2020-10-16 ASSESSMENT — PAIN SCALES - GENERAL: PAINLEVEL_OUTOF10: 5

## 2020-10-16 ASSESSMENT — LIFESTYLE VARIABLES: SMOKING_STATUS: 0

## 2020-10-16 ASSESSMENT — PAIN - FUNCTIONAL ASSESSMENT: PAIN_FUNCTIONAL_ASSESSMENT: 0-10

## 2020-10-16 NOTE — BRIEF OP NOTE
Brief Postoperative Note      Patient: Dee Gaines  YOB: 1950  MRN: [id number]    Date of Procedure: 10/16/2020    Pre-Op Diagnosis: ABDOMINAL AORTIC ANEURYSM    Post-Op Diagnosis: Same       Procedure(s):  OPEN REPAIR ABDOMINAL AORTIC ANEURYSM    Surgeon(s):  Rome Holder MD    Assistant:  Surgical Assistant: Megan Mendoza    Anesthesia: General    Estimated Blood Loss (mL): 964    Complications: None    Specimens:   * No specimens in log *    Implants:  Implant Name Type Inv.  Item Serial No.  Lot No. LRB No. Used Action   GRAFT VASC ALBO KNT POLY STR [id number] - [id number] Vascular/Graft/Patch/Filter GRAFT VASC ALBO KNT POLY STR [id number] [address] [place] [address] [id number] N/A 1 Implanted         Drains:   Urethral Catheter Double-lumen;Non-latex 16 fr (Active)       Findings: as above    Electronically signed by Rome Holder MD on 10/16/2020 at 4:49 PM

## 2020-10-16 NOTE — ANESTHESIA PROCEDURE NOTES
Arterial Line:    An arterial line was placed using surface landmarks, in the OR for the following indication(s): continuous blood pressure monitoring and blood sampling needed. A 20 gauge (size), 1 and 3/8 inch (length), Angiocath (type) catheter was placed, Seldinger technique not used, into the right radial artery, secured by tape. Anesthesia type: Local    Events:  patient tolerated procedure well with no complications and EBL < 5mL.   Anesthesiologist: Jarret Patricio MD  Preanesthetic Checklist  Completed: patient identified, site marked, surgical consent, pre-op evaluation, timeout performed, IV checked, risks and benefits discussed, monitors and equipment checked, anesthesia consent given, oxygen available and patient being monitored

## 2020-10-16 NOTE — ANESTHESIA PRE PROCEDURE
[facility] Department of Anesthesiology  Pre-Anesthesia Evaluation/Consultation       Name:  Micki Haney  : 1950  Age:  65 y.o.                                            MRN:  [id number]  Date: 10/16/2020           Surgeon: Surgeon(s):  Vladimir Gordillo MD    Procedure: Procedure(s):  OPEN REPAIR ABDOMINAL AORTIC ANEURYSM     Allergies   Allergen Reactions    Ciprocinonide [Fluocinolone]      Pt does not know     Patient Active Problem List   Diagnosis    Atherosclerosis of native artery of extremity with intermittent claudication ([address]    Critical lower limb ischemia    PAD (peripheral artery disease) ([address]    Tobacco use    Essential hypertension    Leg pain    Enlarging abdominal aortic aneurysm (AAA) ([address]    AAA (abdominal aortic aneurysm) ([address]     Past Medical History:   Diagnosis Date    Arthritis     osteoarthritis    Asthma     Back pain     CAD (coronary artery disease)     Cellulitis     COPD (chronic obstructive pulmonary disease) ([address]     pt unsure    Depression     Diabetes mellitus ([address]     Enlarging abdominal aortic aneurysm (AAA) ([address]     History of femoral angiogram 2015    Hyperlipidemia     Hypertension     PVD (peripheral vascular disease) ([address]      Past Surgical History:   Procedure Laterality Date    ABSCESS DRAINAGE Right 16    I and D right gluteal abscess    CARDIAC CATHETERIZATION      CHOLECYSTECTOMY      HERNIA REPAIR      OTHER SURGICAL HISTORY Left 2015    excisional debridement 3.5 cm x 5 cm abscess    VASCULAR SURGERY Left     fem-tib     Social History     Tobacco Use    Smoking status: Current Every Day Smoker     Packs/day: 0.50     Years: 45.00     Pack years: 22.50     Types: Cigarettes     Last attempt to quit: 10/26/2019     Years since quittin.9    Smokeless tobacco: Never Used    Tobacco comment: been cutting back with electronic cig's-pt states she is vaping- but not often   Substance Use Topics    SpO2:  94%   Weight: 200 lb (90.7 kg)    Height: 5' 5\" (1.651 m) 5' 5\" (1.651 m)                                          BP Readings from Last 3 Encounters:   10/16/20 (!) 157/77   20 (!) 167/82   20 (!) 156/91     Vital Signs Statistics (for past 48 hrs)     Temp  Av.5 °F (36.9 °C)  Min: 98.5 °F (36.9 °C)   Min taken time: 10/16/20 0816  Max: 98.5 °F (36.9 °C)   Max taken time: 10/16/20 0816  Pulse  Av  Min: 80   Min taken time: 10/16/20 0816  Max: 80   Max taken time: 10/16/20 0816  Resp  Av  Min: 25   Min taken time: 10/16/20 0816  Max: 18   Max taken time: 10/16/20 0816  BP  Min: 157/77   Min taken time: 10/16/20 0816  Max: 157/77   Max taken time: 10/16/20 0816  SpO2  Av %  Min: 94 %   Min taken time: 10/16/20 0816  Max: 94 %   Max taken time: 10/16/20 0816  BP Readings from Last 3 Encounters:   10/16/20 (!) 157/77   20 (!) 167/82   20 (!) 156/91       BMI  Body mass index is 33.28 kg/m². Estimated body mass index is 33.28 kg/m² as calculated from the following:    Height as of this encounter: 5' 5\" (1.651 m). Weight as of this encounter: 200 lb (90.7 kg).     CBC   Lab Results   Component Value Date    WBC 6.3 10/12/2020    RBC 4.83 10/12/2020    HGB 14.4 10/12/2020    HCT 43.6 10/12/2020    MCV 90.1 10/12/2020    RDW 15.2 10/12/2020     10/12/2020     CMP    Lab Results   Component Value Date     10/12/2020    K 4.4 10/12/2020    K 4.0 2020     10/12/2020    CO2 28 10/12/2020    BUN 8 10/12/2020    CREATININE 1.0 10/12/2020    GFRAA >60 10/12/2020    GFRAA >60 03/15/2011    AGRATIO 1.4 2020    LABGLOM 54 10/12/2020    GLUCOSE 126 10/12/2020    PROT 6.6 2020    PROT 6.5 03/15/2011    CALCIUM 9.9 10/12/2020    BILITOT 0.8 2020    ALKPHOS 86 2020    AST 12 2020    ALT 12 2020     BMP    Lab Results   Component Value Date     10/12/2020    K 4.4 10/12/2020    K 4.0 2020     10/12/2020 blood products discussed with patient whom consented to blood products. Plan discussed with CRNA. This pre-anesthesia assessment may be used as a history and physical.    DOS STAFF ADDENDUM:    Pt seen and examined, chart reviewed (including anesthesia, drug and allergy history). No interval changes to history and physical examination. Anesthetic plan, risks, benefits, alternatives, and personnel involved discussed with patient. Patient verbalized an understanding and agrees to proceed.       Ernesto Romero MD  October 16, 2020  8:40 AM

## 2020-10-16 NOTE — ANESTHESIA PROCEDURE NOTES
Central Venous Line:    A central venous line was placed using surface landmarks, in the OR for the following indication(s): central venous access and CVP monitoring. Sterility preparation included the following: hand hygiene performed prior to procedure, maximum sterile barriers used and sterile technique used to drape from head to toe. The patient was placed in Trendelenburg position. The    The site was prepped with Chloraprep. A 9 Fr (size), introducer double lumen was placed. During the procedure, the following specific steps were taken: target vein identified, needle advanced into vein and blood aspirated and guidewire advanced into vein. Post insertion care included: all ports aspirated, all ports flushed easily, guidewire removed intact, Biopatch applied, line sutured in place and dressing applied. During the procedure the patient experienced: patient tolerated procedure well with no complications and EBL < 5mL.       Anesthesia type: general..No  Preanesthetic Checklist  Completed: patient identified, timeout performed and patient being monitored

## 2020-10-16 NOTE — H&P
Subjective:       Meghna Camp is a 65 y.o. female with enlarging AAA not suitable for stent grafting. Past Medical History:   Diagnosis Date    Arthritis     osteoarthritis    Asthma     Back pain     CAD (coronary artery disease)     Cellulitis     COPD (chronic obstructive pulmonary disease) (HCC)     pt unsure    Depression     Diabetes mellitus ([address]     Enlarging abdominal aortic aneurysm (AAA) (HCC)     History of femoral angiogram 2015    Hyperlipidemia     Hypertension     PVD [facility]peripheral vascular disease) ([facility])      Past Surgical History:   Procedure Laterality Date    ABSCESS DRAINAGE Right 16    I and D right gluteal abscess    CARDIAC CATHETERIZATION      CHOLECYSTECTOMY      HERNIA REPAIR      OTHER SURGICAL HISTORY Left 2015    excisional debridement 3.5 cm x 5 cm abscess    VASCULAR SURGERY Left     fem-tib     Family History   Problem Relation Age of Onset    Heart Surgery Father      Social History     Socioeconomic History    Marital status:      Spouse name: None    Number of children: None    Years of education: None    Highest education level: None   Occupational History    None   Social Needs    Financial resource strain: None    Food insecurity     Worry: None     Inability: None    Transportation needs     Medical: None     Non-medical: None   Tobacco Use    Smoking status: Current Every Day Smoker     Packs/day: 0.50     Years: 45.00     Pack years: 22.50     Types: Cigarettes     Last attempt to quit: 10/26/2019     Years since quittin.9    Smokeless tobacco: Never Used    Tobacco comment: been cutting back with electronic cig's-pt states she is vaping- but not often   Substance and Sexual Activity    Alcohol use:  Yes     Alcohol/week: 3.8 standard drinks     Types: 3 Cans of beer, 1 Standard drinks or equivalent per week    Drug use: No    Sexual activity: Not Currently   Lifestyle    Physical activity     Days per week: intact. Fundi benign. Mouth: MMM no lesions   Lymph Nodes:  Cervical, supraclavicular, and axillary nodes normal.   Lungs:  clear to auscultation bilaterally   Heart:  regular rate and rhythm, S1, S2 normal, no murmur, click, rub or gallop   Abdomen: soft, non-tender; bowel sounds normal; no masses,  no organomegaly   CVA:  absent   Genitourinary: defer exam   Extremities:  extremities normal, atraumatic, no cyanosis or edema   Neurologic:  negative   Psychiatric:  non focal     Pulses:   R bruit   L bruit   2    carotid 2     2    brachial 2     2    radial 2     2 0  femoral 2     0    popliteal 0     0    posterior tibial 1     0    dorsalis pedis 2     na    bypass graft na          Assessment:       AAA       Plan:     Open repair with suprarenal clamp for control. Pt understands and willing to proceed.     Susy Lucero

## 2020-10-16 NOTE — PROGRESS NOTES
Comprehensive Nutrition Assessment    Type and Reason for Visit:  Initial(vent)    Nutrition Recommendations/Plan:   Start nutrition when appropriate    Nutrition Assessment:  Pt with pmh of CAD, COPD, DM, HLD, HTN, AAA. Adm for elective surgery to repair AAA. Pt currently intubated. Pressor support off but Propofol running at 21.8 ml per hour, adding 576 kcals per day. Will monior for start of nutrition. Malnutrition Assessment:  Malnutrition Status:  Insufficient data    Context:  Acute Illness       Estimated Daily Nutrient Needs:  Energy (kcal):  4573-4202 (15-18 x ABW 91 kg); Weight Used for Energy Requirements:        Protein (g):   (1.2-2 x ABW); Weight Used for Protein Requirements:           Fluid (ml/day):  1 ml per kcal; Weight Used for Fluid Requirements:         Nutrition Related Findings:         Wounds:  Surgical Wound(abd)       Current Nutrition Therapies:    Diet NPO Effective Now Exceptions are: Sips with Meds    Anthropometric Measures:  · Height: 5' 5\" (165.1 cm)  · Current Body Weight: 200 lb (90.7 kg)   · Ideal Body Weight: 125 lbs; % Ideal Body Weight 160 %   · BMI: 33.3  · Adjusted Body Weight:  ; No Adjustment   · BMI Categories: Obese Class 1 (BMI 30.0-34. 9)       Nutrition Diagnosis:   · Inadequate oral intake related to impaired respiratory function as evidenced by intubation      Nutrition Interventions:   Food and/or Nutrient Delivery:  Continue NPO(start nutrition when appropriate)  Nutrition Education/Counseling:  No recommendation at this time   Coordination of Nutrition Care:  Continued Inpatient Monitoring    Goals:  tolerate most appropriate form of nutrition       Nutrition Monitoring and Evaluation:   Food/Nutrient Intake Outcomes:  (nutrition advancement)  Physical Signs/Symptoms Outcomes:  Biochemical Data, Constipation, Diarrhea, Fluid Status or Edema, Weight, Skin, Nutrition Focused Physical Findings, Hemodynamic Status     Discharge Planning:     Too soon to determine     Electronically signed by Ruby Lindquist RD, LD on 10/16/20 at 6:52 PM EDT    Contact: [phone]

## 2020-10-16 NOTE — PROGRESS NOTES
Results for Karoline Ramirez (MRN [id number]) as of 10/16/2020 18:59   Ref.  Range 10/16/2020 18:07   pH, Arterial Latest Ref Range: 7.350 - 7.450  7.292 (L)   pCO2, Arterial Latest Ref Range: 35.0 - 45.0 mmHg 50.6 (H)   pO2, Arterial Latest Ref Range: 75.0 - 108.0 mmHg 89.0   HCO3, Arterial Latest Ref Range: 21.0 - 29.0 mmol/L 24.4     Increased rate from 10 to 12 and Vt from 500 to 550

## 2020-10-17 ENCOUNTER — APPOINTMENT (OUTPATIENT)
Dept: GENERAL RADIOLOGY | Age: 70
DRG: 268 | End: 2020-10-17
Attending: SURGERY
Payer: MEDICARE

## 2020-10-17 LAB
AMYLASE: 92 U/L (ref 25–115)
ANION GAP SERPL CALCULATED.3IONS-SCNC: 10 MMOL/L (ref 3–16)
APTT: 26.9 SEC (ref 24.2–36.2)
BASE EXCESS ARTERIAL: -0.8 MMOL/L (ref -3–3)
BASE EXCESS ARTERIAL: -1.7 MMOL/L (ref -3–3)
BASE EXCESS ARTERIAL: 0 (ref -3–3)
BUN BLDV-MCNC: 16 MG/DL (ref 7–20)
CALCIUM SERPL-MCNC: 7.2 MG/DL (ref 8.3–10.6)
CARBOXYHEMOGLOBIN ARTERIAL: 0.7 % (ref 0–1.5)
CARBOXYHEMOGLOBIN ARTERIAL: 1.1 % (ref 0–1.5)
CHLORIDE BLD-SCNC: 109 MMOL/L (ref 99–110)
CO2: 24 MMOL/L (ref 21–32)
CREAT SERPL-MCNC: 1.1 MG/DL (ref 0.6–1.2)
GFR AFRICAN AMERICAN: 59
GFR NON-AFRICAN AMERICAN: 49
GLUCOSE BLD-MCNC: 109 MG/DL (ref 70–99)
GLUCOSE BLD-MCNC: 110 MG/DL (ref 70–99)
GLUCOSE BLD-MCNC: 115 MG/DL (ref 70–99)
GLUCOSE BLD-MCNC: 122 MG/DL (ref 70–99)
GLUCOSE BLD-MCNC: 127 MG/DL (ref 70–99)
GLUCOSE BLD-MCNC: 128 MG/DL (ref 70–99)
GLUCOSE BLD-MCNC: 134 MG/DL (ref 70–99)
HCO3 ARTERIAL: 25 MMOL/L (ref 21–29)
HCO3 ARTERIAL: 25.9 MMOL/L (ref 21–29)
HCO3 ARTERIAL: 26 MMOL/L (ref 21–29)
HCT VFR BLD CALC: 38 % (ref 36–48)
HEMOGLOBIN, ART, EXTENDED: 12.9 G/DL (ref 12–16)
HEMOGLOBIN, ART, EXTENDED: 13.1 G/DL (ref 12–16)
HEMOGLOBIN: 12.5 G/DL (ref 12–16)
INR BLD: 1.09 (ref 0.86–1.14)
LIPASE: 99 U/L (ref 13–60)
MCH RBC QN AUTO: 29.6 PG (ref 26–34)
MCHC RBC AUTO-ENTMCNC: 32.7 G/DL (ref 31–36)
MCV RBC AUTO: 90.4 FL (ref 80–100)
METHEMOGLOBIN ARTERIAL: 0.5 %
METHEMOGLOBIN ARTERIAL: 0.7 %
O2 CONTENT ARTERIAL: 18 ML/DL
O2 CONTENT ARTERIAL: 18 ML/DL
O2 SAT, ARTERIAL: 94 % (ref 93–100)
O2 SAT, ARTERIAL: 98.1 %
O2 SAT, ARTERIAL: 99.5 %
O2 THERAPY: ABNORMAL
O2 THERAPY: ABNORMAL
PCO2 ARTERIAL: 49.5 MMHG (ref 35–45)
PCO2 ARTERIAL: 49.7 MM HG (ref 35–45)
PCO2 ARTERIAL: 50.3 MMHG (ref 35–45)
PDW BLD-RTO: 15.1 % (ref 12.4–15.4)
PERFORMED ON: ABNORMAL
PH ARTERIAL: 7.31 (ref 7.35–7.45)
PH ARTERIAL: 7.32 (ref 7.35–7.45)
PH ARTERIAL: 7.33 (ref 7.35–7.45)
PLATELET # BLD: 221 K/UL (ref 135–450)
PMV BLD AUTO: 8.1 FL (ref 5–10.5)
PO2 ARTERIAL: 109 MMHG (ref 75–108)
PO2 ARTERIAL: 133 MMHG (ref 75–108)
PO2 ARTERIAL: 78.7 MM HG (ref 75–108)
POC SAMPLE TYPE: ABNORMAL
POTASSIUM SERPL-SCNC: 3.7 MMOL/L (ref 3.5–5.1)
PROTHROMBIN TIME: 12.6 SEC (ref 10–13.2)
RBC # BLD: 4.21 M/UL (ref 4–5.2)
SODIUM BLD-SCNC: 143 MMOL/L (ref 136–145)
TCO2 ARTERIAL: 26.5 MMOL/L
TCO2 ARTERIAL: 27.5 MMOL/L
TCO2 ARTERIAL: 28 MMOL/L
WBC # BLD: 13.4 K/UL (ref 4–11)

## 2020-10-17 PROCEDURE — 85027 COMPLETE CBC AUTOMATED: CPT

## 2020-10-17 PROCEDURE — 82803 BLOOD GASES ANY COMBINATION: CPT

## 2020-10-17 PROCEDURE — 71045 X-RAY EXAM CHEST 1 VIEW: CPT

## 2020-10-17 PROCEDURE — 6360000002 HC RX W HCPCS: Performed by: SURGERY

## 2020-10-17 PROCEDURE — 85610 PROTHROMBIN TIME: CPT

## 2020-10-17 PROCEDURE — 83690 ASSAY OF LIPASE: CPT

## 2020-10-17 PROCEDURE — 2700000000 HC OXYGEN THERAPY PER DAY

## 2020-10-17 PROCEDURE — 85730 THROMBOPLASTIN TIME PARTIAL: CPT

## 2020-10-17 PROCEDURE — 99024 POSTOP FOLLOW-UP VISIT: CPT | Performed by: SURGERY

## 2020-10-17 PROCEDURE — 94761 N-INVAS EAR/PLS OXIMETRY MLT: CPT

## 2020-10-17 PROCEDURE — 2100000000 HC CCU R&B

## 2020-10-17 PROCEDURE — 6370000000 HC RX 637 (ALT 250 FOR IP): Performed by: SURGERY

## 2020-10-17 PROCEDURE — 82150 ASSAY OF AMYLASE: CPT

## 2020-10-17 PROCEDURE — 80048 BASIC METABOLIC PNL TOTAL CA: CPT

## 2020-10-17 PROCEDURE — 2500000003 HC RX 250 WO HCPCS: Performed by: SURGERY

## 2020-10-17 PROCEDURE — 2580000003 HC RX 258: Performed by: SURGERY

## 2020-10-17 RX ORDER — CALCIUM GLUCONATE 20 MG/ML
1 INJECTION, SOLUTION INTRAVENOUS ONCE
Status: COMPLETED | OUTPATIENT
Start: 2020-10-17 | End: 2020-10-17

## 2020-10-17 RX ORDER — FENTANYL CITRATE 50 UG/ML
25 INJECTION, SOLUTION INTRAMUSCULAR; INTRAVENOUS
Status: DISCONTINUED | OUTPATIENT
Start: 2020-10-17 | End: 2020-10-17

## 2020-10-17 RX ORDER — DEXTROSE MONOHYDRATE 25 G/50ML
12.5 INJECTION, SOLUTION INTRAVENOUS PRN
Status: DISCONTINUED | OUTPATIENT
Start: 2020-10-17 | End: 2020-10-21

## 2020-10-17 RX ORDER — MORPHINE SULFATE 2 MG/ML
2 INJECTION, SOLUTION INTRAMUSCULAR; INTRAVENOUS
Status: DISCONTINUED | OUTPATIENT
Start: 2020-10-17 | End: 2020-10-21

## 2020-10-17 RX ORDER — DEXTROSE MONOHYDRATE 50 MG/ML
100 INJECTION, SOLUTION INTRAVENOUS PRN
Status: DISCONTINUED | OUTPATIENT
Start: 2020-10-17 | End: 2020-10-21

## 2020-10-17 RX ORDER — NICOTINE POLACRILEX 4 MG
15 LOZENGE BUCCAL PRN
Status: DISCONTINUED | OUTPATIENT
Start: 2020-10-17 | End: 2020-10-21

## 2020-10-17 RX ORDER — HYDRALAZINE HYDROCHLORIDE 20 MG/ML
10 INJECTION INTRAMUSCULAR; INTRAVENOUS EVERY 6 HOURS PRN
Status: DISCONTINUED | OUTPATIENT
Start: 2020-10-17 | End: 2020-10-23 | Stop reason: HOSPADM

## 2020-10-17 RX ORDER — MORPHINE SULFATE 4 MG/ML
4 INJECTION, SOLUTION INTRAMUSCULAR; INTRAVENOUS
Status: DISCONTINUED | OUTPATIENT
Start: 2020-10-17 | End: 2020-10-21

## 2020-10-17 RX ADMIN — FAMOTIDINE 20 MG: 10 INJECTION INTRAVENOUS at 08:50

## 2020-10-17 RX ADMIN — PROPOFOL 30 MCG/KG/MIN: 10 INJECTION, EMULSION INTRAVENOUS at 03:34

## 2020-10-17 RX ADMIN — MORPHINE SULFATE 4 MG: 4 INJECTION, SOLUTION INTRAMUSCULAR; INTRAVENOUS at 18:19

## 2020-10-17 RX ADMIN — MORPHINE SULFATE 2 MG: 2 INJECTION, SOLUTION INTRAMUSCULAR; INTRAVENOUS at 16:55

## 2020-10-17 RX ADMIN — PRAVASTATIN SODIUM 40 MG: 40 TABLET ORAL at 08:50

## 2020-10-17 RX ADMIN — Medication 125 MCG/HR: at 01:45

## 2020-10-17 RX ADMIN — FENTANYL CITRATE 25 MCG: 50 INJECTION, SOLUTION INTRAMUSCULAR; INTRAVENOUS at 10:22

## 2020-10-17 RX ADMIN — SODIUM CHLORIDE: 9 INJECTION, SOLUTION INTRAVENOUS at 09:31

## 2020-10-17 RX ADMIN — CALCIUM GLUCONATE 1 G: 20 INJECTION, SOLUTION INTRAVENOUS at 07:46

## 2020-10-17 RX ADMIN — FENTANYL CITRATE 25 MCG: 50 INJECTION, SOLUTION INTRAMUSCULAR; INTRAVENOUS at 12:38

## 2020-10-17 RX ADMIN — FENTANYL CITRATE 25 MCG: 50 INJECTION, SOLUTION INTRAMUSCULAR; INTRAVENOUS at 08:59

## 2020-10-17 RX ADMIN — AMLODIPINE BESYLATE 10 MG: 10 TABLET ORAL at 07:05

## 2020-10-17 RX ADMIN — CEFAZOLIN SODIUM 2 G: 10 INJECTION, POWDER, FOR SOLUTION INTRAVENOUS at 03:47

## 2020-10-17 RX ADMIN — FENTANYL CITRATE 25 MCG: 50 INJECTION, SOLUTION INTRAMUSCULAR; INTRAVENOUS at 07:51

## 2020-10-17 RX ADMIN — MORPHINE SULFATE 2 MG: 2 INJECTION, SOLUTION INTRAMUSCULAR; INTRAVENOUS at 15:27

## 2020-10-17 RX ADMIN — SODIUM CHLORIDE: 9 INJECTION, SOLUTION INTRAVENOUS at 14:59

## 2020-10-17 RX ADMIN — AMLODIPINE BESYLATE 10 MG: 10 TABLET ORAL at 20:30

## 2020-10-17 RX ADMIN — FAMOTIDINE 20 MG: 10 INJECTION INTRAVENOUS at 20:30

## 2020-10-17 RX ADMIN — SODIUM CHLORIDE, PRESERVATIVE FREE 10 ML: 5 INJECTION INTRAVENOUS at 20:30

## 2020-10-17 RX ADMIN — MORPHINE SULFATE 2 MG: 2 INJECTION, SOLUTION INTRAMUSCULAR; INTRAVENOUS at 22:07

## 2020-10-17 ASSESSMENT — PAIN SCALES - GENERAL
PAINLEVEL_OUTOF10: 8
PAINLEVEL_OUTOF10: 9
PAINLEVEL_OUTOF10: 6
PAINLEVEL_OUTOF10: 3
PAINLEVEL_OUTOF10: 7
PAINLEVEL_OUTOF10: 8
PAINLEVEL_OUTOF10: 8
PAINLEVEL_OUTOF10: 6
PAINLEVEL_OUTOF10: 6
PAINLEVEL_OUTOF10: 7

## 2020-10-17 ASSESSMENT — PAIN DESCRIPTION - ORIENTATION
ORIENTATION: MID
ORIENTATION: LOWER;MID

## 2020-10-17 ASSESSMENT — PULMONARY FUNCTION TESTS
PIF_VALUE: 11
PIF_VALUE: 10
PIF_VALUE: 22
PIF_VALUE: 11
PIF_VALUE: 10
PIF_VALUE: 23
PIF_VALUE: 11
PIF_VALUE: 22
PIF_VALUE: 11
PIF_VALUE: 22
PIF_VALUE: 11
PIF_VALUE: 22
PIF_VALUE: 11
PIF_VALUE: 23
PIF_VALUE: 22
PIF_VALUE: 23
PIF_VALUE: 25
PIF_VALUE: 11
PIF_VALUE: 24
PIF_VALUE: 23

## 2020-10-17 ASSESSMENT — PAIN DESCRIPTION - DESCRIPTORS
DESCRIPTORS: ACHING;SORE
DESCRIPTORS: ACHING;SORE

## 2020-10-17 ASSESSMENT — PAIN DESCRIPTION - FREQUENCY
FREQUENCY: CONTINUOUS
FREQUENCY: CONTINUOUS

## 2020-10-17 ASSESSMENT — PAIN DESCRIPTION - PROGRESSION
CLINICAL_PROGRESSION: NOT CHANGED

## 2020-10-17 ASSESSMENT — PAIN DESCRIPTION - PAIN TYPE
TYPE: SURGICAL PAIN;CHRONIC PAIN
TYPE: SURGICAL PAIN;CHRONIC PAIN

## 2020-10-17 ASSESSMENT — PAIN DESCRIPTION - ONSET
ONSET: ON-GOING
ONSET: ON-GOING

## 2020-10-17 ASSESSMENT — PAIN DESCRIPTION - LOCATION
LOCATION: ABDOMEN;BACK
LOCATION: BACK;ABDOMEN

## 2020-10-17 ASSESSMENT — PAIN - FUNCTIONAL ASSESSMENT
PAIN_FUNCTIONAL_ASSESSMENT: PREVENTS OR INTERFERES SOME ACTIVE ACTIVITIES AND ADLS
PAIN_FUNCTIONAL_ASSESSMENT: PREVENTS OR INTERFERES SOME ACTIVE ACTIVITIES AND ADLS

## 2020-10-17 NOTE — PROGRESS NOTES
Medication Reconciliation    List of medications patient is currently taking is complete. Source of information: 1. RN Interview                                      2. EPIC records                                      3. Dispense history      Allergies  Ciprocinonide [fluocinolone]     Notes regarding home medications:   1. Amlodipine dose decreased to 5 mg BID.

## 2020-10-17 NOTE — PROGRESS NOTES
Department of Internal Medicine  General Internal Medicine  Attending Progress Note      SUBJECTIVE:  Pt is post op 1,  post AAA repair, awake, NG in place, feeling tired, has some pain, Afebrile,     OBJECTIVE      Medications    Current Facility-Administered Medications: fentaNYL (SUBLIMAZE) injection 25 mcg, 25 mcg, Intravenous, Q1H PRN  hydrALAZINE (APRESOLINE) injection 10 mg, 10 mg, Intravenous, Q6H PRN  amLODIPine (NORVASC) tablet 10 mg, 10 mg, Per NG tube, BID  pravastatin (PRAVACHOL) tablet 40 mg, 40 mg, Per NG tube, Daily  sodium chloride flush 0.9 % injection 10 mL, 10 mL, Intravenous, 2 times per day  sodium chloride flush 0.9 % injection 10 mL, 10 mL, Intravenous, PRN  promethazine (PHENERGAN) tablet 12.5 mg, 12.5 mg, Oral, Q6H PRN **OR** ondansetron (ZOFRAN) injection 4 mg, 4 mg, Intravenous, Q6H PRN  glucose (GLUTOSE) 40 % oral gel 15 g, 15 g, Oral, PRN  dextrose 50 % IV solution, 12.5 g, Intravenous, PRN  glucagon (rDNA) injection 1 mg, 1 mg, Intramuscular, PRN  dextrose 5 % solution, 100 mL/hr, Intravenous, PRN  0.9 % sodium chloride infusion, , Intravenous, Continuous  famotidine (PEPCID) injection 20 mg, 20 mg, Intravenous, BID  insulin lispro (HUMALOG) injection vial 0-12 Units, 0-12 Units, Subcutaneous, Q4H  phenylephrine (KIARA-SYNEPHRINE) 50 mg in dextrose 5 % 250 mL infusion, 50 mcg/min, Intravenous, Continuous  potassium chloride 20 mEq/50 mL IVPB (Central Line), 20 mEq, Intravenous, PRN  Physical    VITALS:  BP (!) 147/57   Pulse 90   Temp 98.1 °F (36.7 °C) (Temporal)   Resp 13   Ht 5' 5\" (1.651 m)   Wt 204 lb 9.4 oz (92.8 kg)   SpO2 97%   BMI 34.05 kg/m²   CONSTITUTIONAL:  awake, alert, cooperative, no apparent distress, and appears stated age  ENT:  NG in place,  LUNGS:  No increased work of breathing, good air exchange, clear to auscultation bilaterally, no crackles or wheezing  CARDIOVASCULAR:  Normal apical impulse, regular rate and rhythm, normal S1 and S2, no S3 or S4, and no murmur noted  ABDOMEN:  S/p abd AAA surgey +BS,   MUSCULOSKELETAL:  There is no redness, warmth, or swelling of the joints. Full range of motion noted. Motor strength is 5 out of 5 all extremities bilaterally. Tone is normal.  Data    CBC:   Lab Results   Component Value Date    WBC 13.4 10/17/2020    RBC 4.21 10/17/2020    HGB 12.5 10/17/2020    HCT 38.0 10/17/2020    MCV 90.4 10/17/2020    MCH 29.6 10/17/2020    MCHC 32.7 10/17/2020    RDW 15.1 10/17/2020     10/17/2020    MPV 8.1 10/17/2020     BMP:    Lab Results   Component Value Date     10/17/2020    K 3.7 10/17/2020    K 4.0 06/23/2020     10/17/2020    CO2 24 10/17/2020    BUN 16 10/17/2020    LABALBU 3.9 06/23/2020    CREATININE 1.1 10/17/2020    CALCIUM 7.2 10/17/2020    GFRAA 59 10/17/2020    GFRAA >60 03/15/2011    LABGLOM 49 10/17/2020    GLUCOSE 122 10/17/2020       ASSESSMENT AND PLAN      Active Problems:  1. S/p AAA aneurysm repair, post op 1 f/u by dr Namita Garrett. Hemodynamically stable, NG in place. Extubated. NPO. cont pain meds prn. 2.HTN cont med,   3. DM cont meds. Sliding scale ins.coverage. 4. PAD stable,  Dr Dennis Bergeron

## 2020-10-17 NOTE — OP NOTE
[address] [place] [address] [facility]                                OPERATIVE REPORT    PATIENT NAME: Darwin Guevara                   :        1950  MED REC NO:   [id number]                          ROOM:       6899  ACCOUNT NO:   [de-identified]                           ADMIT DATE: 10/16/2020  PROVIDER:     Jorge Ragland MD      DATE OF PROCEDURE:  10/16/2020    PREOPERATIVE DIAGNOSIS:  Enlarging asymptomatic abdominal aortic  aneurysm (5.9 cm). POSTOPERATIVE DIAGNOSIS:  Enlarging asymptomatic abdominal aortic  aneurysm (5.9 cm). OPERATION PERFORMED:  Open repair of abdominal aortic aneurysm with  suprarenal clamp (16-mm Dacron graft). SURGEON:  Jorge Ragland MD    ANESTHESIA:  General endotracheal.    ESTIMATED BLOOD LOSS:  700 mL with cell saver re-use. INDICATIONS:  The patient is a 57-year-old lady with a known history of  abdominal aortic aneurysm, which has been followed and found to enlarged  to 5.9 cm. Anatomically, she was not a good candidate for a stent graft  whether that be fenestrated or standard because of the proximity of the  aneurysm to her renal arteries as well as other anatomic considerations. For this reason, it was suggested she undergo an open repair of the  suprarenal clamp. She agreed understanding the risks, benefits and  other options. OPERATIVE PROCEDURE:  The patient was brought to the operating room and  placed on the operating table in supine position. After adequate  induction of anesthesia and placement of monitoring devices, the abdomen  was prepped and draped in a sterile fashion. A midline incision from below the xiphoid to just the level of umbilicus  was made. Subcutaneous tissue was divided using electrocautery down to  the fascial midline, which was then opened longitudinally. The patient  had previously had right paramedian incision.   There were adhesions to  the omentum. The omentum was then carefully sharply dissected away from  the abdominal wall with adhesiolysis. After mobilization of the omentum, the bowel was extracted from the  abdomen and placed to the right side of the abdominal wall. The  duodenum and proximal jejunum were then incised along the ligament of  Treitz to mobilize them off the abdominal aortic aneurysm. The bowel  was then packed back in the abdomen with a moist towel. Bookwalter retractor was placed on the field and blades were placed  sequentially for maximal exposure. Electrocautery was used to divide  the soft tissue overlying the aneurysm and to follow it proximally. The  inferior mesenteric vein was identified and protected. The crossing  left renal vein was identified and mobilized. The left adrenal vein was  divided between ligatures and a clip to facilitate further mobilization  superiorly. The renal vein was circumferentially controlled with a  vessel loop. Deep to this, the aorta was dissected where it was  undiseased and the two renal arteries were identified. Suprarenal  mobilization of the aorta on both lateral borders was obtained for  clamping. The renal vein had been mobilized adequately enough to allow  for the clamp to be placed below the renal vein and to retract it  cephalad. Electrocautery was then used to continue opening the  retroperitoneum down to the aortic bifurcation. The origin of the two  iliac arteries were identified and each was mobilized enough to allow  for application of clamps. At this time, measurements were obtained, it was decided to proceed with  a 60-mm Dacron tube graft. This was brought on field and soaked in  antibiotic solution. The patient was given a total of 4000 units of  heparin and clamps were applied to the iliac arteries bilaterally after  adequate circulation time. The suprarenal clamp was applied and the  patient tolerated this well.   The aorta was then opened longitudinally  and mural thrombus was removed. A large posterior component of the  aneurysm, which was saccular in nature was identified and evacuated. Several bleeding lumbars were suture ligated with 3-0 Prolenes. The  aortic wall was then transected just below the level of the bilateral  renal arteries leaving a posterior lip in place. The 60-mm Dacron graft  was brought on the field and sewed in an end-to-end fashion using the  onlay technique preserving the bilateral renal arteries. After completion of this, the renal arteries were clamped and the aorta  was flushed antegrade and then irrigated with copious amounts of heparin  and saline. An atraumatic clamp was then applied to the graft and flow  was slowly restored after flushing antegrade. The patient tolerated  this well and there was no significant anastomotic bleeding. The  anastomosis was packed with Surgicel and a dry gauze. The aorta was  then opened further distally down to just above the aortic bifurcation  where it was partially T'd off leaving a posterior line in place. The  origin of the inferior mesenteric artery was identified and appeared to  be widely patent with no significant backbleeding. The graft was cut to  an appropriate length and a standard end-to-end anastomosis was created  using running 3-0 Prolene with an onlay technique again. Prior to  completion of this anastomosis, the iliac arteries were backbled and the  aorta was flushed antegrade. The lumen was flushed with heparinized  solution. The anastomosis was completed and flow was slowly restored  first to the left pelvis by applying pressure to the left femoral artery  and then slowly restored to the left leg, which the patient tolerated  well. A similar technique was performed on the right leg, which the patient  tolerated well also. There were several bleeding sites from needle  holes, which eventually were controlled with Surgicel.   The inferior  mesenteric artery was evaluated at this point and there was some  backbleeding. The sigmoid colon was evaluated and appeared to be  healthy. There was significant stenotic disease in the origin of the  inferior mesenteric artery just distal to its aortic origin and it was  thought at this time after evaluating the sigmoid colon that ligation of  the COLIN would be appropriate. This was done with 2-0 silk ligature on  the aortic wall. The patient remained hemodynamically stable and at  this point the retroperitoneum and the aneurysm sac were closed over the  graft using two separate running 2-0 Vicryl sutures. The bowel was  returned to normal location and all appeared viable. The fascia was  closed using two separate running #1 PDS loop sutures. Skin was closed  using subcuticular stitch of 4-0 Vicryl and Dermabond. The patient was  then transferred directly to the ICU intubated and in a stable  condition.         Damien Rodarte MD    D: 10/16/2020 17:22:40       T: 10/17/2020 2:33:13     [id number]  Job#: [id number]     Doc#: [id number]    CC:

## 2020-10-17 NOTE — PROGRESS NOTES
Late entry due to patient care requirements. Shift report & bedside handoff with Clarice Dodge RN. Patient on vent mode SIMV, RIJ CVC in place, right radial arterial line with appropriate wave forms, chowdhury catheter draining clear yellow urine. NG to LCWS. Patient RASS goal -1/-2, patient awakens occasionally and is non-compliant with vent, coughing & gagging with peak pressures in high 30's to 40, sedation titrated to achieve RASS goal.  ABG drawn at 2000 & results paged out to Dr Reed Estrada; minor vent setting changes made per flow sheet, redraw in 1 hour. ABG drawn again at 2140 and communicated to Dr Dot Segura; no further vent changes at this time. Telephone order received to decrease IV fluids to 125ml/hr, reduce RR on vent to 8 at 0500 and reduce sedation level by 50% at the same time, and draw an ABG prior to Dr Dot Segura arriving at 0600. @2350 Patient SBP dropped below goal so Rd-Synephrine was intitiated per eMAR order. @0000 Reassessment completed. Patient RASS -3, sedation infusion will be weaned down per eMAR to achieve RASS goal.    @0030 Rd-synephrine currently at 25mcg/min to achieve SBP goal.     @0320 Contact made with Dr Dot Segura d/t UO < 60ml last 2 hours; no changes re UO,  new orders received to reduce RR on vent to 8 since patient is waking up on current sedation level per eMAR, recheck ABG in 1 hour. @3638 Blood sent to lab for analysis. @0400 Reassessment completed. VSS, off Rd-Synephrine, afebrile, CVP 8.    @0445 ABG sent for analysis. @0500 patient becoming increasingly restless & awake; sedation not increased as Dr Dot Segura coming in around 0600 to assess readiness for extubation. @3024 ABG sent for analysis. @3116 Dr Dot Segura at bedside. @7823 Patient extubated to NC @ 4LPM per Dr Dot Segura verbal order at bedside.

## 2020-10-17 NOTE — PROGRESS NOTES
Late entry due to patient care. 1700 Patient transferred to CVU. Intubated on the ventilator. Report received at the bedside. Midline abdominal incision assessed. VS stable. Avelar cath with good urine output. Restraints placed for safety as patient slightly agitated. Plan to start propofol for sedation    1710 Dr Shanda Hanyes at the bedside. Plan to obtain ABG at 1800 and update him with results    1721 Fentanyl infusion started as patient remains agitated at times. [address] Dr Shanda Haynes updated on patient lab results. Order given for 1 amp of bicarb. Plan to increase rate on vent to 12 and TV increased to 550. 1 gram of Ca+ ordered for replacement. Plan to draw ABG in 1 hour following ventilator changes. Updated on patient VS sign B/P currently 135/51. Order to hold B/P medication for SBP < 120.     1930 Handoff report completed with Valentina Kruger RN.

## 2020-10-17 NOTE — PROGRESS NOTES
Results for Tobin Montero (MRN [id number]) as of 10/16/2020 20:31   Ref.  Range 10/16/2020 20:07   pH, Arterial Latest Ref Range: 7.350 - 7.450  7.319 (L)   pCO2, Arterial Latest Ref Range: 35.0 - 45.0 mmHg 50.3 (H)   pO2, Arterial Latest Ref Range: 75.0 - 108.0 mmHg 108.0   HCO3, Arterial Latest Ref Range: 21.0 - 29.0 mmol/L 25.8       Increased Vt  550 to 600

## 2020-10-17 NOTE — PLAN OF CARE
Problem: Falls - Risk of:  Goal: Will remain free from falls  Description: Will remain free from falls  Outcome: Ongoing  Note: Falls risk indicators posted; non-skid socks, sound-com dome light outside room. Bed low and locked; call light within reach. Instructed to notify staff for assistance to get out of bed. Problem: Skin Integrity:  Goal: Will show no infection signs and symptoms  Description: Will show no infection signs and symptoms  Outcome: Ongoing  Note: Monitor surgical incisions for s/s of infection. Assist pt to turn/reposition Q2h as needed if pt unable to do so independently. Assess and pad bony prominences to prevent deep or soft tissue injury. Problem: Pain:  Goal: Pain level will decrease  Description: Pain level will decrease  Outcome: Ongoing  Note: Education on appropriate numerical pain rating scale 0-10; pt will notify staff for increased pain/discomfort. Non-pharmacological treatments/therapies.  Pain medications as needed

## 2020-10-18 LAB
ANION GAP SERPL CALCULATED.3IONS-SCNC: 7 MMOL/L (ref 3–16)
BUN BLDV-MCNC: 10 MG/DL (ref 7–20)
CALCIUM SERPL-MCNC: 7.6 MG/DL (ref 8.3–10.6)
CHLORIDE BLD-SCNC: 108 MMOL/L (ref 99–110)
CO2: 26 MMOL/L (ref 21–32)
CREAT SERPL-MCNC: 0.8 MG/DL (ref 0.6–1.2)
GFR AFRICAN AMERICAN: >60
GFR NON-AFRICAN AMERICAN: >60
GLUCOSE BLD-MCNC: 101 MG/DL (ref 70–99)
GLUCOSE BLD-MCNC: 111 MG/DL (ref 70–99)
GLUCOSE BLD-MCNC: 113 MG/DL (ref 70–99)
GLUCOSE BLD-MCNC: 115 MG/DL (ref 70–99)
GLUCOSE BLD-MCNC: 99 MG/DL (ref 70–99)
HCT VFR BLD CALC: 34.3 % (ref 36–48)
HEMOGLOBIN: 11.2 G/DL (ref 12–16)
LIPASE: 34 U/L (ref 13–60)
MCH RBC QN AUTO: 29.5 PG (ref 26–34)
MCHC RBC AUTO-ENTMCNC: 32.6 G/DL (ref 31–36)
MCV RBC AUTO: 90.4 FL (ref 80–100)
PDW BLD-RTO: 15.2 % (ref 12.4–15.4)
PERFORMED ON: ABNORMAL
PERFORMED ON: NORMAL
PLATELET # BLD: 143 K/UL (ref 135–450)
PMV BLD AUTO: 7.9 FL (ref 5–10.5)
POTASSIUM SERPL-SCNC: 3.5 MMOL/L (ref 3.5–5.1)
RBC # BLD: 3.8 M/UL (ref 4–5.2)
REASON FOR REJECTION: NORMAL
REJECTED TEST: NORMAL
SODIUM BLD-SCNC: 141 MMOL/L (ref 136–145)
WBC # BLD: 12.6 K/UL (ref 4–11)

## 2020-10-18 PROCEDURE — 83690 ASSAY OF LIPASE: CPT

## 2020-10-18 PROCEDURE — 97116 GAIT TRAINING THERAPY: CPT

## 2020-10-18 PROCEDURE — 97530 THERAPEUTIC ACTIVITIES: CPT

## 2020-10-18 PROCEDURE — 6370000000 HC RX 637 (ALT 250 FOR IP): Performed by: SURGERY

## 2020-10-18 PROCEDURE — 80048 BASIC METABOLIC PNL TOTAL CA: CPT

## 2020-10-18 PROCEDURE — 2500000003 HC RX 250 WO HCPCS: Performed by: SURGERY

## 2020-10-18 PROCEDURE — 85027 COMPLETE CBC AUTOMATED: CPT

## 2020-10-18 PROCEDURE — 6360000002 HC RX W HCPCS: Performed by: SURGERY

## 2020-10-18 PROCEDURE — 2100000000 HC CCU R&B

## 2020-10-18 PROCEDURE — 97162 PT EVAL MOD COMPLEX 30 MIN: CPT

## 2020-10-18 PROCEDURE — 2580000003 HC RX 258: Performed by: SURGERY

## 2020-10-18 PROCEDURE — 94761 N-INVAS EAR/PLS OXIMETRY MLT: CPT

## 2020-10-18 PROCEDURE — 99024 POSTOP FOLLOW-UP VISIT: CPT | Performed by: SURGERY

## 2020-10-18 RX ORDER — PRAVASTATIN SODIUM 40 MG
40 TABLET ORAL DAILY
Status: DISCONTINUED | OUTPATIENT
Start: 2020-10-18 | End: 2020-10-23 | Stop reason: HOSPADM

## 2020-10-18 RX ORDER — FUROSEMIDE 10 MG/ML
20 INJECTION INTRAMUSCULAR; INTRAVENOUS ONCE
Status: COMPLETED | OUTPATIENT
Start: 2020-10-18 | End: 2020-10-18

## 2020-10-18 RX ORDER — AMLODIPINE BESYLATE 10 MG/1
10 TABLET ORAL 2 TIMES DAILY
Status: DISCONTINUED | OUTPATIENT
Start: 2020-10-18 | End: 2020-10-23 | Stop reason: HOSPADM

## 2020-10-18 RX ADMIN — SODIUM CHLORIDE, PRESERVATIVE FREE 10 ML: 5 INJECTION INTRAVENOUS at 23:17

## 2020-10-18 RX ADMIN — FAMOTIDINE 20 MG: 10 INJECTION INTRAVENOUS at 21:27

## 2020-10-18 RX ADMIN — AMLODIPINE BESYLATE 10 MG: 10 TABLET ORAL at 10:08

## 2020-10-18 RX ADMIN — MORPHINE SULFATE 2 MG: 2 INJECTION, SOLUTION INTRAMUSCULAR; INTRAVENOUS at 00:47

## 2020-10-18 RX ADMIN — FUROSEMIDE 20 MG: 10 INJECTION, SOLUTION INTRAMUSCULAR; INTRAVENOUS at 08:35

## 2020-10-18 RX ADMIN — MORPHINE SULFATE 2 MG: 2 INJECTION, SOLUTION INTRAMUSCULAR; INTRAVENOUS at 23:12

## 2020-10-18 RX ADMIN — MORPHINE SULFATE 2 MG: 2 INJECTION, SOLUTION INTRAMUSCULAR; INTRAVENOUS at 17:21

## 2020-10-18 RX ADMIN — AMLODIPINE BESYLATE 10 MG: 10 TABLET ORAL at 21:27

## 2020-10-18 RX ADMIN — FAMOTIDINE 20 MG: 10 INJECTION INTRAVENOUS at 10:08

## 2020-10-18 RX ADMIN — PRAVASTATIN SODIUM 40 MG: 40 TABLET ORAL at 10:08

## 2020-10-18 ASSESSMENT — PAIN DESCRIPTION - PROGRESSION

## 2020-10-18 ASSESSMENT — PAIN SCALES - GENERAL
PAINLEVEL_OUTOF10: 5
PAINLEVEL_OUTOF10: 3
PAINLEVEL_OUTOF10: 4
PAINLEVEL_OUTOF10: 5
PAINLEVEL_OUTOF10: 5
PAINLEVEL_OUTOF10: 8
PAINLEVEL_OUTOF10: 0
PAINLEVEL_OUTOF10: 5

## 2020-10-18 ASSESSMENT — PAIN DESCRIPTION - LOCATION
LOCATION: BACK
LOCATION: BACK;ABDOMEN

## 2020-10-18 ASSESSMENT — PAIN DESCRIPTION - ONSET
ONSET: ON-GOING

## 2020-10-18 ASSESSMENT — PAIN DESCRIPTION - FREQUENCY
FREQUENCY: CONTINUOUS

## 2020-10-18 ASSESSMENT — PAIN DESCRIPTION - DESCRIPTORS
DESCRIPTORS: ACHING;SORE
DESCRIPTORS: ACHING

## 2020-10-18 ASSESSMENT — PAIN - FUNCTIONAL ASSESSMENT
PAIN_FUNCTIONAL_ASSESSMENT: PREVENTS OR INTERFERES SOME ACTIVE ACTIVITIES AND ADLS

## 2020-10-18 ASSESSMENT — PAIN DESCRIPTION - ORIENTATION
ORIENTATION: LOWER
ORIENTATION: LOWER;MID

## 2020-10-18 ASSESSMENT — PAIN DESCRIPTION - PAIN TYPE
TYPE: SURGICAL PAIN

## 2020-10-18 ASSESSMENT — PULMONARY FUNCTION TESTS: PIF_VALUE: 0

## 2020-10-18 NOTE — ANESTHESIA POSTPROCEDURE EVALUATION
Department of Anesthesiology  Postprocedure Note    Patient: Concha Del Toro  MRN: [id number]  YOB: 1950  Date of evaluation: 10/18/2020  Time:  2:31 PM     Procedure Summary     Date:  10/16/20 Room / Location:   [facility] [address] [place] [address]    Anesthesia Start:  3789 Anesthesia Stop:  4853    Procedure:  OPEN REPAIR ABDOMINAL AORTIC ANEURYSM (N/A Abdomen) Diagnosis:       Abdominal aortic aneurysm (AAA) without rupture ([address]      (ABDOMINAL AORTIC ANEURYSM)    Surgeon:  Leigh Barton MD Responsible Provider:  Alex Pedersen MD    Anesthesia Type:  general ASA Status:  4          Anesthesia Type: general    Danelle Phase I: Danelle Score: 10    Danelle Phase II:      Last vitals: Reviewed and per EMR flowsheets. Anesthesia Post Evaluation    Patient location during evaluation: ICU  Patient participation: complete - patient cannot participate  Level of consciousness: sedated and ventilated  Pain scale: unable to assess.   Airway patency: patent  Nausea & Vomiting: no nausea and no vomiting  Cardiovascular status: hemodynamically stable  Respiratory status: ventilator  Hydration status: euvolemic

## 2020-10-18 NOTE — PROGRESS NOTES
Physical Therapy    [facility]  Initial Assessment    NAME: Rosalinda Niño  : 1950  MRN: [id number]    Date of Service: 10/18/2020    Discharge Recommendations:  Continue to assess pending progress   PT Equipment Recommendations  Other: Will monitor for potential equipt needs. Assessment   Body structures, Functions, Activity limitations: Decreased functional mobility ; Decreased endurance  Assessment: 64 y/o female admit 10/16/2020 with Enlarging Asymptomatic AAA. 10/16/2020 S/P Open Repair AAA. PMH as noted including CAD, COPD, AAA, PVD, L Fem-Tib Bypass, Hernia Repair. PTA pt living alone in condo setting with ~7 steps to access. PTA pt independent with daily care and functional mobility. Pt has limited family assist/support nearby although reports supportive friends/neighbors. Will  have to monitor pt's progress closely. Prognosis: Good  Decision Making: Medium Complexity  History: 64 y/o female admit 10/16/2020 with Enlarging Asymptomatic AAA. 10/16/2020 S/P Open Repair AAA. PMH as noted including CAD, COPD, AAA, PVD, L Fem-Tib Bypass, Hernia Repair. Exam: See above. Clinical Presentation: See above. Patient Education: Role of PT, POC, Need to call for assist, Safe use of Walker. Barriers to Learning: None. REQUIRES PT FOLLOW UP: Yes  Activity Tolerance  Activity Tolerance: Patient Tolerated treatment well;Patient limited by endurance       Patient Diagnosis(es): There were no encounter diagnoses. has a past medical history of Arthritis, Asthma, Back pain, CAD (coronary artery disease), Cellulitis, COPD (chronic obstructive pulmonary disease) ([address] Depression, Diabetes mellitus ([address] Enlarging abdominal aortic aneurysm (AAA) ([address] History of femoral angiogram, Hyperlipidemia, Hypertension, and PVD (peripheral vascular disease) ([address]    has a past surgical history that includes Cholecystectomy; hernia repair; other surgical history (Left, 2015); vascular surgery (Left); Abscess Drainage (Right, 2/19/16); Cardiac catheterization; and Abdominal aortic aneurysm repair (N/A, 10/16/2020). Restrictions  Restrictions/Precautions  Restrictions/Precautions: Fall Risk  Position Activity Restriction  Other position/activity restrictions: NPO except Ice Chips. Vision/Hearing  Vision: Within Functional Limits  Hearing: Within functional limits     Subjective  General  Chart Reviewed: Yes  Patient assessed for rehabilitation services?: Yes  Additional Pertinent Hx: 66 y/o female admit 10/16/2020 with Enlarging Asymptomatic AAA. 10/16/2020 S/P Open Repair AAA. PMH as noted including CAD, COPD, AAA, PVD, L Fem-Tib Bypass, Hernia Repair. Family / Caregiver Present: No  Referring Practitioner: Dr. Dennis Barker  Diagnosis: S/P Open Repair AAA. Follows Commands: Within Functional Limits  Subjective  Subjective: Pt agreeable to PT Eval/Rx. Pain Screening  Patient Currently in Pain: Yes          Orientation  Orientation  Overall Orientation Status: Within Functional Limits  Social/Functional History  Social/Functional History  Lives With: Alone  Type of Home: (Condo.)  Home Layout: One level(7 steps with HR to access condo.)  Home Access: Stairs to enter without rails(1+1 step to enter building.)  Bathroom Shower/Tub: Tub/Shower unit  Bathroom Toilet: Standard  Bathroom Equipment: (No DME.)  Home Equipment: Rolling walker  ADL Assistance: Independent  Homemaking Assistance: Independent  Ambulation Assistance: Independent(Without assist device pta.)  Transfer Assistance: Independent  Active : Yes  Type of occupation: Worked as a . Additional Comments: Son does not live close, limited ability to assist.  Pt reports supportive friends/neighbors. Cognition        Objective     Observation/Palpation  Observation: Recent abdom surg - staples in place.     AROM RLE (degrees)  RLE AROM: WFL  AROM LLE (degrees)  LLE AROM : WFL  AROM RUE (degrees)  RUE AROM : WFL  AROM LUE (degrees)  LUE AROM : WFL  Strength RLE  Strength RLE: WFL  Strength LLE  Strength LLE: WFL  Strength RUE  Strength RUE: WFL  Strength LUE  Strength LUE: WFL     Sensation  Overall Sensation Status: WFL  Bed mobility  Supine to Sit: Moderate assistance(HOB elevated. Use of bedrail.)  Transfers  Sit to Stand: Minimal Assistance(With Walker. Cues for safe hand placement.)  Stand to sit: Minimal Assistance(With Walker. Cues for safe hand placement.)  Ambulation  Ambulation?: Yes  Ambulation 1  Surface: level tile  Device: Rolling Walker  Distance: 5-6 steps bed to chair with [employer] assist.  Diminished step length/clearance; no LE buckling/giving way. Slow/guarded. Plan   Plan  Times per week: 3-5x week while in acute care setting. Current Treatment Recommendations: Functional Mobility Training, Transfer Training, Gait Training, Stair training, Safety Education & Training, Patient/Caregiver Education & Training  Safety Devices  Type of devices: Call light within reach, Left in chair, Nurse notified        AM-PAC Score  AM-PAC Inpatient Mobility Raw Score : 16 (10/18/20 0817)  AM-PAC Inpatient T-Scale Score : 40.78 (10/18/20 0817)  Mobility Inpatient CMS 0-100% Score: 54.16 (10/18/20 0817)  Mobility Inpatient CMS G-Code Modifier : CK (10/18/20 0817)          Goals  Short term goals  Time Frame for Short term goals: Upon d/c acute care setting. Short term goal 1: Bed Mob Supervision. Short term goal 2: Transfers with/without assist device SBA. Short term goal 3: Amb with/without assist device 150' SBA. Short term goal 4: Stairs with/without assist device SBA/CGA. Patient Goals   Patient goals : Return home.        Therapy Time   Individual Concurrent Group Co-treatment   Time In 0740         Time Out 0820         Minutes [address], PT

## 2020-10-18 NOTE — PROGRESS NOTES
VASCULAR                 POD#2     Tolerated extubation. Less pain. No flatus but not bloated.    165/55                         afeb                 CVP 15-18                   I/O +330 (UO 1995, )  Lungs clear  Abd soft with active BS; no distention  Incision intact  B feet warm; palpable L DP pulse     Labs                BUN/creat 10/0.8       Ca 7.6              Lipase 34       HgB pending     A/P:     S/P open AAA repair              Hemodynamically stable without respiratory issues. Renal function preserved without any ATN. DC NGT. Ryan Belcher Keep NPO with ice chips and sips H2O for meds. IS and supplemental O2 as needed.               Up with PT/OT                 Debra Leigh

## 2020-10-18 NOTE — PROGRESS NOTES
@2618- handoff report received from DOT Stewart.    @6613Aram Oats with PT at bedside for eval. Pt assisted with front-wheel walker to bedside chair; non-skid socks on. Pt tolerated well. Chair wheels locked, call light within reach; continue to monitor. @1000- pt assisted back to bed; tolerated well    @1230- pt up to bedside chair; tolerated well    @1350- pt assisted back to bed; tolerated well    @1451- ; no coveage    @1721- 2 mg morphine IVP administered per orders; continued back pain. @5229- handoff report given to Michelle Duncan RN.     Electronically signed by Cyrus Fajardo RN on 10/18/2020 at 8:31 PM

## 2020-10-18 NOTE — PROGRESS NOTES
Physician Progress Note      PATIENT:               Monse Quinonez  CSN #:                  [id number]  :                       1950  ADMIT DATE:       10/16/2020 7:14 AM  DISCH DATE:  RESPONDING  PROVIDER #:        Juli Contreras MD          QUERY TEXT:    Pt admitted with AAA repair. If possible, please document in the progress   notes and discharge summary if you are evaluating and/or treating any of the   following: The medical record reflects the following:  Risk Factors: AAA repair  Clinical Indicators:  H&H on 10/16 0835  14.4/43.2---on 10/16 1600    10.8/32.4  Treatment: Transfused 1 U PRBC, and lab monitoring  Options provided:  -- Intraoperative Acute blood loss anemia as expected  -- Other - I will add my own diagnosis  -- Disagree - Not applicable / Not valid  -- Disagree - Clinically unable to determine / Unknown  -- Refer to Clinical Documentation Reviewer    PROVIDER RESPONSE TEXT:    This patient had Intraoperative acute blood loss anemia as expected.     Query created by: Juvencio Osborn on 10/17/2020 10:48 AM      Electronically signed by:  Juli Contreras MD 10/18/2020 10:20 AM

## 2020-10-18 NOTE — PROGRESS NOTES
Department of Internal Medicine  General Internal Medicine  Attending Progress Note      SUBJECTIVE:  Pt is up, c/o some sore throat. NG is out, Afebrile,  Post op 2. No BM, pass some flatus. OBJECTIVE      Medications    Current Facility-Administered Medications: amLODIPine (NORVASC) tablet 10 mg, 10 mg, Oral, BID  pravastatin (PRAVACHOL) tablet 40 mg, 40 mg, Oral, Daily  furosemide (LASIX) injection 20 mg, 20 mg, Intravenous, Once  hydrALAZINE (APRESOLINE) injection 10 mg, 10 mg, Intravenous, Q6H PRN  glucose (GLUTOSE) 40 % oral gel 15 g, 15 g, Oral, PRN  dextrose 50 % IV solution, 12.5 g, Intravenous, PRN  glucagon (rDNA) injection 1 mg, 1 mg, Intramuscular, PRN  dextrose 5 % solution, 100 mL/hr, Intravenous, PRN  morphine (PF) injection 2 mg, 2 mg, Intravenous, Q1H PRN **OR** morphine (PF) injection 4 mg, 4 mg, Intravenous, Q2H PRN  sodium chloride flush 0.9 % injection 10 mL, 10 mL, Intravenous, 2 times per day  sodium chloride flush 0.9 % injection 10 mL, 10 mL, Intravenous, PRN  promethazine (PHENERGAN) tablet 12.5 mg, 12.5 mg, Oral, Q6H PRN **OR** ondansetron (ZOFRAN) injection 4 mg, 4 mg, Intravenous, Q6H PRN  0.9 % sodium chloride infusion, , Intravenous, Continuous  famotidine (PEPCID) injection 20 mg, 20 mg, Intravenous, BID  insulin lispro (HUMALOG) injection vial 0-12 Units, 0-12 Units, Subcutaneous, Q4H  potassium chloride 20 mEq/50 mL IVPB (Central Line), 20 mEq, Intravenous, PRN  Physical    VITALS:  BP (!) 154/58   Pulse 89   Temp 99.8 °F (37.7 °C) (Oral)   Resp 17   Ht 5' 5\" (1.651 m)   Wt 199 lb 1.2 oz (90.3 kg)   SpO2 95%   BMI 33.13 kg/m²   CONSTITUTIONAL:  awake, alert, cooperative, no apparent distress, and appears stated age  ENT:  Mild sore throat.   LUNGS:  No increased work of breathing, good air exchange, clear to auscultation bilaterally, no crackles or wheezing  CARDIOVASCULAR:  Normal apical impulse, regular rate and rhythm, normal S1 and S2, no S3 or S4, and no murmur noted  ABDOMEN:  No scars, normal bowel sounds, soft, non-distended, non-tender, no masses palpated, no hepatosplenomegally  MUSCULOSKELETAL:  There is no redness, warmth, or swelling of the joints. Full range of motion noted. Motor strength is 5 out of 5 all extremities bilaterally. Tone is normal.  Data    CBC:   Lab Results   Component Value Date    WBC 12.6 10/18/2020    RBC 3.80 10/18/2020    HGB 11.2 10/18/2020    HCT 34.3 10/18/2020    MCV 90.4 10/18/2020    MCH 29.5 10/18/2020    MCHC 32.6 10/18/2020    RDW 15.2 10/18/2020     10/18/2020    MPV 7.9 10/18/2020     BMP:    Lab Results   Component Value Date     10/18/2020    K 3.5 10/18/2020    K 4.0 06/23/2020     10/18/2020    CO2 26 10/18/2020    BUN 10 10/18/2020    LABALBU 3.9 06/23/2020    CREATININE 0.8 10/18/2020    CALCIUM 7.6 10/18/2020    GFRAA >60 10/18/2020    GFRAA >60 03/15/2011    LABGLOM >60 10/18/2020    GLUCOSE 111 10/18/2020       ASSESSMENT AND PLAN      Active Problems:  1. S/p AAA aneurysm repair, post op 2.  f/u by dr Travis Tabares. Hemodynamically stable, NG is out. Extubated. On Ice chips. 2.HTN cont med,   3. DM cont meds. Sliding scale ins.coverage. 4. PAD stable,  5.  Mild sore throat, reassurance,  Dr Lia Caldwell

## 2020-10-19 ENCOUNTER — APPOINTMENT (OUTPATIENT)
Dept: GENERAL RADIOLOGY | Age: 70
DRG: 268 | End: 2020-10-19
Attending: SURGERY
Payer: MEDICARE

## 2020-10-19 LAB
ANION GAP SERPL CALCULATED.3IONS-SCNC: 10 MMOL/L (ref 3–16)
ANION GAP SERPL CALCULATED.3IONS-SCNC: 9 MMOL/L (ref 3–16)
BUN BLDV-MCNC: 10 MG/DL (ref 7–20)
BUN BLDV-MCNC: 10 MG/DL (ref 7–20)
CALCIUM SERPL-MCNC: 8.3 MG/DL (ref 8.3–10.6)
CALCIUM SERPL-MCNC: 8.6 MG/DL (ref 8.3–10.6)
CHLORIDE BLD-SCNC: 104 MMOL/L (ref 99–110)
CHLORIDE BLD-SCNC: 105 MMOL/L (ref 99–110)
CO2: 26 MMOL/L (ref 21–32)
CO2: 27 MMOL/L (ref 21–32)
CREAT SERPL-MCNC: 0.7 MG/DL (ref 0.6–1.2)
CREAT SERPL-MCNC: 0.7 MG/DL (ref 0.6–1.2)
GFR AFRICAN AMERICAN: >60
GFR AFRICAN AMERICAN: >60
GFR NON-AFRICAN AMERICAN: >60
GFR NON-AFRICAN AMERICAN: >60
GLUCOSE BLD-MCNC: 129 MG/DL (ref 70–99)
GLUCOSE BLD-MCNC: 131 MG/DL (ref 70–99)
GLUCOSE BLD-MCNC: 132 MG/DL (ref 70–99)
GLUCOSE BLD-MCNC: 154 MG/DL (ref 70–99)
GLUCOSE BLD-MCNC: 157 MG/DL (ref 70–99)
GLUCOSE BLD-MCNC: 95 MG/DL (ref 70–99)
GLUCOSE BLD-MCNC: 96 MG/DL (ref 70–99)
GLUCOSE BLD-MCNC: 97 MG/DL (ref 70–99)
PERFORMED ON: ABNORMAL
PERFORMED ON: NORMAL
PERFORMED ON: NORMAL
POTASSIUM SERPL-SCNC: 3.1 MMOL/L (ref 3.5–5.1)
POTASSIUM SERPL-SCNC: 3.3 MMOL/L (ref 3.5–5.1)
SODIUM BLD-SCNC: 139 MMOL/L (ref 136–145)
SODIUM BLD-SCNC: 142 MMOL/L (ref 136–145)

## 2020-10-19 PROCEDURE — 97166 OT EVAL MOD COMPLEX 45 MIN: CPT

## 2020-10-19 PROCEDURE — 6370000000 HC RX 637 (ALT 250 FOR IP): Performed by: SURGERY

## 2020-10-19 PROCEDURE — 2700000000 HC OXYGEN THERAPY PER DAY

## 2020-10-19 PROCEDURE — 94761 N-INVAS EAR/PLS OXIMETRY MLT: CPT

## 2020-10-19 PROCEDURE — 2580000003 HC RX 258: Performed by: SURGERY

## 2020-10-19 PROCEDURE — 2500000003 HC RX 250 WO HCPCS: Performed by: SURGERY

## 2020-10-19 PROCEDURE — 99024 POSTOP FOLLOW-UP VISIT: CPT | Performed by: SURGERY

## 2020-10-19 PROCEDURE — 80048 BASIC METABOLIC PNL TOTAL CA: CPT

## 2020-10-19 PROCEDURE — 6360000002 HC RX W HCPCS: Performed by: SURGERY

## 2020-10-19 PROCEDURE — 1200000000 HC SEMI PRIVATE

## 2020-10-19 PROCEDURE — 97530 THERAPEUTIC ACTIVITIES: CPT

## 2020-10-19 PROCEDURE — 36415 COLL VENOUS BLD VENIPUNCTURE: CPT

## 2020-10-19 PROCEDURE — 97116 GAIT TRAINING THERAPY: CPT

## 2020-10-19 PROCEDURE — 71045 X-RAY EXAM CHEST 1 VIEW: CPT

## 2020-10-19 PROCEDURE — 97535 SELF CARE MNGMENT TRAINING: CPT

## 2020-10-19 RX ORDER — OXYCODONE HYDROCHLORIDE AND ACETAMINOPHEN 5; 325 MG/1; MG/1
1 TABLET ORAL EVERY 4 HOURS PRN
Status: DISCONTINUED | OUTPATIENT
Start: 2020-10-19 | End: 2020-10-23 | Stop reason: HOSPADM

## 2020-10-19 RX ORDER — POTASSIUM CHLORIDE 29.8 MG/ML
20 INJECTION INTRAVENOUS ONCE
Status: DISCONTINUED | OUTPATIENT
Start: 2020-10-19 | End: 2020-10-20

## 2020-10-19 RX ORDER — FUROSEMIDE 10 MG/ML
20 INJECTION INTRAMUSCULAR; INTRAVENOUS ONCE
Status: COMPLETED | OUTPATIENT
Start: 2020-10-19 | End: 2020-10-19

## 2020-10-19 RX ORDER — BENZONATATE 100 MG/1
100 CAPSULE ORAL 3 TIMES DAILY PRN
Status: DISCONTINUED | OUTPATIENT
Start: 2020-10-19 | End: 2020-10-23 | Stop reason: HOSPADM

## 2020-10-19 RX ORDER — OXYCODONE HYDROCHLORIDE AND ACETAMINOPHEN 5; 325 MG/1; MG/1
2 TABLET ORAL EVERY 4 HOURS PRN
Status: DISCONTINUED | OUTPATIENT
Start: 2020-10-19 | End: 2020-10-23 | Stop reason: HOSPADM

## 2020-10-19 RX ADMIN — INSULIN LISPRO 2 UNITS: 100 INJECTION, SOLUTION INTRAVENOUS; SUBCUTANEOUS at 18:28

## 2020-10-19 RX ADMIN — FAMOTIDINE 20 MG: 10 INJECTION INTRAVENOUS at 08:51

## 2020-10-19 RX ADMIN — SODIUM CHLORIDE, PRESERVATIVE FREE 10 ML: 5 INJECTION INTRAVENOUS at 08:51

## 2020-10-19 RX ADMIN — AMLODIPINE BESYLATE 10 MG: 10 TABLET ORAL at 08:51

## 2020-10-19 RX ADMIN — Medication 20 MEQ: at 06:09

## 2020-10-19 RX ADMIN — APIXABAN 5 MG: 5 TABLET, FILM COATED ORAL at 21:55

## 2020-10-19 RX ADMIN — OXYCODONE HYDROCHLORIDE AND ACETAMINOPHEN 2 TABLET: 5; 325 TABLET ORAL at 22:51

## 2020-10-19 RX ADMIN — APIXABAN 5 MG: 5 TABLET, FILM COATED ORAL at 08:51

## 2020-10-19 RX ADMIN — Medication 20 MEQ: at 17:27

## 2020-10-19 RX ADMIN — FAMOTIDINE 20 MG: 10 INJECTION INTRAVENOUS at 21:55

## 2020-10-19 RX ADMIN — INSULIN LISPRO 2 UNITS: 100 INJECTION, SOLUTION INTRAVENOUS; SUBCUTANEOUS at 10:31

## 2020-10-19 RX ADMIN — Medication 20 MEQ: at 07:10

## 2020-10-19 RX ADMIN — FUROSEMIDE 20 MG: 10 INJECTION, SOLUTION INTRAMUSCULAR; INTRAVENOUS at 10:31

## 2020-10-19 RX ADMIN — SODIUM CHLORIDE, PRESERVATIVE FREE 10 ML: 5 INJECTION INTRAVENOUS at 21:57

## 2020-10-19 RX ADMIN — Medication 20 MEQ: at 19:28

## 2020-10-19 RX ADMIN — PRAVASTATIN SODIUM 40 MG: 40 TABLET ORAL at 08:51

## 2020-10-19 RX ADMIN — AMLODIPINE BESYLATE 10 MG: 10 TABLET ORAL at 21:55

## 2020-10-19 ASSESSMENT — PAIN SCALES - GENERAL
PAINLEVEL_OUTOF10: 0
PAINLEVEL_OUTOF10: 0
PAINLEVEL_OUTOF10: 8
PAINLEVEL_OUTOF10: 0

## 2020-10-19 ASSESSMENT — PAIN DESCRIPTION - PROGRESSION
CLINICAL_PROGRESSION: NOT CHANGED

## 2020-10-19 ASSESSMENT — PAIN - FUNCTIONAL ASSESSMENT: PAIN_FUNCTIONAL_ASSESSMENT: PREVENTS OR INTERFERES SOME ACTIVE ACTIVITIES AND ADLS

## 2020-10-19 ASSESSMENT — PAIN DESCRIPTION - ORIENTATION: ORIENTATION: LOWER

## 2020-10-19 ASSESSMENT — PAIN DESCRIPTION - LOCATION: LOCATION: BACK

## 2020-10-19 ASSESSMENT — PAIN DESCRIPTION - DESCRIPTORS: DESCRIPTORS: ACHING;SORE

## 2020-10-19 ASSESSMENT — PAIN DESCRIPTION - ONSET: ONSET: ON-GOING

## 2020-10-19 ASSESSMENT — PAIN DESCRIPTION - PAIN TYPE: TYPE: SURGICAL PAIN

## 2020-10-19 ASSESSMENT — PAIN DESCRIPTION - FREQUENCY: FREQUENCY: CONTINUOUS

## 2020-10-19 NOTE — PROGRESS NOTES
Late Entry:  @ 0740 Shift handoff with Cori Marshall RN after transfer handoff called by Cori Marshall RN. Pt awake/alert in recliner chair. Call light in reach. @ [address] [address] on [facility] portable telemetry. Assisted x2 using front wheeled walker from recliner chair to stretcher to transport up to room 4118.

## 2020-10-19 NOTE — PROGRESS NOTES
Attempted to draw repeat BMP from pt's IJ. Brisk blood return from brown lumen, not enough for sample. No blood return from other 2 lumens. Lab made aware.   Electronically signed by Jacob Bean RN on 10/19/2020 at 10:53 AM

## 2020-10-19 NOTE — PLAN OF CARE
Problem: Nutrition  Goal: Optimal nutrition therapy  Outcome: Ongoing     Problem: Falls - Risk of:  Goal: Will remain free from falls  Description: Will remain free from falls  Outcome: Ongoing  Note: Bed alarm on; pt calls out appropriately.      Problem: Falls - Risk of:  Goal: Absence of physical injury  Description: Absence of physical injury  Outcome: Ongoing     Problem: Skin Integrity:  Goal: Will show no infection signs and symptoms  Description: Will show no infection signs and symptoms  Outcome: Ongoing     Problem: Skin Integrity:  Goal: Absence of new skin breakdown  Description: Absence of new skin breakdown  Outcome: Ongoing

## 2020-10-19 NOTE — CARE COORDINATION
INITIAL CASE MANAGEMENT ASSESSMENT    Reviewed chart, spoke with patient to assess possible discharge needs. Explained Case Management role/services. Living Situation: pt lives alone in own condo , 7 steps to enter, then one floor plan. ADLs: independent     DME: none    PT/OT Recs: n/a     Active Services: none     Transportation: pt is a driving person, but states her neighbor will transport her home at [place]. [facility]ations[facility] [address]    PCP: Dr Rip Mayfield      HD/PD: n/a    PLAN/COMMENTS: pt is planning to return home at [place]. Pt is aware and agreeable to home care if needed at [place]. Pt has chosen [facility], or [facility] care whichever has openings at [place]. Electronically signed by CALI Meredith on 10/19/2020 at 3:03 PM    SW/CM provided contact information for patient or family to call with any questions. SW/CM will follow and assist as needed.

## 2020-10-19 NOTE — PROGRESS NOTES
Reported called to nurse on inpatient rehab unit. IV to be left in per rehab nurse. Will disconnect fluids. Awaiting transport.    Electronically signed by Alba Matute RN on 10/19/2020 at 6:20 PM  ]

## 2020-10-19 NOTE — PROGRESS NOTES
Physical Therapy  Facility/Department: [address] MED SURG  Daily Treatment Note  NAME: Kartik Prieto  : 1950  MRN: [id number]    Date of Service: 10/19/2020    Discharge Recommendations:  Continue to assess pending progress   PT Equipment Recommendations  Other: Will monitor for potential equipt needs. Kartik Prieto scored a 16/24 on the AM-PAC short mobility form. Current research shows that an AM-PAC score of 17 or less is typically not associated with a discharge to the patient's home setting. Based on the patient's AM-PAC score and their current functional mobility deficits, it is recommended that the patient have 3-5 sessions per week of Physical Therapy at d/c to increase the patient's independence. Please see assessment section for further patient specific details. If patient discharges prior to next session this note will serve as a discharge summary. Please see below for the latest assessment towards goals. Assessment   Body structures, Functions, Activity limitations: Decreased functional mobility ; Decreased endurance  Assessment: 64 y/o female admit 10/16/2020 with Enlarging Asymptomatic AAA. 10/16/2020 S/P Open Repair AAA. PMH as noted including CAD, COPD, AAA, PVD, L Fem-Tib Bypass, Hernia Repair. PTA pt living alone in condo setting with ~7 steps to access. PTA pt independent with daily care and functional mobility. Pt has limited family assist/support nearby although reports supportive friends/neighbors. At this time, need to monitor for cont PT Services upon d/c (3-5). Will  have to monitor pt's progress closely. Prognosis: Good  Decision Making: Medium Complexity  History: 64 y/o female admit 10/16/2020 with Enlarging Asymptomatic AAA. 10/16/2020 S/P Open Repair AAA. PMH as noted including CAD, COPD, AAA, PVD, L Fem-Tib Bypass, Hernia Repair. Exam: See above. Clinical Presentation: See above.   Patient Education: Role of PT, POC, Need to call for assist, Safe use placement.)  Ambulation  Ambulation?: Yes  Ambulation 1  Surface: level tile  Device: Rolling Walker  Distance: Pt amb 40' x 2 with Rashida Nehemiah Min assist.    Cues for upright posture and to remain close/safe distance to walker. Diminished step length/clearance; no LE buckling/giving way. Increase cues with transitional activities. Slow/guarded. AM-PAC Score  AM-PAC Inpatient Mobility Raw Score : 16 (10/19/20 0822)  AM-PAC Inpatient T-Scale Score : 40.78 (10/19/20 0561)  Mobility Inpatient CMS 0-100% Score: 54.16 (10/19/20 0832)  Mobility Inpatient CMS G-Code Modifier : CK (10/19/20 5659)          Goals  Short term goals  Time Frame for Short term goals: Upon d/c acute care setting. Short term goal 1: Bed Mob Supervision. Short term goal 2: Transfers with/without assist device SBA. Short term goal 3: Amb with/without assist device 150' SBA. Short term goal 4: Stairs with/without assist device SBA/CGA. Patient Goals   Patient goals : Return home. Plan    Plan  Times per week: 3-5x week while in acute care setting.   Current Treatment Recommendations: Functional Mobility Training, Transfer Training, Gait Training, Stair training, Safety Education & Training, Patient/Caregiver Education & Training  Safety Devices  Type of devices: Call light within reach, Left in chair, Nurse notified     Therapy Time   Individual Concurrent Group Co-treatment   Time In 0715         Time Out 0745         Minutes [address], PT

## 2020-10-19 NOTE — PROGRESS NOTES
Department of Internal Medicine  General Internal Medicine  Attending Progress Note      SUBJECTIVE:  Pt is doing better, has some pain, taking pain med. Up and walking this am, Afebrile. Post op 3.     OBJECTIVE      Medications    Current Facility-Administered Medications: apixaban (ELIQUIS) tablet 5 mg, 5 mg, Oral, BID  oxyCODONE-acetaminophen (PERCOCET) 5-325 MG per tablet 1 tablet, 1 tablet, Oral, Q4H PRN **OR** oxyCODONE-acetaminophen (PERCOCET) 5-325 MG per tablet 2 tablet, 2 tablet, Oral, Q4H PRN  potassium chloride 20 mEq/50 mL IVPB (Central Line), 20 mEq, Intravenous, Once  amLODIPine (NORVASC) tablet 10 mg, 10 mg, Oral, BID  pravastatin (PRAVACHOL) tablet 40 mg, 40 mg, Oral, Daily  hydrALAZINE (APRESOLINE) injection 10 mg, 10 mg, Intravenous, Q6H PRN  glucose (GLUTOSE) 40 % oral gel 15 g, 15 g, Oral, PRN  dextrose 50 % IV solution, 12.5 g, Intravenous, PRN  glucagon (rDNA) injection 1 mg, 1 mg, Intramuscular, PRN  dextrose 5 % solution, 100 mL/hr, Intravenous, PRN  morphine (PF) injection 2 mg, 2 mg, Intravenous, Q1H PRN **OR** morphine (PF) injection 4 mg, 4 mg, Intravenous, Q2H PRN  sodium chloride flush 0.9 % injection 10 mL, 10 mL, Intravenous, 2 times per day  sodium chloride flush 0.9 % injection 10 mL, 10 mL, Intravenous, PRN  promethazine (PHENERGAN) tablet 12.5 mg, 12.5 mg, Oral, Q6H PRN **OR** ondansetron (ZOFRAN) injection 4 mg, 4 mg, Intravenous, Q6H PRN  famotidine (PEPCID) injection 20 mg, 20 mg, Intravenous, BID  insulin lispro (HUMALOG) injection vial 0-12 Units, 0-12 Units, Subcutaneous, Q4H  potassium chloride 20 mEq/50 mL IVPB (Central Line), 20 mEq, Intravenous, PRN  Physical    VITALS:  BP (!) 158/58   Pulse 89   Temp 98.4 °F (36.9 °C) (Oral)   Resp 18   Ht 5' 5\" (1.651 m)   Wt 199 lb 1.2 oz (90.3 kg)   SpO2 90%   BMI 33.13 kg/m²   CONSTITUTIONAL:  awake, alert, cooperative, no apparent distress, and appears stated age  LUNGS:  No increased work of breathing, good air exchange, clear to auscultation bilaterally, no crackles or wheezing  CARDIOVASCULAR:  Normal apical impulse, regular rate and rhythm, normal S1 and S2, no S3 or S4, and no murmur noted  ABDOMEN:  No scars, normal bowel sounds, soft, non-distended, non-tender, no masses palpated, no hepatosplenomegally  MUSCULOSKELETAL:  There is no redness, warmth, or swelling of the joints. Full range of motion noted. Motor strength is 5 out of 5 all extremities bilaterally. Tone is normal.  Data    CBC:   Lab Results   Component Value Date    WBC 12.6 10/18/2020    RBC 3.80 10/18/2020    HGB 11.2 10/18/2020    HCT 34.3 10/18/2020    MCV 90.4 10/18/2020    MCH 29.5 10/18/2020    MCHC 32.6 10/18/2020    RDW 15.2 10/18/2020     10/18/2020    MPV 7.9 10/18/2020     BMP:    Lab Results   Component Value Date     10/19/2020    K 3.1 10/19/2020    K 4.0 06/23/2020     10/19/2020    CO2 27 10/19/2020    BUN 10 10/19/2020    LABALBU 3.9 06/23/2020    CREATININE 0.7 10/19/2020    CALCIUM 8.3 10/19/2020    GFRAA >60 10/19/2020    GFRAA >60 03/15/2011    LABGLOM >60 10/19/2020    GLUCOSE 95 10/19/2020       ASSESSMENT AND PLAN      Active Problems:  Active Problems:  1. S/p AAA aneurysm repair, post op 3.  f/u by dr Cyndee Thompson. Hemodynamically stable, NG is out. Extubated. On Ice chips. cont PT/OT,  Consider advance diet. 2.HTN cont med,   3. DM cont meds. Sliding scale ins.coverage. 4. PAD stable,  5. Mild sore throat, reassurance,  6. Hypokalemia,on supplement,  7. Hx of smoking, has cough, get Tessalon pearles.   Dr Delio Gonzalez

## 2020-10-19 NOTE — PROGRESS NOTES
Pt arrived to room 4118. Oriented to room. Bed locked and in lowest position. Call light within reach. Bed alarm on.   Electronically signed by Alan Storm RN on 10/19/2020 at 8:14 AM

## 2020-10-19 NOTE — PROGRESS NOTES
Occupational Therapy   Occupational Therapy Initial Assessment  Date: 10/19/2020   Patient Name: Elroy Vang  MRN: [id number]     : 1950    Date of Service: 10/19/2020    Discharge Recommendations:  Patient would benefit from continued therapy after discharge, 3-5 sessions per week  OT Equipment Recommendations  Other: defer to [address] scored a 16/24 on the AM-PAC ADL Inpatient form. Current research shows that an AM-PAC score of 17 or less is typically not associated with a discharge to the patient's home setting. Based on the patient's AM-PAC score and their current ADL deficits, it is recommended that the patient have 3-5 sessions per week of Occupational Therapy at d/c to increase the patient's independence. Please see assessment section for further patient specific details. If patient discharges prior to next session this note will serve as a discharge summary. Please see below for the latest assessment towards goals. Assessment   Performance deficits / Impairments: Decreased functional mobility ; Decreased strength;Decreased endurance;Decreased ADL status; Decreased safe awareness;Decreased high-level IADLs;Decreased balance  Assessment: 64 y/o female admit 10/16/2020 with Enlarging Asymptomatic AAA. 10/16/2020 S/P Open Repair AAA. PMH as noted including CAD, COPD, AAA, PVD, L Fem-Tib Bypass, Hernia Repair. PTA pt lived at home alone and was independent with ADls and functional mobility. Today, pt completed functional mobility around room/bathroom with Rw and CGA. Pt slow with mobility but no major LOB. Pt with decreased standing endurance for ADLs. Anticipate pt will require up to max A for LB ADLs based on pain. Pt is functioning below baseline and will benefit from skilled therapy at this time. Pt reports a friend can provide 24 hour assist at discharge, however pt will benefit from skilled therapy to maximize independence.   Prognosis: Good  Decision Making: Medium Complexity  OT Education: OT Role;Plan of Care;Transfer Training;IADL Safety  REQUIRES OT FOLLOW UP: Yes  Activity Tolerance  Activity Tolerance: Patient limited by fatigue;Patient limited by pain  Safety Devices  Safety Devices in place: Yes  Type of devices: Left in chair;Call light within reach; Chair alarm in place;Gait belt;Nurse notified           Patient Diagnosis(es): There were no encounter diagnoses. has a past medical history of Arthritis, Asthma, Back pain, CAD (coronary artery disease), Cellulitis, COPD (chronic obstructive pulmonary disease) ([facility] [address] Depression, Diabetes mellitus ([facility] [address] Enlarging abdominal aortic aneurysm (AAA) ([facility] [address] History of femoral angiogram, Hyperlipidemia, Hypertension, and PVD (peripheral vascular disease) ([facility] [address] has a past surgical history that includes Cholecystectomy; hernia repair; other surgical history (Left, 9-); vascular surgery (Left); Abscess Drainage (Right, 2/19/16); Cardiac catheterization; and Abdominal aortic aneurysm repair (N/A, 10/16/2020). Restrictions  Restrictions/Precautions  Restrictions/Precautions: Fall Risk  Position Activity Restriction  Other position/activity restrictions: Diet : Clear Liquid. 4L O2 via NC    Subjective   General  Chart Reviewed: Yes  Patient assessed for rehabilitation services?: Yes  Additional Pertinent Hx: 64 y/o female admit 10/16/2020 with Enlarging Asymptomatic AAA. 10/16/2020 S/P Open Repair AAA. PMH as noted including CAD, COPD, AAA, PVD, L Fem-Tib Bypass, Hernia Repair. Family / Caregiver Present: No  Referring Practitioner: Yun Peng MD  Subjective  Subjective: Pt seen bedside and agreeable to therapy. General Comment  Comments: Per RN ok for therapy.     Social/Functional History  Social/Functional History  Lives With: Alone  Type of Home: (Condo.)  Home Layout: One level(7 steps with HR to access condo.)  Home Access: Stairs to enter without rails(1+1 step to enter building.)  Bathroom Shower/Tub: Tub/Shower unit  Bathroom Toilet: Standard  Bathroom Equipment: (No DME.)  Home Equipment: Rolling walker  ADL Assistance: Independent  Homemaking Assistance: Independent  Ambulation Assistance: Independent(Without assist device pta.)  Transfer Assistance: Independent  Active : Yes  Type of occupation: Works as a . Leisure & Hobbies: plays darts 2 days/week, staying active  Additional Comments: Son does not live close, limited ability to assist.  Pt reports supportive friends/neighbors. Objective   Vision: Within Functional Limits  Hearing: Within functional limits    Orientation  Overall Orientation Status: Within Functional Limits     Balance  Sitting Balance: Stand by assistance  Standing Balance: Contact guard assistance  Standing Balance  Time: stood prn for toileting  Functional Mobility  Functional Mobility Comments: bed > bathroom > chair with RW and CGA. Slow/guarded. Toilet Transfers  Toilet - Technique: Ambulating  Equipment Used: Standard toilet  Toilet Transfer: Contact guard assistance  Toilet Transfers Comments: grab bar support     ADL  Toileting: Contact guard assistance  Additional Comments: Anticipate pt will be max A for LB bathing/dressing, SBA for UB bathing/dressing and grooming when seated based on balance and endurance observed        Bed mobility  Supine to Sit: Contact guard assistance(HOB elevated, use of bed rail, increased time)  Sit to Supine: (pt in chair at end of session, alarm activated)  Transfers  Sit to stand: Contact guard assistance  Stand to sit: Contact guard assistance  Transfer Comments: to/from RW     Cognition  Overall Cognitive Status: Exceptions  Arousal/Alertness: Delayed responses to stimuli  Following Commands:  Follows one step commands consistently  Attention Span: Attends with cues to redirect  Insights: Decreased awareness of deficits  Initiation: Requires cues for some  Sequencing: Requires cues for some  Perception  Overall Perceptual Status: WFL     Sensation  Overall Sensation Status: WFL        LUE AROM (degrees)  LUE AROM : WFL  Left Hand AROM (degrees)  Left Hand AROM: WFL  RUE AROM (degrees)  RUE AROM : WFL  Right Hand AROM (degrees)  Right Hand AROM: WFL       Plan   Plan  Times per week: 3-5  Current Treatment Recommendations: Strengthening, Gait Training, Functional Mobility Training, Endurance Training, Balance Training, Self-Care / ADL, Neuromuscular Re-education    AM-PAC Score  AM-PAC Inpatient Daily Activity Raw Score: 16 (10/19/20 1549)  AM-PAC Inpatient ADL T-Scale Score : 35.96 (10/19/20 1549)  ADL Inpatient CMS 0-100% Score: 53.32 (10/19/20 1549)  ADL Inpatient CMS G-Code Modifier : CK (10/19/20 1549)    Goals  Short term goals  Time Frame for Short term goals: Prior to DC: Short term goal 1: Pt will complete ADL transfers/mobility with supervision  Short term goal 2: Pt will tolerate standing > 5 min for functional task with supervision  Short term goal 3: Pt will complete LB dressing with SBA  Short term goal 4: Pt will complete toileting with SBA  Long term goals  Time Frame for Long term goals : stgs=ltgs  Patient Goals   Patient goals : to return home       Therapy Time   Individual Concurrent Group Co-treatment   Time In 6419         Time Out 1545         Minutes 30         Timed Code Treatment Minutes: 15 Minutes     This note to serve as OT d/c summary if pt is d/c-ed prior to next therapy session.     Shahid Garcia OTR/L

## 2020-10-19 NOTE — PROGRESS NOTES
Pt with repeat K of 3.3. Per protocol, hung bag 1 of 2 of potassium. Dr. Jose D Powers paged to make aware.   Electronically signed by Elroy King RN on 10/19/2020 at 6:47 PM  ]

## 2020-10-20 LAB
ANION GAP SERPL CALCULATED.3IONS-SCNC: 6 MMOL/L (ref 3–16)
BUN BLDV-MCNC: 10 MG/DL (ref 7–20)
CALCIUM SERPL-MCNC: 8.6 MG/DL (ref 8.3–10.6)
CHLORIDE BLD-SCNC: 101 MMOL/L (ref 99–110)
CO2: 31 MMOL/L (ref 21–32)
CREAT SERPL-MCNC: 0.7 MG/DL (ref 0.6–1.2)
GFR AFRICAN AMERICAN: >60
GFR NON-AFRICAN AMERICAN: >60
GLUCOSE BLD-MCNC: 102 MG/DL (ref 70–99)
GLUCOSE BLD-MCNC: 106 MG/DL (ref 70–99)
GLUCOSE BLD-MCNC: 108 MG/DL (ref 70–99)
GLUCOSE BLD-MCNC: 111 MG/DL (ref 70–99)
GLUCOSE BLD-MCNC: 129 MG/DL (ref 70–99)
GLUCOSE BLD-MCNC: 145 MG/DL (ref 70–99)
GLUCOSE BLD-MCNC: 196 MG/DL (ref 70–99)
HCT VFR BLD CALC: 34.1 % (ref 36–48)
HEMOGLOBIN: 11.3 G/DL (ref 12–16)
MCH RBC QN AUTO: 29.8 PG (ref 26–34)
MCHC RBC AUTO-ENTMCNC: 33.1 G/DL (ref 31–36)
MCV RBC AUTO: 90.1 FL (ref 80–100)
PDW BLD-RTO: 14.6 % (ref 12.4–15.4)
PERFORMED ON: ABNORMAL
PLATELET # BLD: 160 K/UL (ref 135–450)
PMV BLD AUTO: 8.1 FL (ref 5–10.5)
POTASSIUM SERPL-SCNC: 3.5 MMOL/L (ref 3.5–5.1)
RBC # BLD: 3.79 M/UL (ref 4–5.2)
SODIUM BLD-SCNC: 138 MMOL/L (ref 136–145)
WBC # BLD: 8.4 K/UL (ref 4–11)

## 2020-10-20 PROCEDURE — 6370000000 HC RX 637 (ALT 250 FOR IP): Performed by: SPECIALIST

## 2020-10-20 PROCEDURE — 97110 THERAPEUTIC EXERCISES: CPT | Performed by: PHYSICAL THERAPIST

## 2020-10-20 PROCEDURE — 1200000000 HC SEMI PRIVATE

## 2020-10-20 PROCEDURE — 36415 COLL VENOUS BLD VENIPUNCTURE: CPT

## 2020-10-20 PROCEDURE — 6370000000 HC RX 637 (ALT 250 FOR IP): Performed by: SURGERY

## 2020-10-20 PROCEDURE — 80048 BASIC METABOLIC PNL TOTAL CA: CPT

## 2020-10-20 PROCEDURE — 85027 COMPLETE CBC AUTOMATED: CPT

## 2020-10-20 PROCEDURE — 99024 POSTOP FOLLOW-UP VISIT: CPT | Performed by: SURGERY

## 2020-10-20 PROCEDURE — 2580000003 HC RX 258: Performed by: SURGERY

## 2020-10-20 PROCEDURE — 97530 THERAPEUTIC ACTIVITIES: CPT | Performed by: PHYSICAL THERAPIST

## 2020-10-20 PROCEDURE — 97116 GAIT TRAINING THERAPY: CPT | Performed by: PHYSICAL THERAPIST

## 2020-10-20 RX ORDER — POLYETHYLENE GLYCOL 3350 17 G/17G
17 POWDER, FOR SOLUTION ORAL DAILY
Status: DISCONTINUED | OUTPATIENT
Start: 2020-10-20 | End: 2020-10-23 | Stop reason: HOSPADM

## 2020-10-20 RX ORDER — POTASSIUM CHLORIDE 20 MEQ/1
20 TABLET, EXTENDED RELEASE ORAL 2 TIMES DAILY WITH MEALS
Status: COMPLETED | OUTPATIENT
Start: 2020-10-20 | End: 2020-10-21

## 2020-10-20 RX ORDER — DOCUSATE SODIUM 100 MG/1
100 CAPSULE, LIQUID FILLED ORAL DAILY
Status: DISCONTINUED | OUTPATIENT
Start: 2020-10-20 | End: 2020-10-23 | Stop reason: HOSPADM

## 2020-10-20 RX ADMIN — OXYCODONE HYDROCHLORIDE AND ACETAMINOPHEN 1 TABLET: 5; 325 TABLET ORAL at 12:30

## 2020-10-20 RX ADMIN — SODIUM CHLORIDE, PRESERVATIVE FREE 10 ML: 5 INJECTION INTRAVENOUS at 22:04

## 2020-10-20 RX ADMIN — AMLODIPINE BESYLATE 10 MG: 10 TABLET ORAL at 21:03

## 2020-10-20 RX ADMIN — SODIUM CHLORIDE, PRESERVATIVE FREE 10 ML: 5 INJECTION INTRAVENOUS at 08:47

## 2020-10-20 RX ADMIN — AMLODIPINE BESYLATE 10 MG: 10 TABLET ORAL at 08:46

## 2020-10-20 RX ADMIN — POTASSIUM CHLORIDE 20 MEQ: 20 TABLET, EXTENDED RELEASE ORAL at 08:47

## 2020-10-20 RX ADMIN — POTASSIUM CHLORIDE 20 MEQ: 20 TABLET, EXTENDED RELEASE ORAL at 17:14

## 2020-10-20 RX ADMIN — OXYCODONE HYDROCHLORIDE AND ACETAMINOPHEN 2 TABLET: 5; 325 TABLET ORAL at 22:07

## 2020-10-20 RX ADMIN — METFORMIN HYDROCHLORIDE 500 MG: 500 TABLET ORAL at 08:47

## 2020-10-20 RX ADMIN — METFORMIN HYDROCHLORIDE 500 MG: 500 TABLET ORAL at 17:13

## 2020-10-20 RX ADMIN — PRAVASTATIN SODIUM 40 MG: 40 TABLET ORAL at 08:46

## 2020-10-20 RX ADMIN — DOCUSATE SODIUM 100 MG: 100 CAPSULE ORAL at 08:46

## 2020-10-20 RX ADMIN — INSULIN LISPRO 2 UNITS: 100 INJECTION, SOLUTION INTRAVENOUS; SUBCUTANEOUS at 17:14

## 2020-10-20 RX ADMIN — OXYCODONE HYDROCHLORIDE AND ACETAMINOPHEN 1 TABLET: 5; 325 TABLET ORAL at 17:13

## 2020-10-20 RX ADMIN — POLYETHYLENE GLYCOL 3350 17 G: 17 POWDER, FOR SOLUTION ORAL at 17:14

## 2020-10-20 RX ADMIN — APIXABAN 5 MG: 5 TABLET, FILM COATED ORAL at 08:46

## 2020-10-20 ASSESSMENT — PAIN DESCRIPTION - PAIN TYPE
TYPE: ACUTE PAIN
TYPE: ACUTE PAIN

## 2020-10-20 ASSESSMENT — PAIN DESCRIPTION - ORIENTATION
ORIENTATION: MID;LOWER
ORIENTATION: MID;LOWER

## 2020-10-20 ASSESSMENT — PAIN SCALES - GENERAL
PAINLEVEL_OUTOF10: 6
PAINLEVEL_OUTOF10: 5
PAINLEVEL_OUTOF10: 0
PAINLEVEL_OUTOF10: 6
PAINLEVEL_OUTOF10: 0
PAINLEVEL_OUTOF10: 0
PAINLEVEL_OUTOF10: 3

## 2020-10-20 ASSESSMENT — PAIN DESCRIPTION - FREQUENCY
FREQUENCY: CONTINUOUS
FREQUENCY: CONTINUOUS

## 2020-10-20 ASSESSMENT — PAIN DESCRIPTION - LOCATION
LOCATION: BACK
LOCATION: BACK

## 2020-10-20 ASSESSMENT — PAIN DESCRIPTION - DESCRIPTORS
DESCRIPTORS: ACHING;SORE
DESCRIPTORS: ACHING;SORE

## 2020-10-20 ASSESSMENT — PAIN DESCRIPTION - PROGRESSION
CLINICAL_PROGRESSION: NOT CHANGED
CLINICAL_PROGRESSION: NOT CHANGED

## 2020-10-20 ASSESSMENT — PAIN DESCRIPTION - ONSET
ONSET: ON-GOING
ONSET: ON-GOING

## 2020-10-20 NOTE — CARE COORDINATION
SW met with pt per her request.  Pt asked about SNF options. List provided but after talking about SNF pt prefers to go home at dc with home care. Prefers [facility]. Pts DIL will be staying with her 24/7 at dc.   Pt is not currently on O2 at home  Electronically signed by [id number] CALI Hicks on 10/20/2020 at 2:16 PM

## 2020-10-20 NOTE — PLAN OF CARE
Problem: Nutrition  Goal: Optimal nutrition therapy  10/20/2020 1019 by Eula Guadalupe RD, LD  Outcome: Ongoing  10/20/2020 0140 by Buster Fierro  Outcome: Ongoing   Nutrition Problem #1: Inadequate oral intake(resolving, not intubated anymore)  Intervention: Food and/or Nutrient Delivery: Continue Current Diet  Nutritional Goals: tolerate most appropriate form of nutrition

## 2020-10-20 NOTE — PROGRESS NOTES
Comprehensive Nutrition Assessment    Type and Reason for Visit:  Reassess    Nutrition Recommendations/Plan:   Continue Carb control diet (5CHO/meal). Nutrition Assessment:  Pt extubated s/p AAA repair. Diet advanced this AM to Carb control (5CHO/meal). Anticipating D/C soon. Will monitor PO intakes. Malnutrition Assessment:  Malnutrition Status:  Insufficient data      Estimated Daily Nutrient Needs:  Energy (kcal):  5231-9507 (15-18 x ABW 91 kg)    Protein (g):   (1.2-2 x ABW)     Fluid (ml/day):  1 ml per kcal     Nutrition Related Findings:  BM 10/16; no edema      Wounds:  Surgical Wound(abd)       Current Nutrition Therapies:    DIET CARB CONTROL; Carb Control: 5 carb choices (75 gms)/meal    Anthropometric Measures:  · Height: 5' 5\" (165.1 cm)  · Current Body Weight: 199 lb (90.3 kg)    · Ideal Body Weight: 125 lbs; % Ideal Body Weight 159.2 %   · BMI: 33.1  · BMI Categories: Obese Class 1 (BMI 30.0-34. 9)       Nutrition Diagnosis:   · Inadequate oral intake(resolving, not intubated anymore) related to impaired respiratory function as evidenced by intubation      Nutrition Interventions:   Food and/or Nutrient Delivery:  Continue Current Diet  Nutrition Education/Counseling:  No recommendation at this time   Coordination of Nutrition Care:  Continued Inpatient Monitoring    Goals:  tolerate most appropriate form of nutrition       Nutrition Monitoring and Evaluation:   Food/Nutrient Intake Outcomes:  Food and Nutrient Intake  Physical Signs/Symptoms Outcomes:  Biochemical Data, Constipation, Diarrhea, Fluid Status or Edema, Weight, Skin, Nutrition Focused Physical Findings, Hemodynamic Status     Discharge Planning:     Too soon to determine     Electronically signed by Geovany Cervantes RD, LD on 10/20/20 at 10:19 AM EDT    Contact: [phone]

## 2020-10-20 NOTE — PROGRESS NOTES
Patient alert and oriented x4, VSS, sitting up in bed. Patient denies n/v, diarrhea, SOB, pain. Abdominal incision intact without s/s of infection. Patient denies needs at this time. Bed in lowest position, bed alarm on, call light within reach. Will continue to monitor pt needs.

## 2020-10-20 NOTE — PROGRESS NOTES
VASCULAR                 POD#4     Overall feeling better bit frustrated with RN staff. ++ flatus     VSS          afeb                           Lungs clear  Abd soft with active BS; no distention  Incision intact  B feet warm; palpable L DP pulse     Labs   reviewed                    CXR reviewed          A/P:     S/P open AAA repair              YCDBO well.              Renal function preserved without any ATN.               Regular carb control diet              Supplement K po              Wean O2 to keep sat > 89%              Up with PT/OT              Likely DC tomorrow.                 Geronimo Espinal

## 2020-10-20 NOTE — PROGRESS NOTES
as well, so anticipate less burden of care if patient continues acute hospital stay. PT will continue to see patient while on acute and monitor pt's progress closely. Treatment Diagnosis: impaired functional mobility due to recent abdominal surgery  Prognosis: Good  Decision Making: Medium Complexity  History: 64 y/o female admit 10/16/2020 with Enlarging Asymptomatic AAA. 10/16/2020 S/P Open Repair AAA. PMH as noted including CAD, COPD, AAA, PVD, L Fem-Tib Bypass, Hernia Repair. PT Education: General Safety; Functional Mobility Training;Gait Training  Patient Education: need for monitoring blood sugars, increased blood sugar levels when body is under stress from recent surgery/hospitalization  REQUIRES PT FOLLOW UP: Yes  Activity Tolerance  Activity Tolerance: Patient Tolerated treatment well  Activity Tolerance: able to participate without much encouragement required     Patient Diagnosis(es): There were no encounter diagnoses. has a past medical history of Arthritis, Asthma, Back pain, CAD (coronary artery disease), Cellulitis, COPD (chronic obstructive pulmonary disease) ([address] Depression, Diabetes mellitus ([address] Enlarging abdominal aortic aneurysm (AAA) ([address] History of femoral angiogram, Hyperlipidemia, Hypertension, and PVD (peripheral vascular disease) ([address] has a past surgical history that includes Cholecystectomy; hernia repair; other surgical history (Left, 9-); vascular surgery (Left); Abscess Drainage (Right, 2/19/16); Cardiac catheterization; and Abdominal aortic aneurysm repair (N/A, 10/16/2020). Restrictions  Restrictions/Precautions  Restrictions/Precautions: Fall Risk  Position Activity Restriction  Other position/activity restrictions: Diet :increased - carb control with blood sugar checks 4 times/day. 2L O2 via NC  Subjective   General  Chart Reviewed: Yes  Additional Pertinent Hx: 64 y/o female admit 10/16/2020 with Enlarging Asymptomatic AAA.   10/16/2020 S/P Open Repair AAA.  PMH as noted including CAD, COPD, AAA, PVD, L Fem-Tib Bypass, Hernia Repair. Response To Previous Treatment: Patient reporting fatigue but able to participate. Referring Practitioner: Dr. Blake Foote  Subjective  Subjective: Patient initially asked PT to return since just removed central line about 15 minutes prior. PT returned an hour later and patient was agreeable to therapy. She complains of lower back pain at 7/10 and requested pain medication - called for nursing. Patient expressed frustration with need to have blood sugars taken so frequently. Orientation  Orientation  Overall Orientation Status: Within Functional Limits  Cognition   Cognition  Overall Cognitive Status: WFL  Objective   Bed mobility  Supine to Sit: Minimal assistance  Comment: requested assist to sit up from elevated bed due to back pain - reports that her daughter-in-law will be able to assist her as needed and will be able to stay with her upon discharge  Transfers  Sit to Stand: Contact guard assistance;Stand by assistance  Stand to sit: Contact guard assistance;Stand by assistance  Comment: cues for hand placement and walker safety, especially with turning and backing up - seems unfamiliar with walker  Ambulation  Ambulation?: Yes  Ambulation 1  Surface: level tile  Device: Rolling Walker  Assistance: Contact guard assistance;Stand by assistance  Distance: Pt amb 75' with wheeled walker,  cues to remain close/safe distance to walker especially with turning and backing up, Diminished step length/clearance; no LE buckling/giving way. Increase cues with transitional activities. Improved mobility with less guarding noted.   Comments: Reports getting up to the bathroom on her own earlier with wheeled walker, has O2 at 2L so paced back and forth in patient's room with several turns and cues for safety with PT managing O2 tubing                  Other Activities: Other (see comment)  Comment: Assisted patient to call so she could

## 2020-10-20 NOTE — PROGRESS NOTES
0200 blood sugar 196. Pt refusing insulin at this time, stating, \"I've been stuck too many times. \" Seems agreeable to 0600 insulin. Will continue to monitor.

## 2020-10-20 NOTE — PROCEDURES
Spirometry was acceptable and reproducible by ATS standards      Spirometry/Flow volume loop:    Spirometry and flow volume loops consistent with moderately severe airflow obstruction. FEV1 of 53%, and FVC 60%. Lung volumes:    Lung volumes show mild restriction. Diffusing capacity:  DLCO is  reduced, (not corrected for hemoglobin)    Summary:  There is some degree of airflow obstruction, while spirometry suggests moderately severe obstruction this could be falsely influenced by mild concomitant restriction. She has a reduced diffusing capacity, in the setting of mild restriction cannot rule out interstitial lung disease. Clinical correlation advised    TLC %Pred   Date Value Ref Range Status   10/09/2020 72 % Final           OBSTRUCTION % Predicted FEV1   MILD >70%   MODERATE 60-69%   MODERATELY-SEVERE 50-59%   SEVERE 35-49%   VERY SEVERE <35%         RESTRICTION % Predicted TLC   MILD 66-80%   MODERATE 54-65%   MODERATELY-SEVERE <54%                 DIFFUSION CAPACITY DLCO % Pred   MILD >60% AND < LLN   MODERATE 40-60%   SEVERE <40%       PFT data will be scanned into the media tab under this encounter. Please see the scanned data for numerical values.      Eron Moe MD  [facility] Pulmonary, Sleep and Critical Care Medicine

## 2020-10-20 NOTE — PROGRESS NOTES
Pt refusing lab draw for repeat BMP post- potassium infusion. Cannot draw from IJ. Dr. Micaela Magana made aware. [id number]: per Dr. Micaela Magana, it is ok to wait until morning lab draw to collect BMP.

## 2020-10-20 NOTE — PROGRESS NOTES
1510- Order noted to remove central line/cordis cath. pt in bed. Pt instructed on valsalva maneuver, pt demonstrated proper technique. Sutures removed, pressure held to site. Pt perform valsalva, line removed without complications. No bleeding noted, DSD dressing applied with vaseline gauze.

## 2020-10-20 NOTE — PLAN OF CARE
Problem: Nutrition  Goal: Optimal nutrition therapy  Outcome: Ongoing     Problem: Falls - Risk of:  Goal: Will remain free from falls  Description: Will remain free from falls  Outcome: Ongoing  Goal: Absence of physical injury  Description: Absence of physical injury  Outcome: Ongoing     Problem: Skin Integrity:  Goal: Will show no infection signs and symptoms  Description: Will show no infection signs and symptoms  Outcome: Ongoing  Goal: Absence of new skin breakdown  Description: Absence of new skin breakdown  Outcome: Ongoing     Problem: Pain:  Goal: Pain level will decrease  Description: Pain level will decrease  Outcome: Ongoing  Goal: Control of acute pain  Description: Control of acute pain  Outcome: Ongoing  Goal: Control of chronic pain  Description: Control of chronic pain  Outcome: Ongoing

## 2020-10-20 NOTE — PROGRESS NOTES
Department of Internal Medicine  General Internal Medicine  Attending Progress Note      SUBJECTIVE:  Pt was transferred to med floor. c/o too many blood draws,  No BM, doing OK , walking. Afebrile.      OBJECTIVE      Medications    Current Facility-Administered Medications: docusate sodium (COLACE) capsule 100 mg, 100 mg, Oral, Daily  apixaban (ELIQUIS) tablet 5 mg, 5 mg, Oral, BID  oxyCODONE-acetaminophen (PERCOCET) 5-325 MG per tablet 1 tablet, 1 tablet, Oral, Q4H PRN **OR** oxyCODONE-acetaminophen (PERCOCET) 5-325 MG per tablet 2 tablet, 2 tablet, Oral, Q4H PRN  potassium chloride 20 mEq/50 mL IVPB (Central Line), 20 mEq, Intravenous, Once  benzonatate (TESSALON) capsule 100 mg, 100 mg, Oral, TID PRN  amLODIPine (NORVASC) tablet 10 mg, 10 mg, Oral, BID  pravastatin (PRAVACHOL) tablet 40 mg, 40 mg, Oral, Daily  hydrALAZINE (APRESOLINE) injection 10 mg, 10 mg, Intravenous, Q6H PRN  glucose (GLUTOSE) 40 % oral gel 15 g, 15 g, Oral, PRN  dextrose 50 % IV solution, 12.5 g, Intravenous, PRN  glucagon (rDNA) injection 1 mg, 1 mg, Intramuscular, PRN  dextrose 5 % solution, 100 mL/hr, Intravenous, PRN  morphine (PF) injection 2 mg, 2 mg, Intravenous, Q1H PRN **OR** morphine (PF) injection 4 mg, 4 mg, Intravenous, Q2H PRN  sodium chloride flush 0.9 % injection 10 mL, 10 mL, Intravenous, 2 times per day  sodium chloride flush 0.9 % injection 10 mL, 10 mL, Intravenous, PRN  promethazine (PHENERGAN) tablet 12.5 mg, 12.5 mg, Oral, Q6H PRN **OR** ondansetron (ZOFRAN) injection 4 mg, 4 mg, Intravenous, Q6H PRN  famotidine (PEPCID) injection 20 mg, 20 mg, Intravenous, BID  insulin lispro (HUMALOG) injection vial 0-12 Units, 0-12 Units, Subcutaneous, Q4H  potassium chloride 20 mEq/50 mL IVPB (Central Line), 20 mEq, Intravenous, PRN  Physical    VITALS:  BP (!) 141/69   Pulse 83   Temp 98 °F (36.7 °C) (Oral)   Resp 16   Ht 5' 5\" (1.651 m)   Wt 199 lb 8.3 oz (90.5 kg)   SpO2 96%   BMI 33.20 kg/m²   CONSTITUTIONAL:  awake, alert, cooperative, no apparent distress, and appears stated age  LUNGS:  No increased work of breathing, good air exchange, clear to auscultation bilaterally, no crackles or wheezing  CARDIOVASCULAR:  Normal apical impulse, regular rate and rhythm, normal S1 and S2, no S3 or S4, and no murmur noted  ABDOMEN:  No scars, normal bowel sounds, soft, non-distended, non-tender, no masses palpated, no hepatosplenomegally  MUSCULOSKELETAL:  There is no redness, warmth, or swelling of the joints. Full range of motion noted. Motor strength is 5 out of 5 all extremities bilaterally. Tone is normal.  Data    CBC:   Lab Results   Component Value Date    WBC 8.4 10/20/2020    RBC 3.79 10/20/2020    HGB 11.3 10/20/2020    HCT 34.1 10/20/2020    MCV 90.1 10/20/2020    MCH 29.8 10/20/2020    MCHC 33.1 10/20/2020    RDW 14.6 10/20/2020     10/20/2020    MPV 8.1 10/20/2020     BMP:    Lab Results   Component Value Date     10/19/2020    K 3.3 10/19/2020    K 4.0 06/23/2020     10/19/2020    CO2 26 10/19/2020    BUN 10 10/19/2020    LABALBU 3.9 06/23/2020    CREATININE 0.7 10/19/2020    CALCIUM 8.6 10/19/2020    GFRAA >60 10/19/2020    GFRAA >60 03/15/2011    LABGLOM >60 10/19/2020    GLUCOSE 129 10/19/2020       ASSESSMENT AND PLAN      Active Problems:  1. S/p AAA aneurysm repair, post op 4.  f/u by dr Monalisa Acuña. Hemodynamically stable, NG is out.  Extubated. On Ice chips. cont PT/OT,  Consider advance diet. 2.HTN cont med,   3. DM cont meds. Sliding scale ins.coverage. 4. PAD stable,  5. Mild sore throat, reassurance,  6. Hypokalemia,on supplement,improving. 7. Hx of smoking, has cough, get Tessalon pearles.   8.No BM on stool softener,  Dr Lilli guadarrama

## 2020-10-21 PROCEDURE — 6370000000 HC RX 637 (ALT 250 FOR IP): Performed by: SURGERY

## 2020-10-21 PROCEDURE — 94760 N-INVAS EAR/PLS OXIMETRY 1: CPT

## 2020-10-21 PROCEDURE — 6360000002 HC RX W HCPCS: Performed by: INTERNAL MEDICINE

## 2020-10-21 PROCEDURE — 2580000003 HC RX 258: Performed by: SURGERY

## 2020-10-21 PROCEDURE — 97530 THERAPEUTIC ACTIVITIES: CPT

## 2020-10-21 PROCEDURE — 2700000000 HC OXYGEN THERAPY PER DAY

## 2020-10-21 PROCEDURE — 99223 1ST HOSP IP/OBS HIGH 75: CPT | Performed by: INTERNAL MEDICINE

## 2020-10-21 PROCEDURE — 1200000000 HC SEMI PRIVATE

## 2020-10-21 PROCEDURE — 6370000000 HC RX 637 (ALT 250 FOR IP): Performed by: SPECIALIST

## 2020-10-21 PROCEDURE — 99024 POSTOP FOLLOW-UP VISIT: CPT | Performed by: SURGERY

## 2020-10-21 PROCEDURE — 97116 GAIT TRAINING THERAPY: CPT

## 2020-10-21 PROCEDURE — 94640 AIRWAY INHALATION TREATMENT: CPT

## 2020-10-21 RX ORDER — IPRATROPIUM BROMIDE AND ALBUTEROL SULFATE 2.5; .5 MG/3ML; MG/3ML
1 SOLUTION RESPIRATORY (INHALATION)
Status: DISCONTINUED | OUTPATIENT
Start: 2020-10-21 | End: 2020-10-23 | Stop reason: HOSPADM

## 2020-10-21 RX ORDER — FUROSEMIDE 10 MG/ML
20 INJECTION INTRAMUSCULAR; INTRAVENOUS ONCE
Status: COMPLETED | OUTPATIENT
Start: 2020-10-21 | End: 2020-10-21

## 2020-10-21 RX ADMIN — DOCUSATE SODIUM 100 MG: 100 CAPSULE ORAL at 08:13

## 2020-10-21 RX ADMIN — OXYCODONE HYDROCHLORIDE AND ACETAMINOPHEN 2 TABLET: 5; 325 TABLET ORAL at 21:18

## 2020-10-21 RX ADMIN — APIXABAN 5 MG: 5 TABLET, FILM COATED ORAL at 21:19

## 2020-10-21 RX ADMIN — AMLODIPINE BESYLATE 10 MG: 10 TABLET ORAL at 08:12

## 2020-10-21 RX ADMIN — IPRATROPIUM BROMIDE AND ALBUTEROL SULFATE 1 AMPULE: .5; 3 SOLUTION RESPIRATORY (INHALATION) at 17:06

## 2020-10-21 RX ADMIN — IPRATROPIUM BROMIDE AND ALBUTEROL SULFATE 1 AMPULE: .5; 3 SOLUTION RESPIRATORY (INHALATION) at 21:37

## 2020-10-21 RX ADMIN — OXYCODONE HYDROCHLORIDE AND ACETAMINOPHEN 1 TABLET: 5; 325 TABLET ORAL at 08:13

## 2020-10-21 RX ADMIN — POTASSIUM CHLORIDE 20 MEQ: 20 TABLET, EXTENDED RELEASE ORAL at 17:03

## 2020-10-21 RX ADMIN — SODIUM CHLORIDE, PRESERVATIVE FREE 10 ML: 5 INJECTION INTRAVENOUS at 08:14

## 2020-10-21 RX ADMIN — APIXABAN 5 MG: 5 TABLET, FILM COATED ORAL at 08:12

## 2020-10-21 RX ADMIN — IPRATROPIUM BROMIDE AND ALBUTEROL SULFATE 1 AMPULE: .5; 3 SOLUTION RESPIRATORY (INHALATION) at 12:08

## 2020-10-21 RX ADMIN — AMLODIPINE BESYLATE 10 MG: 10 TABLET ORAL at 21:20

## 2020-10-21 RX ADMIN — Medication 10 ML: at 15:54

## 2020-10-21 RX ADMIN — FUROSEMIDE 20 MG: 10 INJECTION, SOLUTION INTRAMUSCULAR; INTRAVENOUS at 15:54

## 2020-10-21 RX ADMIN — METFORMIN HYDROCHLORIDE 500 MG: 500 TABLET ORAL at 17:03

## 2020-10-21 RX ADMIN — SODIUM CHLORIDE, PRESERVATIVE FREE 10 ML: 5 INJECTION INTRAVENOUS at 21:20

## 2020-10-21 RX ADMIN — IPRATROPIUM BROMIDE AND ALBUTEROL SULFATE 1 AMPULE: .5; 3 SOLUTION RESPIRATORY (INHALATION) at 09:22

## 2020-10-21 RX ADMIN — POTASSIUM CHLORIDE 20 MEQ: 20 TABLET, EXTENDED RELEASE ORAL at 08:13

## 2020-10-21 RX ADMIN — METFORMIN HYDROCHLORIDE 500 MG: 500 TABLET ORAL at 08:13

## 2020-10-21 RX ADMIN — PRAVASTATIN SODIUM 40 MG: 40 TABLET ORAL at 08:13

## 2020-10-21 RX ADMIN — POLYETHYLENE GLYCOL 3350 17 G: 17 POWDER, FOR SOLUTION ORAL at 08:12

## 2020-10-21 RX ADMIN — OXYCODONE HYDROCHLORIDE AND ACETAMINOPHEN 1 TABLET: 5; 325 TABLET ORAL at 13:58

## 2020-10-21 ASSESSMENT — PAIN DESCRIPTION - PAIN TYPE
TYPE: ACUTE PAIN
TYPE: ACUTE PAIN

## 2020-10-21 ASSESSMENT — PAIN DESCRIPTION - ORIENTATION
ORIENTATION: LOWER;MID
ORIENTATION: LEFT;MID

## 2020-10-21 ASSESSMENT — PAIN DESCRIPTION - ONSET
ONSET: ON-GOING
ONSET: ON-GOING

## 2020-10-21 ASSESSMENT — PAIN DESCRIPTION - PROGRESSION
CLINICAL_PROGRESSION: NOT CHANGED
CLINICAL_PROGRESSION: NOT CHANGED

## 2020-10-21 ASSESSMENT — PAIN SCALES - GENERAL
PAINLEVEL_OUTOF10: 7
PAINLEVEL_OUTOF10: 2
PAINLEVEL_OUTOF10: 6
PAINLEVEL_OUTOF10: 6

## 2020-10-21 ASSESSMENT — PAIN DESCRIPTION - DESCRIPTORS
DESCRIPTORS: ACHING;SORE
DESCRIPTORS: ACHING;SORE

## 2020-10-21 ASSESSMENT — PAIN DESCRIPTION - LOCATION
LOCATION: BACK
LOCATION: BACK

## 2020-10-21 ASSESSMENT — PAIN DESCRIPTION - FREQUENCY
FREQUENCY: CONTINUOUS
FREQUENCY: CONTINUOUS

## 2020-10-21 NOTE — PROGRESS NOTES
VASCULAR                 POD#5     Overall feeling better. ++ flatus  Tolerated some regular food yesterday but poor appetite     VSS          afeb       O2 sat >90% on 1 L O2  Placed on RA and sat in 88-89% range                 Lungs clear  Abd soft with active BS; no distention  Incision intact  B feet warm; palpable L DP pulse          A/P:     S/P open AAA repair              XKBLC well.              Renal function preserved without any ATN.             Regular carb control diet              Supplement K po              Wean O2 to keep sat > 89%              Up with PT/OT              COPD with hypoxia - will have Dr. Mikayla Beckham at al see for pulmonary evaluation prior to DC.                  Geronimo Garrett

## 2020-10-21 NOTE — PROGRESS NOTES
Physical Therapy    Facility/Department: [address] MED SURG  Daily Note  If patient discharges prior to next session this note will serve as a discharge summary. Please see below for the latest assessment towards goals. NAME: Anderson Shearer  : 1950  MRN: [id number]    Date of Service: 10/21/2020    Discharge Recommendations:  24 hour supervision or assist, Patient would benefit from continued therapy after discharge, S Level 1, Home with Home health PT   Anderson Shearer scored a 18/24 on the AM-PAC short mobility form. Current research shows that an AM-PAC score of 18 or greater is typically associated with a discharge to the patient's home setting. Based on the patient's AM-PAC score and their current functional mobility deficits, it is recommended that the patient have 2-3 sessions per week of Physical Therapy at d/c to increase the patient's independence. At this time, this patient demonstrates the endurance and safety to discharge home with initial 24/7 assist and home PT, level 1 (home vs OP services) and a follow up treatment frequency of 2-3x/wk. Please see assessment section for further patient specific details. PT Equipment Recommendations  Equipment Needed: No  Other: the pt reports she has a walker at home    Assessment   Assessment: Today, the pt presented as frustrated over not being allowed to walk more. She was c/o of pain in her abdomen and of not having a BM in several days. She required at least min A for supine > sit due to abd pain but was CGA/SBA for trasnfers and CGA/SBA for ambulation in the room with the walker. The pt had no LOB and managed the walker better today. Anticipate that the pt will con't to improve and be safe for home at d/c with initial 24/7 assist. The pt reports she has a walker at home. She may benefit from home PT at d/c depending on her status. Will con't to follow.   Prognosis: Good  PT Education: PT Role;General Safety;Transfer Training;Gait Training  Barriers to Learning: none  REQUIRES PT FOLLOW UP: Yes  Activity Tolerance  Activity Tolerance: Patient limited by pain; Patient limited by fatigue       Patient Diagnosis(es): There were no encounter diagnoses. has a past medical history of Arthritis, Asthma, Back pain, CAD (coronary artery disease), Cellulitis, COPD (chronic obstructive pulmonary disease) ([facility] Depression, Diabetes mellitus ([facility] Enlarging abdominal aortic aneurysm (AAA) ([facility] History of femoral angiogram, Hyperlipidemia, Hypertension, and PVD (peripheral vascular disease) ([facility] has a past surgical history that includes Cholecystectomy; hernia repair; other surgical history (Left, 9-); vascular surgery (Left); Abscess Drainage (Right, 2/19/16); Cardiac catheterization; and Abdominal aortic aneurysm repair (N/A, 10/16/2020). Restrictions  Restrictions/Precautions  Restrictions/Precautions: Fall Risk  Position Activity Restriction  Other position/activity restrictions: 2L O2, s/p abd sx for AAA repair    Subjective  General  Chart Reviewed: Yes  Additional Pertinent Hx: 66 y/o female admit 10/16/2020 with Enlarging Asymptomatic AAA. 10/16/2020 S/P Open Repair AAA. PMH as noted including CAD, COPD, AAA, PVD, L Fem-Tib Bypass, Hernia Repair. Response To Previous Treatment: Patient reporting fatigue but able to participate.   Family / Caregiver Present: No  Referring Practitioner: Dr. Yeimi Mike: asked by the nurse earlier today to see the pt because she wanted to walk; when this PT walked into the room the pt was found to be in the bed and c/o of poor nursing care today and not being walked as much as she would like; the pt reporting 6/10 pain in her abdomen today and her RN brought in pain meds          Orientation  Orientation  Overall Orientation Status: Within Functional Limits  Social/Functional History  Social/Functional History  Lives With: Alone  Type of Home: (Condo.)  Home Layout: One level(7 steps with HR to access condo.)  Home Access: Stairs to enter without rails(1+1 step to enter building.)  Bathroom Shower/Tub: Tub/Shower unit  Bathroom Toilet: Standard  Bathroom Equipment: (No DME.)  Home Equipment: Rolling walker  ADL Assistance: Independent  Homemaking Assistance: Independent  Ambulation Assistance: Independent(Without assist device pta.)  Transfer Assistance: Independent  Active : Yes  Type of occupation: Works as a . Leisure & Hobbies: plays darts 2 days/week, staying active  Additional Comments: Son does not live close, limited ability to assist.  Pt reports supportive friends/neighbors.   Cognition   Cognition  Overall Cognitive Status: WFL    Objective  Bed mobility  Supine to Sit: Minimal assistance;Contact guard assistance(increased difficulty turning to sit up due to abdomnial pain)  Sit to Supine: Unable to assess(the pt up to the chair at end of session)  Transfers  Sit to Stand: Contact guard assistance;Stand by assistance  Stand to sit: Contact guard assistance;Stand by assistance  Ambulation  Ambulation?: Yes  Ambulation 1  Surface: level tile  Device: Rolling Walker  Assistance: Contact guard assistance;Stand by assistance  Quality of Gait: slowed pace, flexed posture but able to manage the walker better today; no LOB  Distance: 10 feet into the bathroom and then 50 feet in the room; the pt declined another walk due to pain  Comments: the pt frustrated over not being allowed to get up on her own in the room; attempted to explain to the pt about our fall risk procedures; educated the pt to ask to walk with the PCA or nurse later on tonight     Balance  Posture: Fair  Sitting - Static: Good  Sitting - Dynamic: Good  Standing - Static: Good;-(stood atthe sink with SBA for brushing teeth and washing hands)  Standing - Dynamic: Good;-(with walker)        Plan   Plan  Times per week: 3-5x week  Current Treatment Recommendations: Functional Mobility Training, Transfer Training, Gait Training, Stair training, Safety Education & Training, Patient/Caregiver Education & Training, Equipment Evaluation, Education, & procurement  Plan Comment: apparently mariangel-in-law is able to stay with patient initially upon returning home, patient prefers to return home with assist from family rather than go to [place] [id number]  Type of devices: Call light within reach, Chair alarm in place, Gait belt, Patient at risk for falls, Left in chair, Nurse notified(Leena RN notified)      AM-PAC Score  AM-PAC Inpatient Mobility Raw Score : 18 (10/21/20 1427)  AM-PAC Inpatient T-Scale Score : 43.63 (10/21/20 1427)  Mobility Inpatient CMS 0-100% Score: 46.58 (10/21/20 1427)  Mobility Inpatient CMS G-Code Modifier : CK (10/21/20 1427)          Goals  Short term goals  Time Frame for Short term goals: Upon d/c acute care setting.  (progressing toward goals but limited due to pain)  Short term goal 1: Bed Mob Supervision. Short term goal 2: Transfers with/without assist device SBA. Short term goal 3: Amb with/without assist device 150' SBA. Short term goal 4: Stairs with/without assist device SBA/CGA. Patient Goals   Patient goals : Return home.        Therapy Time   Individual Concurrent Group Co-treatment   Time In 7061         Time Out 1440         Minutes 47                 Electronically signed by Brandon Macias, PT [id number] on 10/21/2020 at 2:48 PM

## 2020-10-21 NOTE — PLAN OF CARE
Problem: Nutrition  Goal: Optimal nutrition therapy  10/21/2020 0034 by Lucia Edwards  Outcome: Ongoing  10/20/2020 1101 by Cherise Costa RN  Outcome: Ongoing     Problem: Falls - Risk of:  Goal: Will remain free from falls  Description: Will remain free from falls  10/21/2020 0034 by Lucia Edwards  Outcome: Ongoing  10/20/2020 1101 by Cherise Costa RN  Outcome: Ongoing  Goal: Absence of physical injury  Description: Absence of physical injury  10/21/2020 0034 by Lucia Edwards  Outcome: Ongoing  10/20/2020 1101 by Cherise Costa RN  Outcome: Ongoing     Problem: Skin Integrity:  Goal: Will show no infection signs and symptoms  Description: Will show no infection signs and symptoms  10/21/2020 0034 by Lucia Edwards  Outcome: Ongoing  10/20/2020 1101 by Cherise Costa RN  Outcome: Ongoing  Goal: Absence of new skin breakdown  Description: Absence of new skin breakdown  10/21/2020 0034 by Lucia Edwards  Outcome: Ongoing  10/20/2020 1101 by Cherise Costa RN  Outcome: Ongoing     Problem: Pain:  Goal: Pain level will decrease  Description: Pain level will decrease  10/21/2020 0034 by Lucia Edwards  Outcome: Ongoing  10/20/2020 1101 by Cherise Costa RN  Outcome: Ongoing  Goal: Control of acute pain  Description: Control of acute pain  10/21/2020 0034 by Lucia Edwards  Outcome: Ongoing  10/20/2020 1101 by Cherise Costa RN  Outcome: Ongoing  Goal: Control of chronic pain  Description: Control of chronic pain  10/21/2020 0034 by Lucia Edwards  Outcome: Ongoing  10/20/2020 1101 by Cherise Costa RN  Outcome: Ongoing

## 2020-10-21 NOTE — PROGRESS NOTES
Department of Internal Medicine  General Internal Medicine  Attending Progress Note      SUBJECTIVE:  Pt is doing fair. Her O2 level is <90 0n RA. Post op 5. She would like to be d/c home instead of [facility]. Dr Hazel Joya agree to go home with home care. Afebrile.     OBJECTIVE      Medications    Current Facility-Administered Medications: ipratropium-albuterol (DUONEB) nebulizer solution 1 ampule, 1 ampule, Inhalation, Q4H WA  docusate sodium (COLACE) capsule 100 mg, 100 mg, Oral, Daily  metFORMIN (GLUCOPHAGE) tablet 500 mg, 500 mg, Oral, BID WC  potassium chloride (KLOR-CON M) extended release tablet 20 mEq, 20 mEq, Oral, BID WC  polyethylene glycol (GLYCOLAX) packet 17 g, 17 g, Oral, Daily  apixaban (ELIQUIS) tablet 5 mg, 5 mg, Oral, BID  oxyCODONE-acetaminophen (PERCOCET) 5-325 MG per tablet 1 tablet, 1 tablet, Oral, Q4H PRN **OR** oxyCODONE-acetaminophen (PERCOCET) 5-325 MG per tablet 2 tablet, 2 tablet, Oral, Q4H PRN  benzonatate (TESSALON) capsule 100 mg, 100 mg, Oral, TID PRN  amLODIPine (NORVASC) tablet 10 mg, 10 mg, Oral, BID  pravastatin (PRAVACHOL) tablet 40 mg, 40 mg, Oral, Daily  hydrALAZINE (APRESOLINE) injection 10 mg, 10 mg, Intravenous, Q6H PRN  sodium chloride flush 0.9 % injection 10 mL, 10 mL, Intravenous, 2 times per day  sodium chloride flush 0.9 % injection 10 mL, 10 mL, Intravenous, PRN  promethazine (PHENERGAN) tablet 12.5 mg, 12.5 mg, Oral, Q6H PRN **OR** ondansetron (ZOFRAN) injection 4 mg, 4 mg, Intravenous, Q6H PRN  Physical    VITALS:  /75   Pulse 79   Temp 97.7 °F (36.5 °C) (Oral)   Resp 16   Ht 5' 5\" (1.651 m)   Wt 198 lb 3.1 oz (89.9 kg)   SpO2 (!) 88%   BMI 32.98 kg/m²   CONSTITUTIONAL:  awake, alert, cooperative, no apparent distress, and appears stated age  LUNGS:  No increased work of breathing, good air exchange, clear to auscultation bilaterally, no crackles or wheezing  CARDIOVASCULAR:  Normal apical impulse, regular rate and rhythm, normal S1 and S2, no S3 or S4, and no murmur noted  ABDOMEN:  No scars, normal bowel sounds, soft, non-distended, non-tender, no masses palpated, no hepatosplenomegally  MUSCULOSKELETAL:  There is no redness, warmth, or swelling of the joints. Full range of motion noted. Motor strength is 5 out of 5 all extremities bilaterally. Tone is normal.  Data    CBC:   Lab Results   Component Value Date    WBC 8.4 10/20/2020    RBC 3.79 10/20/2020    HGB 11.3 10/20/2020    HCT 34.1 10/20/2020    MCV 90.1 10/20/2020    MCH 29.8 10/20/2020    MCHC 33.1 10/20/2020    RDW 14.6 10/20/2020     10/20/2020    MPV 8.1 10/20/2020     BMP:    Lab Results   Component Value Date     10/20/2020    K 3.5 10/20/2020    K 4.0 06/23/2020     10/20/2020    CO2 31 10/20/2020    BUN 10 10/20/2020    LABALBU 3.9 06/23/2020    CREATININE 0.7 10/20/2020    CALCIUM 8.6 10/20/2020    GFRAA >60 10/20/2020    GFRAA >60 03/15/2011    LABGLOM >60 10/20/2020    GLUCOSE 108 10/20/2020     FINDINGS:    Endotracheal tube and nasogastric tubes have been removed.  Right internal    jugular sheath persists.  There are small bilateral pleural effusions and    bibasilar airspace disease.  Findings on the left is slightly increased    compared to prior and findings on the right are new.  No pneumothorax. Cardiac and mediastinal contours are without acute process.  No acute osseous    abnormality.              Impression    Small bilateral pleural effusions and bibasilar airspace disease, likely    atelectasis in the absence of clinical signs of pneumonia.  Findings are    new/increased compared to prior. ASSESSMENT AND PLAN      Active Problems:   1. S/p AAA aneurysm repair, post op 5.  f/u by dr Lia Anna. Hemodynamically stable, NG is out.  Extubated. On full liquid diet,. cont PT/OT,. 2.HTN cont med,   3. DM cont meds. Sliding scale ins.coverage. 4. PAD stable,  5. Mild sore throat, reassurance,  6. Hypokalemia,on supplement,improving.   7. Hx of smoking, has cough, get Tessalon pearles. Before d/c dr Piero Palmer would like to have pulm consult, before dc. O2 sat  88 on RA. CXR small pleural efdfusion some atelectasis, see above. 8. No BM on stool softener,  Dr Tenorio Dub

## 2020-10-21 NOTE — PROGRESS NOTES
Patient alert and oriented x4, VSS, sitting up in bed. Patient denies n/v, diarrhea, SOB, states back pain is 6/10, prn pain medication given. Patient reports no BM still and is very concerned. Gave colace and glycolax per MAR. Patient denies further needs at this time. Bed in lowest position, bed alarm on, call light within reach. Will continue to monitor pt needs.

## 2020-10-21 NOTE — CONSULTS
Patient is seen at the request of Dr. Jody Maurice for respiratory failure    PCP: Dallin Moncada MD    HISTORY OF PRESENT ILLNESS: 65y.o. year old female with COPD (FEV1 1.19 L, 53% predicted and restriction) who was admitted on 10/16/20 for AAA repair. She was extubated postoperatively and has been on supplemental oxygen since then. This morning she was weaned to 1 L nasal cannula, but she desaturated to 88%. Patient reports that at baseline she has exertional shortness of breath. She feels that she has been more short of breath over the past few days, but this is mild. She says her breathing feels better with the use of oxygen. She has a chronic intermittent cough.     PAST MEDICAL HISTORY:  Past Medical History:   Diagnosis Date    Arthritis     osteoarthritis    Asthma     Back pain     CAD (coronary artery disease)     Cellulitis     COPD [facility] obstructive pulmonary disease) ([facility])     pt unsure    Depression     Diabetes mellitus ([address]     Enlarging abdominal aortic aneurysm (AAA) ([facility])     History of femoral angiogram 4/30/2015    Hyperlipidemia     Hypertension     PVD (peripheral vascular disease) [facility] [address]        PAST SURGICAL HISTORY:  Past Surgical History:   Procedure Laterality Date    ABDOMINAL AORTIC ANEURYSM REPAIR N/A 10/16/2020    OPEN REPAIR ABDOMINAL AORTIC ANEURYSM performed by Socrates Goodman MD at [address] Right 2/19/16    I and D right gluteal abscess    CARDIAC CATHETERIZATION      CHOLECYSTECTOMY      HERNIA REPAIR      OTHER SURGICAL HISTORY Left 9-    excisional debridement 3.5 cm x 5 cm abscess    VASCULAR SURGERY Left     fem-tib         MEDICATIONS:  Current Facility-Administered Medications: ipratropium-albuterol (DUONEB) nebulizer solution 1 ampule, 1 ampule, Inhalation, Q4H WA  docusate sodium (COLACE) capsule 100 mg, 100 mg, Oral, Daily  metFORMIN (GLUCOPHAGE) tablet 500 mg, 500 mg, Oral, BID   potassium chloride (KLOR-CON M) extended release tablet 20 mEq, 20 mEq, Oral, BID WC  polyethylene glycol (GLYCOLAX) packet 17 g, 17 g, Oral, Daily  apixaban (ELIQUIS) tablet 5 mg, 5 mg, Oral, BID  oxyCODONE-acetaminophen (PERCOCET) 5-325 MG per tablet 1 tablet, 1 tablet, Oral, Q4H PRN **OR** oxyCODONE-acetaminophen (PERCOCET) 5-325 MG per tablet 2 tablet, 2 tablet, Oral, Q4H PRN  benzonatate (TESSALON) capsule 100 mg, 100 mg, Oral, TID PRN  amLODIPine (NORVASC) tablet 10 mg, 10 mg, Oral, BID  pravastatin (PRAVACHOL) tablet 40 mg, 40 mg, Oral, Daily  hydrALAZINE (APRESOLINE) injection 10 mg, 10 mg, Intravenous, Q6H PRN  sodium chloride flush 0.9 % injection 10 mL, 10 mL, Intravenous, 2 times per day  sodium chloride flush 0.9 % injection 10 mL, 10 mL, Intravenous, PRN  promethazine (PHENERGAN) tablet 12.5 mg, 12.5 mg, Oral, Q6H PRN **OR** ondansetron (ZOFRAN) injection 4 mg, 4 mg, Intravenous, Q6H PRN      ALLERGIES:  Patient is allergic to ciprocinonide [fluocinolone]. FAMILY HISTORY:  family history includes Heart Surgery in her father. SOCIAL HISTORY:  Social History     Socioeconomic History    Marital status:      Spouse name: Not on file    Number of children: Not on file    Years of education: Not on file    Highest education level: Not on file   Occupational History    Not on file   Social Needs    Financial resource strain: Not on file    Food insecurity     Worry: Not on file     Inability: Not on file    Transportation needs     Medical: Not on file     Non-medical: Not on file   Tobacco Use    Smoking status: Current Every Day Smoker     Packs/day: 0.50     Years: 45.00     Pack years: 22.50     Types: Cigarettes     Last attempt to quit: 10/26/2019     Years since quittin.9    Smokeless tobacco: Never Used    Tobacco comment: been cutting back with electronic cig's-pt states she is vaping- but not often   Substance and Sexual Activity    Alcohol use:  Yes     Alcohol/week: 3.8 standard drinks     Types: 3 Cans of beer, 1 Standard drinks or equivalent per week    Drug use: No    Sexual activity: Not Currently   Lifestyle    Physical activity     Days per week: Not on file     Minutes per session: Not on file    Stress: Not on file   Relationships    Social connections     Talks on phone: Not on file     Gets together: Not on file     Attends Jainism service: Not on file     Active member of club or organization: Not on file     Attends meetings of clubs or organizations: Not on file     Relationship status: Not on file    Intimate partner violence     Fear of current or ex partner: Not on file     Emotionally abused: Not on file     Physically abused: Not on file     Forced sexual activity: Not on file   Other Topics Concern    Not on file   Social History Narrative    Not on file   Smoked 1 pack/day for 50 years     reports that she has been smoking cigarettes. She has a 22.50 pack-year smoking history. She has never used smokeless tobacco.    REVIEW OF SYSTEMS:  Constitutional: Negative for fever  HENT: Negative for sore throat  Eyes: Negative for redness   Respiratory: + for dyspnea, +cough  Cardiovascular: Negative for chest pain  Gastrointestinal: Negative for vomiting, diarrhea   Genitourinary: Negative for hematuria   Musculoskeletal: Negative for arthralgias   Skin: Negative for rash  Neurological: Negative for syncope  Hematological: Negative for adenopathy  Psychiatric/Behavorial: Negative for anxiety    Objective:   PHYSICAL EXAM:  Blood pressure 126/75, pulse 79, temperature 97.7 °F (36.5 °C), temperature source Oral, resp. rate 16, height 5' 5\" (1.651 m), weight 198 lb 3.1 oz (89.9 kg), SpO2 (!) 88 %, not currently breastfeeding. on 2L NC    Gen: Well developed; well nourished  Eyes: No scleral icterus. No conjunctival injection. ENT:  Oropharynx clear. External appearance of ears and nose normal.  Neck: Trachea midline. No obvious mass.  No visible thyroid enlargement    Respiratory: Faint crackles over left lower lung zone, no accessory muscle use  Cardiovascular: Regular rate and rhythm, no appreciable murmurs. No edema. Gastrointestinal: Soft, non-tender. No hernia. Incision clean, dry and intact  Skin: Warm and dry. No rashes or ulcers on visible areas. Normal texture and turgor  Lymphatic: No cervical LAD. No supraclavicular LAD. Musculoskeletal: No cyanosis, clubbing or joint deformity. Psychiatric: Normal mood and affect; exhibits normal insight and judgement       Data Reviewed:   LABS:  CBC:   Recent Labs     10/20/20  0637   WBC 8.4   HGB 11.3*   HCT 34.1*   MCV 90.1        BMP:   Recent Labs     10/19/20  0427 10/19/20  1440 10/20/20  0637    139 138   K 3.1* 3.3* 3.5    104 101   CO2 27 26 31   BUN 10 10 10   CREATININE 0.7 0.7 0.7     LIVER PROFILE: No results for input(s): AST, ALT, LIPASE, BILIDIR, BILITOT, ALKPHOS in the last 72 hours. Invalid input(s): AMYLASE,  ALB  PT/INR: No results for input(s): PROTIME, INR in the last 72 hours. APTT: No results for input(s): APTT in the last 72 hours. Images and reports from chest imaging were reviewed by me. My interpretation is:  CXR (10/19/20): Bilateral pleuro-parenchymal disease      Stress test (5/1/14- [facility] records)  Interpetation Summary:  The LV EF is 81%  All segments are normal.  There is a small sized, partially reversible defect in the apical  wall. This defect is consistent with breast attenuation. Assessment:     Acute hypoxic respiratory failure  Pleural effusions  Atelectasis  COPD    Plan:      Acute hypoxic respiratory failure  -Seems to be due to a combination of small pleural effusions and atelectasis with underlying COPD  -Wean supplemental oxygen as able to keep oxygen saturation greater than 90%    Pleural effusions  -Give Lasix 20 mg IV x1  -Strict I's and O's  -Check chest x-ray in a.m.     Atelectasis  -Encourage incentive spirometer and out of bed    COPD  -DuoNebs every 4 hours      Thank you for allowing me to participate in the care of this patient. Will follow.      Angelia Hernadez MD  [facility] Pulmonary, Critical Care and Sleep

## 2020-10-21 NOTE — PLAN OF CARE
Problem: Nutrition  Goal: Optimal nutrition therapy  10/21/2020 1139 by Cherise Costa RN  Outcome: Ongoing  10/21/2020 0034 by Lucia Edwards  Outcome: Ongoing     Problem: Falls - Risk of:  Goal: Will remain free from falls  Description: Will remain free from falls  10/21/2020 1139 by Cherise Costa RN  Outcome: Ongoing  10/21/2020 0034 by Lucia Edwards  Outcome: Ongoing  Goal: Absence of physical injury  Description: Absence of physical injury  10/21/2020 1139 by Cherise Costa RN  Outcome: Ongoing  10/21/2020 0034 by Lucia Edwards  Outcome: Ongoing     Problem: Skin Integrity:  Goal: Will show no infection signs and symptoms  Description: Will show no infection signs and symptoms  10/21/2020 1139 by Cherise Costa RN  Outcome: Ongoing  10/21/2020 0034 by Lucia Edwards  Outcome: Ongoing  Goal: Absence of new skin breakdown  Description: Absence of new skin breakdown  10/21/2020 1139 by Cherise Costa RN  Outcome: Ongoing  10/21/2020 0034 by Lucia Edwards  Outcome: Ongoing     Problem: Pain:  Description: Pain management should include both nonpharmacologic and pharmacologic interventions.   Goal: Pain level will decrease  Description: Pain level will decrease  10/21/2020 1139 by Cherise Costa RN  Outcome: Ongoing  10/21/2020 0034 by Lucia Edwards  Outcome: Ongoing  Goal: Control of acute pain  Description: Control of acute pain  10/21/2020 1139 by Cherise Costa RN  Outcome: Ongoing  10/21/2020 0034 by Lucia Edwards  Outcome: Ongoing  Goal: Control of chronic pain  Description: Control of chronic pain  10/21/2020 1139 by Cherise Costa RN  Outcome: Ongoing  10/21/2020 0034 by Lucia Edwards  Outcome: Ongoing

## 2020-10-22 ENCOUNTER — APPOINTMENT (OUTPATIENT)
Dept: GENERAL RADIOLOGY | Age: 70
DRG: 268 | End: 2020-10-22
Attending: SURGERY
Payer: MEDICARE

## 2020-10-22 LAB
ANION GAP SERPL CALCULATED.3IONS-SCNC: 9 MMOL/L (ref 3–16)
BUN BLDV-MCNC: 11 MG/DL (ref 7–20)
CALCIUM SERPL-MCNC: 8.8 MG/DL (ref 8.3–10.6)
CHLORIDE BLD-SCNC: 107 MMOL/L (ref 99–110)
CO2: 30 MMOL/L (ref 21–32)
CREAT SERPL-MCNC: 0.7 MG/DL (ref 0.6–1.2)
GFR AFRICAN AMERICAN: >60
GFR NON-AFRICAN AMERICAN: >60
GLUCOSE BLD-MCNC: 114 MG/DL (ref 70–99)
POTASSIUM SERPL-SCNC: 3.6 MMOL/L (ref 3.5–5.1)
SODIUM BLD-SCNC: 146 MMOL/L (ref 136–145)

## 2020-10-22 PROCEDURE — 99024 POSTOP FOLLOW-UP VISIT: CPT | Performed by: SURGERY

## 2020-10-22 PROCEDURE — 36415 COLL VENOUS BLD VENIPUNCTURE: CPT

## 2020-10-22 PROCEDURE — 80048 BASIC METABOLIC PNL TOTAL CA: CPT

## 2020-10-22 PROCEDURE — 97116 GAIT TRAINING THERAPY: CPT

## 2020-10-22 PROCEDURE — 94760 N-INVAS EAR/PLS OXIMETRY 1: CPT

## 2020-10-22 PROCEDURE — 6370000000 HC RX 637 (ALT 250 FOR IP): Performed by: SURGERY

## 2020-10-22 PROCEDURE — 2580000003 HC RX 258: Performed by: SURGERY

## 2020-10-22 PROCEDURE — 99232 SBSQ HOSP IP/OBS MODERATE 35: CPT | Performed by: INTERNAL MEDICINE

## 2020-10-22 PROCEDURE — 94640 AIRWAY INHALATION TREATMENT: CPT

## 2020-10-22 PROCEDURE — 6360000002 HC RX W HCPCS: Performed by: INTERNAL MEDICINE

## 2020-10-22 PROCEDURE — 71045 X-RAY EXAM CHEST 1 VIEW: CPT

## 2020-10-22 PROCEDURE — 1200000000 HC SEMI PRIVATE

## 2020-10-22 PROCEDURE — 6370000000 HC RX 637 (ALT 250 FOR IP): Performed by: SPECIALIST

## 2020-10-22 PROCEDURE — 2700000000 HC OXYGEN THERAPY PER DAY

## 2020-10-22 RX ORDER — BISACODYL 10 MG
10 SUPPOSITORY, RECTAL RECTAL DAILY PRN
Status: DISCONTINUED | OUTPATIENT
Start: 2020-10-22 | End: 2020-10-23 | Stop reason: HOSPADM

## 2020-10-22 RX ORDER — FUROSEMIDE 10 MG/ML
20 INJECTION INTRAMUSCULAR; INTRAVENOUS ONCE
Status: COMPLETED | OUTPATIENT
Start: 2020-10-22 | End: 2020-10-22

## 2020-10-22 RX ADMIN — IPRATROPIUM BROMIDE AND ALBUTEROL SULFATE 1 AMPULE: .5; 3 SOLUTION RESPIRATORY (INHALATION) at 19:30

## 2020-10-22 RX ADMIN — SODIUM CHLORIDE, PRESERVATIVE FREE 10 ML: 5 INJECTION INTRAVENOUS at 21:00

## 2020-10-22 RX ADMIN — APIXABAN 5 MG: 5 TABLET, FILM COATED ORAL at 10:25

## 2020-10-22 RX ADMIN — FUROSEMIDE 20 MG: 10 INJECTION, SOLUTION INTRAMUSCULAR; INTRAVENOUS at 10:40

## 2020-10-22 RX ADMIN — PRAVASTATIN SODIUM 40 MG: 40 TABLET ORAL at 10:25

## 2020-10-22 RX ADMIN — METFORMIN HYDROCHLORIDE 500 MG: 500 TABLET ORAL at 17:21

## 2020-10-22 RX ADMIN — IPRATROPIUM BROMIDE AND ALBUTEROL SULFATE 1 AMPULE: .5; 3 SOLUTION RESPIRATORY (INHALATION) at 16:26

## 2020-10-22 RX ADMIN — IPRATROPIUM BROMIDE AND ALBUTEROL SULFATE 1 AMPULE: .5; 3 SOLUTION RESPIRATORY (INHALATION) at 07:57

## 2020-10-22 RX ADMIN — IPRATROPIUM BROMIDE AND ALBUTEROL SULFATE 1 AMPULE: .5; 3 SOLUTION RESPIRATORY (INHALATION) at 11:50

## 2020-10-22 RX ADMIN — APIXABAN 5 MG: 5 TABLET, FILM COATED ORAL at 20:59

## 2020-10-22 RX ADMIN — DOCUSATE SODIUM 100 MG: 100 CAPSULE ORAL at 10:25

## 2020-10-22 RX ADMIN — OXYCODONE HYDROCHLORIDE AND ACETAMINOPHEN 2 TABLET: 5; 325 TABLET ORAL at 20:59

## 2020-10-22 RX ADMIN — AMLODIPINE BESYLATE 10 MG: 10 TABLET ORAL at 20:59

## 2020-10-22 RX ADMIN — POLYETHYLENE GLYCOL 3350 17 G: 17 POWDER, FOR SOLUTION ORAL at 10:25

## 2020-10-22 RX ADMIN — METFORMIN HYDROCHLORIDE 500 MG: 500 TABLET ORAL at 10:25

## 2020-10-22 RX ADMIN — SODIUM CHLORIDE, PRESERVATIVE FREE 10 ML: 5 INJECTION INTRAVENOUS at 10:26

## 2020-10-22 RX ADMIN — AMLODIPINE BESYLATE 10 MG: 10 TABLET ORAL at 10:25

## 2020-10-22 ASSESSMENT — PAIN DESCRIPTION - LOCATION
LOCATION: BACK
LOCATION: BACK

## 2020-10-22 ASSESSMENT — PAIN DESCRIPTION - ORIENTATION
ORIENTATION: LEFT;MID
ORIENTATION: LEFT;MID

## 2020-10-22 ASSESSMENT — PAIN DESCRIPTION - FREQUENCY
FREQUENCY: CONTINUOUS
FREQUENCY: CONTINUOUS

## 2020-10-22 ASSESSMENT — PAIN SCALES - GENERAL
PAINLEVEL_OUTOF10: 7
PAINLEVEL_OUTOF10: 2

## 2020-10-22 ASSESSMENT — PAIN DESCRIPTION - DESCRIPTORS
DESCRIPTORS: ACHING;SORE
DESCRIPTORS: ACHING;SORE

## 2020-10-22 ASSESSMENT — PAIN DESCRIPTION - ONSET
ONSET: PROGRESSIVE
ONSET: ON-GOING

## 2020-10-22 ASSESSMENT — PAIN DESCRIPTION - PAIN TYPE
TYPE: ACUTE PAIN
TYPE: ACUTE PAIN

## 2020-10-22 ASSESSMENT — PAIN DESCRIPTION - PROGRESSION
CLINICAL_PROGRESSION: NOT CHANGED
CLINICAL_PROGRESSION: NOT CHANGED

## 2020-10-22 NOTE — PROGRESS NOTES
IV leaking when flush attempted; new dressing applied. Connections checked and tightened. IV Leaking still present. Patient declined new IV stating \"No, Im not doing that again. \" Patient informed patent IV is needed for administration of medications, both routine and in case of emergency. Patient declined IV replacement. Will continue to monitor.

## 2020-10-22 NOTE — PROGRESS NOTES
Revisited patient to inquire about IV replacement. Patient declined stating, \"I won't need any medication through this tomorrow anyway. \" Even when this RN explained an IV is needed in cases of emergency, patient still declined.   Electronically signed by Sourav Coates RN on 10/22/2020 at 2:55 PM

## 2020-10-22 NOTE — PROGRESS NOTES
VASCULAR                 POD#6     Overall feeling better. ++ flatus  Tolerating diet.     VSS          afeb       O2 sat variable with supplemental O2.               Lungs clear  Abd soft with active BS; no distention  Incision intact  B feet warm; palpable L DP pulse          A/P:     S/P open AAA repair              BIIYJ well.              Renal function preserved without any ATN.             Regular carb control diet              Wean O2 to keep sat > 89% with recommendations per Dr. Corby Encarnacion.              ALBERTO with PT/OT              Will discuss COPD management with Dr. Corby Encarnacion and coordinate DC plans. .                 Geronimo Daltono

## 2020-10-22 NOTE — PROGRESS NOTES
(NORVASC) tablet 10 mg, 10 mg, Oral, BID  pravastatin (PRAVACHOL) tablet 40 mg, 40 mg, Oral, Daily  hydrALAZINE (APRESOLINE) injection 10 mg, 10 mg, Intravenous, Q6H PRN  sodium chloride flush 0.9 % injection 10 mL, 10 mL, Intravenous, 2 times per day  sodium chloride flush 0.9 % injection 10 mL, 10 mL, Intravenous, PRN  promethazine (PHENERGAN) tablet 12.5 mg, 12.5 mg, Oral, Q6H PRN **OR** ondansetron (ZOFRAN) injection 4 mg, 4 mg, Intravenous, Q6H PRN    Data reviewed:  Labs:  CBC:   Recent Labs     10/20/20  0637   WBC 8.4   HGB 11.3*   HCT 34.1*   MCV 90.1        BMP:   Recent Labs     10/20/20  0637 10/22/20  0555    146*   K 3.5 3.6    107   CO2 31 30   BUN 10 11   CREATININE 0.7 0.7     LIVER PROFILE: No results for input(s): AST, ALT, LIPASE, BILIDIR, BILITOT, ALKPHOS in the last 72 hours. Invalid input(s): AMYLASE,  ALB  PT/INR: No results for input(s): PROTIME, INR in the last 72 hours. APTT: No results for input(s): APTT in the last 72 hours. Films:  Chest images and reports were reviewed by me. My interpretation is:  CXR (10/22/20): Improvement in bilateral pleuro-parenchymal disease compared to 10/19    Assessment:     Acute hypoxic respiratory failure  Pleural effusions  Atelectasis  COPD      Plan:    Acute hypoxic respiratory failure  -Seems to be due to a combination of small pleural effusions and atelectasis with underlying COPD  -Wean supplemental oxygen as able to keep oxygen saturation greater than 90%     Pleural effusions  -Give another dose of Lasix 20 mg IV x1  -Strict I's and O's     Atelectasis  -Encourage incentive spirometer and out of bed     COPD  -DuoNebs every 4 hours        Thank you for allowing me to participate in the care of this patient. May be stable for discharge tomorrow.      Duran Rome MD  [facility] Pulmonary, Critical Care and Sleep

## 2020-10-22 NOTE — PROGRESS NOTES
Physical Therapy  Facility/Department: [address] MED SURG  Daily Treatment Note  NAME: Deng Orta  : 1950  MRN: [id number]    Date of Service: 10/22/2020  Deng Orta scored a 18/24 on the AM-PAC short mobility form. Current research shows that an AM-PAC score of 18 or greater is typically associated with a discharge to the patient's home setting. Based on the patient's AM-PAC score and their current functional mobility deficits, it is recommended that the patient have 2-3 sessions per week of Physical Therapy at d/c to increase the patient's independence. At this time, this patient demonstrates the endurance and safety to discharge home with HomePT and a follow up treatment frequency of 2-3x/wk. Please see assessment section for further patient specific details. If patient discharges prior to next session this note will serve as a discharge summary. Please see below for the latest assessment towards goals. Discharge Recommendations:  24 hour supervision or assist, Patient would benefit from continued therapy after discharge, S Level 1, Home with Home health PT   PT Equipment Recommendations  Equipment Needed: No  Other: the pt reports she has a walker at home    Assessment   Assessment: Pt. appears to be progressing with mobility following AAA surgical repair. Pt. reporting fatigue but progressing distance of ambulation and able to attempt ambulating without an AD and did well. Will continue to focus on ambulating without an AD. Anticipate d/c to home at discharge. Treatment Diagnosis: impaired functional mobility due to recent abdominal surgery  Prognosis: Good  PT Education: PT Role;General Safety;Transfer Training;Gait Training  Barriers to Learning: none  REQUIRES PT FOLLOW UP: Yes  Activity Tolerance  Activity Tolerance: Patient limited by pain; Patient limited by fatigue     Patient Diagnosis(es): There were no encounter diagnoses.      has a past medical history of Arthritis, Asthma, Back pain, CAD (coronary artery disease), Cellulitis, COPD (chronic obstructive pulmonary disease) ([facility] [address] Depression, Diabetes mellitus ([facility] [address] Enlarging abdominal aortic aneurysm (AAA) ([facility] [address] History of femoral angiogram, Hyperlipidemia, Hypertension, and PVD (peripheral vascular disease) ([facility] [address] has a past surgical history that includes Cholecystectomy; hernia repair; other surgical history (Left, 9-); vascular surgery (Left); Abscess Drainage (Right, 2/19/16); Cardiac catheterization; and Abdominal aortic aneurysm repair (N/A, 10/16/2020). Restrictions  Restrictions/Precautions  Restrictions/Precautions: Fall Risk  Position Activity Restriction  Other position/activity restrictions: 2L O2, s/p abd sx for AAA repair  Subjective   General  Chart Reviewed: Yes  Additional Pertinent Hx: 64 y/o female admit 10/16/2020 with Enlarging Asymptomatic AAA. 10/16/2020 S/P Open Repair AAA. PMH as noted including CAD, COPD, AAA, PVD, L Fem-Tib Bypass, Hernia Repair.   Response To Previous Treatment: Patient reporting fatigue but able to participate.(with encouragement)  Family / Caregiver Present: No  Referring Practitioner: Dr. Faisal Ang  Subjective  Subjective: Pt. lying sidelying in bed upon arrival, has been up to the bathroom and sitting in the recliner this date, now fatigued but willing to work a bit the therapist          Orientation  Orientation  Overall Orientation Status: Within Functional Limits  Cognition      Objective   Bed mobility  Supine to Sit: Stand by assistance(with use of rail and HOB elevated)  Sit to Supine: Contact guard assistance(Flat bed without rail, PT guided through sit to sidlying to supine)  Scooting: Stand by assistance  Comment: Pt. stating will sleep in recliner at home, PT discouraged and trained bed mobility  Transfers  Sit to Stand: Stand by assistance  Stand to sit: Stand by assistance  Ambulation  Ambulation?: Yes  More Ambulation?: Yes  Ambulation 1  Surface: level tile  Device: Rolling Walker  Assistance: Stand by assistance  Quality of Gait: increased speed, forward flexed posture, tends to push the walker too far forward. Appears as if the walker is in the way  Distance: 180' x 1  Ambulation 2  Surface - 2: level tile  Device 2: No device  Assistance 2: Contact guard assistance  Quality of Gait 2: Steady, No LOB, Mild forward flexed  Distance: x 40' x 1  Comments: Limited only d/t fatigue - will conitnue to encourage ambulation without an AD     Balance  Posture: Fair(forward flexed at the hips)  Sitting - Static: Good  Sitting - Dynamic: Good  Standing - Static: Good;-  Standing - Dynamic: Good;-  Exercises  Comments: Pt. wanting to go to sleep as PT interupted resting. AP's, seated marching and LAQ's encouraged at a later time         Comment: Set up for comfort in the bed                                                                        AM-PAC Score  AM-PAC Inpatient Mobility Raw Score : 18 (10/22/20 1455)  AM-PAC Inpatient T-Scale Score : 43.63 (10/22/20 1455)  Mobility Inpatient CMS 0-100% Score: 46.58 (10/22/20 1455)  Mobility Inpatient CMS G-Code Modifier : CK (10/22/20 1455)          Goals  Short term goals  Time Frame for Short term goals: Upon d/c acute care setting.  (progressing toward goals )  Short term goal 1: Bed Mob Supervision. Short term goal 2: Transfers with/without assist device SBA. Short term goal 3: Amb with/without assist device 150' SBA. Short term goal 4: Stairs with/without assist device SBA/CGA. Patient Goals   Patient goals : Return home.     Plan    Plan  Times per week: 3-5x week  Current Treatment Recommendations: Functional Mobility Training, Transfer Training, Gait Training, Stair training, Safety Education & Training, Patient/Caregiver Education & Training, Equipment Evaluation, Education, & procurement  Plan Comment: apparently mariangel-in-law is able to stay with patient initially upon returning home, patient prefers to return home with assist from family rather than go to [facility]  Type of devices: Call light within reach, Chair alarm in place, Gait belt, Patient at risk for falls, Left in chair, Nurse notified  Restraints  Initially in place: No     Therapy Time   Individual Concurrent Group Co-treatment   Time In 1425         Time Out 1440         Minutes 15         Timed Code Treatment Minutes: [address] [facility] [address] [facility], [address] [facility], [id number]

## 2020-10-22 NOTE — PROGRESS NOTES
Department of Internal Medicine  General Internal Medicine  Attending Progress Note      SUBJECTIVE:  Pt has no BM, has gas, would like to have supp. Appreciate pulm. consult, Her O2 sat improved to 94 % on 2 L O2. . CXR pend. Afebrile. Post op 6.     OBJECTIVE      Medications    Current Facility-Administered Medications: bisacodyl (DULCOLAX) suppository 10 mg, 10 mg, Rectal, Daily PRN  ipratropium-albuterol (DUONEB) nebulizer solution 1 ampule, 1 ampule, Inhalation, Q4H WA  docusate sodium (COLACE) capsule 100 mg, 100 mg, Oral, Daily  metFORMIN (GLUCOPHAGE) tablet 500 mg, 500 mg, Oral, BID WC  polyethylene glycol (GLYCOLAX) packet 17 g, 17 g, Oral, Daily  apixaban (ELIQUIS) tablet 5 mg, 5 mg, Oral, BID  oxyCODONE-acetaminophen (PERCOCET) 5-325 MG per tablet 1 tablet, 1 tablet, Oral, Q4H PRN **OR** oxyCODONE-acetaminophen (PERCOCET) 5-325 MG per tablet 2 tablet, 2 tablet, Oral, Q4H PRN  benzonatate (TESSALON) capsule 100 mg, 100 mg, Oral, TID PRN  amLODIPine (NORVASC) tablet 10 mg, 10 mg, Oral, BID  pravastatin (PRAVACHOL) tablet 40 mg, 40 mg, Oral, Daily  hydrALAZINE (APRESOLINE) injection 10 mg, 10 mg, Intravenous, Q6H PRN  sodium chloride flush 0.9 % injection 10 mL, 10 mL, Intravenous, 2 times per day  sodium chloride flush 0.9 % injection 10 mL, 10 mL, Intravenous, PRN  promethazine (PHENERGAN) tablet 12.5 mg, 12.5 mg, Oral, Q6H PRN **OR** ondansetron (ZOFRAN) injection 4 mg, 4 mg, Intravenous, Q6H PRN  Physical    VITALS:  BP (!) 150/75   Pulse 80   Temp 98.5 °F (36.9 °C) (Oral)   Resp 16   Ht 5' 5\" (1.651 m)   Wt 198 lb 3.1 oz (89.9 kg)   SpO2 94%   BMI 32.98 kg/m²   CONSTITUTIONAL:  awake, alert, cooperative, no apparent distress, and appears stated age  LUNGS:  No increased work of breathing, good air exchange, clear to auscultation bilaterally, no crackles or wheezing  CARDIOVASCULAR:  Normal apical impulse, regular rate and rhythm, normal S1 and S2, no S3 or S4, and no murmur noted  ABDOMEN:  No scars, normal bowel sounds, soft, non-distended, non-tender, no masses palpated, no hepatosplenomegally  MUSCULOSKELETAL:  There is no redness, warmth, or swelling of the joints. Full range of motion noted. Motor strength is 5 out of 5 all extremities bilaterally. Tone is normal.  Data    CBC:   Lab Results   Component Value Date    WBC 8.4 10/20/2020    RBC 3.79 10/20/2020    HGB 11.3 10/20/2020    HCT 34.1 10/20/2020    MCV 90.1 10/20/2020    MCH 29.8 10/20/2020    MCHC 33.1 10/20/2020    RDW 14.6 10/20/2020     10/20/2020    MPV 8.1 10/20/2020     BMP:    Lab Results   Component Value Date     10/20/2020    K 3.5 10/20/2020    K 4.0 06/23/2020     10/20/2020    CO2 31 10/20/2020    BUN 10 10/20/2020    LABALBU 3.9 06/23/2020    CREATININE 0.7 10/20/2020    CALCIUM 8.6 10/20/2020    GFRAA >60 10/20/2020    GFRAA >60 03/15/2011    LABGLOM >60 10/20/2020    GLUCOSE 108 10/20/2020       ASSESSMENT AND PLAN      Active Problems:  1. S/p AAA aneurysm repair, post op 6.  f/u by dr Travis Tabares. Hemodynamically stable, NG is out.  Extubated. On full liquid diet,. cont PT/OT,  2. HTN cont med,   3. DM cont meds. Sliding scale ins.coverage. 4. PAD stable,  5. Mild sore throat, reassurance,  6. Hypokalemia, resolved, on supplement,improving. 7. Hx of smoking, Desaturation,  pulm consult appreciated f/u  CXR pending, received Lasix, IV. O2 sat  Improved to 94 % on 2 L O2. From 88 %. 8.No BM on stool softener, and suppository ordered. D/c per dr Travis Tabares.   Dr Lia Caldwell

## 2020-10-22 NOTE — PROGRESS NOTES
Occupational Therapy    Attempted OT treatment. Pt supine and bed and declining mobility. Discussed OT role of self care. Pt reports she wants to wait until later to wash up. Will attempt back tomorrow as schedule permits.   Wesley Pendleton, [address] [place] [address]

## 2020-10-23 VITALS
SYSTOLIC BLOOD PRESSURE: 164 MMHG | RESPIRATION RATE: 16 BRPM | DIASTOLIC BLOOD PRESSURE: 74 MMHG | HEIGHT: 65 IN | TEMPERATURE: 98.9 F | OXYGEN SATURATION: 93 % | HEART RATE: 75 BPM | WEIGHT: 197.31 LBS | BODY MASS INDEX: 32.87 KG/M2

## 2020-10-23 PROCEDURE — 99024 POSTOP FOLLOW-UP VISIT: CPT | Performed by: SURGERY

## 2020-10-23 PROCEDURE — 2580000003 HC RX 258: Performed by: SURGERY

## 2020-10-23 PROCEDURE — 97535 SELF CARE MNGMENT TRAINING: CPT

## 2020-10-23 PROCEDURE — 94640 AIRWAY INHALATION TREATMENT: CPT

## 2020-10-23 PROCEDURE — 6370000000 HC RX 637 (ALT 250 FOR IP): Performed by: SPECIALIST

## 2020-10-23 PROCEDURE — 6370000000 HC RX 637 (ALT 250 FOR IP): Performed by: SURGERY

## 2020-10-23 PROCEDURE — 97530 THERAPEUTIC ACTIVITIES: CPT

## 2020-10-23 PROCEDURE — 94760 N-INVAS EAR/PLS OXIMETRY 1: CPT

## 2020-10-23 PROCEDURE — 99231 SBSQ HOSP IP/OBS SF/LOW 25: CPT | Performed by: INTERNAL MEDICINE

## 2020-10-23 PROCEDURE — 97116 GAIT TRAINING THERAPY: CPT

## 2020-10-23 RX ORDER — OXYCODONE HYDROCHLORIDE AND ACETAMINOPHEN 5; 325 MG/1; MG/1
2 TABLET ORAL EVERY 6 HOURS PRN
Qty: 30 TABLET | Refills: 0 | Status: SHIPPED | OUTPATIENT
Start: 2020-10-23 | End: 2020-11-06

## 2020-10-23 RX ADMIN — IPRATROPIUM BROMIDE AND ALBUTEROL SULFATE 1 AMPULE: .5; 3 SOLUTION RESPIRATORY (INHALATION) at 13:19

## 2020-10-23 RX ADMIN — OXYCODONE HYDROCHLORIDE AND ACETAMINOPHEN 2 TABLET: 5; 325 TABLET ORAL at 14:42

## 2020-10-23 RX ADMIN — PRAVASTATIN SODIUM 40 MG: 40 TABLET ORAL at 08:10

## 2020-10-23 RX ADMIN — POLYETHYLENE GLYCOL 3350 17 G: 17 POWDER, FOR SOLUTION ORAL at 08:10

## 2020-10-23 RX ADMIN — APIXABAN 5 MG: 5 TABLET, FILM COATED ORAL at 08:10

## 2020-10-23 RX ADMIN — DOCUSATE SODIUM 100 MG: 100 CAPSULE ORAL at 08:10

## 2020-10-23 RX ADMIN — SODIUM CHLORIDE, PRESERVATIVE FREE 10 ML: 5 INJECTION INTRAVENOUS at 08:10

## 2020-10-23 RX ADMIN — IPRATROPIUM BROMIDE AND ALBUTEROL SULFATE 1 AMPULE: .5; 3 SOLUTION RESPIRATORY (INHALATION) at 09:13

## 2020-10-23 RX ADMIN — AMLODIPINE BESYLATE 10 MG: 10 TABLET ORAL at 08:10

## 2020-10-23 RX ADMIN — METFORMIN HYDROCHLORIDE 500 MG: 500 TABLET ORAL at 08:10

## 2020-10-23 ASSESSMENT — PAIN DESCRIPTION - PROGRESSION
CLINICAL_PROGRESSION: NOT CHANGED
CLINICAL_PROGRESSION: NOT CHANGED

## 2020-10-23 ASSESSMENT — PAIN DESCRIPTION - DESCRIPTORS
DESCRIPTORS: ACHING;DISCOMFORT
DESCRIPTORS: ACHING

## 2020-10-23 ASSESSMENT — PAIN DESCRIPTION - ONSET
ONSET: ON-GOING
ONSET: ON-GOING

## 2020-10-23 ASSESSMENT — PAIN DESCRIPTION - LOCATION
LOCATION: BACK
LOCATION: BACK

## 2020-10-23 ASSESSMENT — PAIN DESCRIPTION - FREQUENCY
FREQUENCY: CONTINUOUS
FREQUENCY: CONTINUOUS

## 2020-10-23 ASSESSMENT — PAIN SCALES - GENERAL
PAINLEVEL_OUTOF10: 6
PAINLEVEL_OUTOF10: 7
PAINLEVEL_OUTOF10: 4

## 2020-10-23 ASSESSMENT — PAIN DESCRIPTION - ORIENTATION
ORIENTATION: LEFT;MID
ORIENTATION: LEFT;MID

## 2020-10-23 ASSESSMENT — PAIN DESCRIPTION - PAIN TYPE
TYPE: ACUTE PAIN
TYPE: ACUTE PAIN

## 2020-10-23 NOTE — PROGRESS NOTES
Pulmonary Progress Note    CC: Acute hypoxic respiratory failure  Pleural effusions  Atelectasis  COPD    24 hr events:  Weaned to room air yesterday. She reports mild dyspnea on exertion. ROS:  +PIMENTEL  No cough  No vomiting       PHYSICAL EXAM:  Blood pressure (!) 145/62, pulse 71, temperature 98.5 °F (36.9 °C), temperature source Oral, resp. rate 16, height 5' 5\" (1.651 m), weight 197 lb 5 oz (89.5 kg), SpO2 92 %, not currently breastfeeding. on RA    Intake/Output Summary (Last 24 hours) at 10/23/2020 0920  Last data filed at 10/23/2020 7555  Gross per 24 hour   Intake 840 ml   Output 1950 ml   Net -1110 ml       Gen: Well developed; well nourished  Eyes: No scleral icterus. No conjunctival injection. ENT: External appearance of ears and nose normal.  Neck: Trachea midline. No obvious mass. No visible thyroid enlargement    Respiratory: Clear to auscultation bilaterally, no accessory muscle use  Cardiovascular: Regular rate and rhythm, no appreciable murmurs. No edema. Skin: Warm and dry. No rashes or ulcers on visible areas. Normal texture and turgor  Musculoskeletal: No cyanosis, clubbing or joint deformity.     Psychiatric: Normal mood and affect; exhibits normal insight and judgement     Medications:  Current Facility-Administered Medications: bisacodyl (DULCOLAX) suppository 10 mg, 10 mg, Rectal, Daily PRN  ipratropium-albuterol (DUONEB) nebulizer solution 1 ampule, 1 ampule, Inhalation, Q4H WA  docusate sodium (COLACE) capsule 100 mg, 100 mg, Oral, Daily  metFORMIN (GLUCOPHAGE) tablet 500 mg, 500 mg, Oral, BID   polyethylene glycol (GLYCOLAX) packet 17 g, 17 g, Oral, Daily  apixaban (ELIQUIS) tablet 5 mg, 5 mg, Oral, BID  oxyCODONE-acetaminophen (PERCOCET) 5-325 MG per tablet 1 tablet, 1 tablet, Oral, Q4H PRN **OR** oxyCODONE-acetaminophen (PERCOCET) 5-325 MG per tablet 2 tablet, 2 tablet, Oral, Q4H PRN  benzonatate (TESSALON) capsule 100 mg, 100 mg, Oral, TID PRN  amLODIPine (NORVASC) tablet 10 mg,

## 2020-10-23 NOTE — PLAN OF CARE
Problem: Nutrition  Goal: Optimal nutrition therapy  10/23/2020 0729 by Mathew Christian RN  Outcome: Ongoing  10/22/2020 2332 by Richard Montes RN  Outcome: Ongoing     Problem: Falls - Risk of:  Goal: Will remain free from falls  Description: Will remain free from falls  10/23/2020 0729 by Mathew Christian RN  Outcome: Ongoing  10/22/2020 2332 by Richard Montes RN  Outcome: Ongoing  Goal: Absence of physical injury  Description: Absence of physical injury  10/23/2020 0729 by Mathew Christian RN  Outcome: Ongoing  10/22/2020 2332 by Richard Montes RN  Outcome: Ongoing     Problem: Skin Integrity:  Goal: Will show no infection signs and symptoms  Description: Will show no infection signs and symptoms  10/23/2020 0729 by Mathew Christian RN  Outcome: Ongoing  10/22/2020 2332 by Richard Montes RN  Outcome: Ongoing  Goal: Absence of new skin breakdown  Description: Absence of new skin breakdown  10/23/2020 0729 by Mathew Christian RN  Outcome: Ongoing  10/22/2020 2332 by Richard Montes RN  Outcome: Ongoing     Problem: Pain:  Goal: Pain level will decrease  Description: Pain level will decrease  10/23/2020 0729 by Mathew Christian RN  Outcome: Ongoing  10/22/2020 2332 by Richard Montes RN  Outcome: Ongoing  Goal: Control of acute pain  Description: Control of acute pain  10/23/2020 0729 by Mathew Christian RN  Outcome: Ongoing  10/22/2020 2332 by Richard Montes RN  Outcome: Ongoing  Goal: Control of chronic pain  Description: Control of chronic pain  10/23/2020 0729 by Mathew Christian RN  Outcome: Ongoing  10/22/2020 2332 by Richard Montes RN  Outcome: Ongoing

## 2020-10-23 NOTE — PROGRESS NOTES
Occupational Therapy  Facility/Department: [facility] MED SURG  Daily Treatment Note  NAME: Kartik Prieto  : 1950  MRN: [id number]    Date of Service: 10/23/2020    Discharge Recommendations:  Patient would benefit from continued therapy after discharge, 24 hour supervision or assist, Home with Tanner Freeman scored a 19/24 on the AM-PAC ADL Inpatient form. Current research shows that an AM-PAC score of 18 or greater is typically associated with a discharge to the patient's home setting. Based on the patient's AM-PAC score, and their current ADL deficits, it is recommended that the patient have 2-3 sessions per week of Occupational Therapy at d/c to increase the patient's independence. At this time, this patient demonstrates the endurance and safety to discharge home with 24 hour assist and a home OT follow up treatment frequency of 2-3x/wk. Please see assessment section for further patient specific details. If patient discharges prior to next session this note will serve as a discharge summary. Please see below for the latest assessment towards goals. Assessment   Performance deficits / Impairments: Decreased functional mobility ; Decreased strength;Decreased endurance;Decreased ADL status; Decreased safe awareness;Decreased high-level IADLs;Decreased balance  Assessment: Pt making progress towards goals. Pt with improved fxl transfers SBA, and improved standing tolerance/balance to complete fxl mobility further distances into hallway with no AD and SBA. Pt SBA toileting and grooming. Pt primarily requires assist for LB ADLs d/t abd pain s/p surgery. Discussed using A/E to assist and pt verbalized understanding, home OT to further address. Cont per OT POC. Recommend 24 hour assist and home OT at d/c.  Pt reports neighbor and DIL are going to assist.  Prognosis: Good  OT Education: OT Role;Plan of Care;Transfer Training;ADL Adaptive Strategies  REQUIRES OT FOLLOW UP: Yes  Activity Tolerance  Activity Tolerance: Patient limited by fatigue;Patient limited by pain; Patient Tolerated treatment well  Safety Devices  Safety Devices in place: Yes  Type of devices: Call light within reach; Left in chair;Nurse notified;Gait belt(RN okayed to leave chair alarm off)         Patient Diagnosis(es): The encounter diagnosis was Acute postoperative pain. has a past medical history of Arthritis, Asthma, Back pain, CAD (coronary artery disease), Cellulitis, COPD (chronic obstructive pulmonary disease) ([facility] [address] Depression, Diabetes mellitus ([facility] [address] Enlarging abdominal aortic aneurysm (AAA) ([facility] [address] History of femoral angiogram, Hyperlipidemia, Hypertension, and PVD (peripheral vascular disease) ([facility] [address] has a past surgical history that includes Cholecystectomy; hernia repair; other surgical history (Left, 9-); vascular surgery (Left); Abscess Drainage (Right, 2/19/16); Cardiac catheterization; and Abdominal aortic aneurysm repair (N/A, 10/16/2020). Restrictions  Restrictions/Precautions  Restrictions/Precautions: Fall Risk  Position Activity Restriction  Other position/activity restrictions: s/p abd sx for AAA repair  Subjective   General  Chart Reviewed: Yes  Patient assessed for rehabilitation services?: Yes  Additional Pertinent Hx: 64 y/o female admit 10/16/2020 with Enlarging Asymptomatic AAA. 10/16/2020 S/P Open Repair AAA. PMH as noted including CAD, COPD, AAA, PVD, L Fem-Tib Bypass, Hernia Repair. Family / Caregiver Present: No  Referring Practitioner: Frank Lakhani MD  Subjective  Subjective: Pt met b/s for OT tx. Pt in bed on arrival, agreeable to participate in therapy. Pt reports she is being discharged home later this afternoon. Pt c/o 7/10 back pain, RN in to administer pain meds. General Comment  Comments: Per RN ok for therapy.       Orientation  Orientation  Overall Orientation Status: Within Functional Limits  Objective    ADL  Grooming: Stand by assistance(standing Co-treatment   Time In Tamanna Rowley 984         Time Out 1457         Minutes [place] [phone]

## 2020-10-23 NOTE — PROGRESS NOTES
Discharge instructions discussed with Pt. L wrist IV removed without complications. Dry dressing applied to L wrist. Tele monitor removed. Inhaler script given to Pt. Pt requested her Percocet be filled in our retail pharmacy. Percocet delivered to Pt's room. Wheelchair ordered for discharge.

## 2020-10-23 NOTE — PLAN OF CARE
Problem: Nutrition  Goal: Optimal nutrition therapy  10/23/2020 1303 by René Ford RN  Outcome: Completed  10/23/2020 0729 by René Ford RN  Outcome: Ongoing  10/22/2020 2332 by Thais Gillis RN  Outcome: Ongoing     Problem: Falls - Risk of:  Goal: Will remain free from falls  Description: Will remain free from falls  10/23/2020 1303 by René Ford RN  Outcome: Completed  10/23/2020 0729 by René Ford RN  Outcome: Ongoing  10/22/2020 2332 by Thais Gillis RN  Outcome: Ongoing  Goal: Absence of physical injury  Description: Absence of physical injury  10/23/2020 1303 by René Ford RN  Outcome: Completed  10/23/2020 0729 by René Ford RN  Outcome: Ongoing  10/22/2020 2332 by Thais Gillis RN  Outcome: Ongoing     Problem: Skin Integrity:  Goal: Will show no infection signs and symptoms  Description: Will show no infection signs and symptoms  10/23/2020 1303 by René Ford RN  Outcome: Completed  10/23/2020 0729 by René Ford RN  Outcome: Ongoing  10/22/2020 2332 by Thais Gillis RN  Outcome: Ongoing  Goal: Absence of new skin breakdown  Description: Absence of new skin breakdown  10/23/2020 1303 by René Ford RN  Outcome: Completed  10/23/2020 0729 by René Ford RN  Outcome: Ongoing  10/22/2020 2332 by Thais Gillis RN  Outcome: Ongoing     Problem: Pain:  Goal: Pain level will decrease  Description: Pain level will decrease  10/23/2020 1303 by René Ford RN  Outcome: Completed  10/23/2020 0729 by René Ford RN  Outcome: Ongoing  10/22/2020 2332 by Thais Gillis RN  Outcome: Ongoing  Goal: Control of acute pain  Description: Control of acute pain  10/23/2020 1303 by René Ford RN  Outcome: Completed  10/23/2020 0729 by René Ford RN  Outcome: Ongoing  10/22/2020 2332 by Thais Gillis RN  Outcome: Ongoing  Goal: Control of chronic pain  Description: Control of chronic pain  10/23/2020 1303 by René Ford RN  Outcome: Completed  10/23/2020 0729 by René Ford RN  Outcome: Ongoing  10/22/2020 2332 by Genny Ross RN  Outcome: Ongoing

## 2020-10-23 NOTE — PLAN OF CARE
Problem: Nutrition  Goal: Optimal nutrition therapy  Outcome: Ongoing     Problem: Skin Integrity:  Goal: Will show no infection signs and symptoms  Description: Will show no infection signs and symptoms  Outcome: Ongoing  Goal: Absence of new skin breakdown  Description: Absence of new skin breakdown  Outcome: Ongoing     Problem: call light within reach.    Goal: Pain level will decrease  Description: Pain level will decrease  Outcome: Ongoing  Goal: Control of acute pain  Description: Control of acute pain  Outcome: Ongoing  Goal: Control of chronic pain  Description: Control of chronic pain  Outcome: Ongoing

## 2020-10-23 NOTE — PROGRESS NOTES
Standard  Bathroom Equipment: (No DME.)  Home Equipment: Rolling walker  ADL Assistance: Independent  Homemaking Assistance: Independent  Ambulation Assistance: Independent(Without assist device pta.)  Transfer Assistance: Independent  Active : Yes  Type of occupation: Works as a . Leisure & Hobbies: plays darts 2 days/week, staying active  Additional Comments: Son does not live close, limited ability to assist.  Pt reports supportive friends/neighbors.   Cognition   Cognition  Overall Cognitive Status: WFL    Objective  Bed mobility  Supine to Sit: Supervision  Sit to Supine: Unable to assess  Transfers  Sit to Stand: Stand by assistance  Stand to sit: Stand by assistance  Ambulation  Ambulation?: Yes  Ambulation 1  Surface: level tile  Device: No Device  Assistance: Stand by assistance  Quality of Gait: slowed pace, no LOB but did fatigue with longer distance  Distance: 50 feet x 2, 10 feet x 1, 20 feet x 1  Stairs/Curb  Stairs?: Yes  Stairs  # Steps : 8  Stairs Height: 6\"  Rails: Bilateral  Assistance: Contact guard assistance;Stand by assistance  Comment: ascended step over step but descended one at a time; recommend that she complete steps one at a time to conserve energy; the pt needed a rest break after doing the steps     Balance  Posture: Fair(forward flexed at the hips)  Sitting - Static: Good  Sitting - Dynamic: Good  Standing - Static: Good  Standing - Dynamic: Good;-        Plan   Plan  Times per week: 3-5x week  Current Treatment Recommendations: Functional Mobility Training, Transfer Training, Gait Training, Stair training, Safety Education & Training, Patient/Caregiver Education & Training, Equipment Evaluation, Education, & procurement  Plan Comment: apparently mariangel-in-law is able to stay with patient initially upon returning home, patient prefers to return home with assist from family rather than go to [employer]  Safety Devices  Type of devices: Call light within reach, Chair alarm in place, Gait belt, Patient at risk for falls, Left in chair, Nurse notified  Restraints  Initially in place: No      AM-PAC Score  AM-PAC Inpatient Mobility Raw Score : 21 (10/23/20 1446)  AM-PAC Inpatient T-Scale Score : 50.25 (10/23/20 1446)  Mobility Inpatient CMS 0-100% Score: 28.97 (10/23/20 1446)  Mobility Inpatient CMS G-Code Modifier : [facility] (10/23/20 1446)          Goals  Short term goals  Time Frame for Short term goals: Upon d/c acute care setting. Short term goal 1: Bed Mob Supervision. (met)  Short term goal 2: Transfers with/without assist device SBA. (met)  Short term goal 3: Amb with/without assist device 150' SBA. (distance not met)  Short term goal 4: Stairs with/without assist device SBA/CGA. (met)  Patient Goals   Patient goals : Return home.        Therapy Time   Individual Concurrent Group Co-treatment   Time In 1567         Time Out 1457         Minutes 35                 Electronically signed by Ronan Baldwin, PT [id number] on 10/23/2020 at 3:01 PM

## 2020-10-23 NOTE — PROGRESS NOTES
Department of Internal Medicine  General Internal Medicine  Attending Progress Note      SUBJECTIVE:  Pt is doing better, off O2. Her O2 sat 90-92 . Had small amount of BM. She would like to go home.     OBJECTIVE      Medications    Current Facility-Administered Medications: bisacodyl (DULCOLAX) suppository 10 mg, 10 mg, Rectal, Daily PRN  ipratropium-albuterol (DUONEB) nebulizer solution 1 ampule, 1 ampule, Inhalation, Q4H WA  docusate sodium (COLACE) capsule 100 mg, 100 mg, Oral, Daily  metFORMIN (GLUCOPHAGE) tablet 500 mg, 500 mg, Oral, BID WC  polyethylene glycol (GLYCOLAX) packet 17 g, 17 g, Oral, Daily  apixaban (ELIQUIS) tablet 5 mg, 5 mg, Oral, BID  oxyCODONE-acetaminophen (PERCOCET) 5-325 MG per tablet 1 tablet, 1 tablet, Oral, Q4H PRN **OR** oxyCODONE-acetaminophen (PERCOCET) 5-325 MG per tablet 2 tablet, 2 tablet, Oral, Q4H PRN  benzonatate (TESSALON) capsule 100 mg, 100 mg, Oral, TID PRN  amLODIPine (NORVASC) tablet 10 mg, 10 mg, Oral, BID  pravastatin (PRAVACHOL) tablet 40 mg, 40 mg, Oral, Daily  hydrALAZINE (APRESOLINE) injection 10 mg, 10 mg, Intravenous, Q6H PRN  sodium chloride flush 0.9 % injection 10 mL, 10 mL, Intravenous, 2 times per day  sodium chloride flush 0.9 % injection 10 mL, 10 mL, Intravenous, PRN  promethazine (PHENERGAN) tablet 12.5 mg, 12.5 mg, Oral, Q6H PRN **OR** ondansetron (ZOFRAN) injection 4 mg, 4 mg, Intravenous, Q6H PRN  Physical    VITALS:  BP (!) 145/62   Pulse 71   Temp 98.5 °F (36.9 °C) (Oral)   Resp 16   Ht 5' 5\" (1.651 m)   Wt 197 lb 5 oz (89.5 kg)   SpO2 90%   BMI 32.83 kg/m²   CONSTITUTIONAL:  awake, alert, cooperative, no apparent distress, and appears stated age  LUNGS:  No increased work of breathing, good air exchange, clear to auscultation bilaterally, no crackles or wheezing  CARDIOVASCULAR:  Normal apical impulse, regular rate and rhythm, normal S1 and S2, no S3 or S4, and no murmur noted  ABDOMEN:  No scars, normal bowel sounds, soft, non-distended, non-tender, no masses palpated, no hepatosplenomegally  MUSCULOSKELETAL:  There is no redness, warmth, or swelling of the joints. Full range of motion noted. Motor strength is 5 out of 5 all extremities bilaterally. Tone is normal.  Data    CBC:   Lab Results   Component Value Date    WBC 8.4 10/20/2020    RBC 3.79 10/20/2020    HGB 11.3 10/20/2020    HCT 34.1 10/20/2020    MCV 90.1 10/20/2020    MCH 29.8 10/20/2020    MCHC 33.1 10/20/2020    RDW 14.6 10/20/2020     10/20/2020    MPV 8.1 10/20/2020     BMP:    Lab Results   Component Value Date     10/22/2020    K 3.6 10/22/2020    K 4.0 06/23/2020     10/22/2020    CO2 30 10/22/2020    BUN 11 10/22/2020    LABALBU 3.9 06/23/2020    CREATININE 0.7 10/22/2020    CALCIUM 8.8 10/22/2020    GFRAA >60 10/22/2020    GFRAA >60 03/15/2011    LABGLOM >60 10/22/2020    GLUCOSE 114 10/22/2020       ASSESSMENT AND PLAN      Active Problems:    1. S/p AAA aneurysm repair, post op 7.  f/u by dr Jose D Powers. Hemodynamically stable, NG is out.  Extubated. On full liquid diet,. cont PT/OT,  2. HTN cont med,   3. DM cont meds. Sliding scale ins.coverage. 4. PAD stable,  5. Mild sore throat, reassurance,  6. Hypokalemia, resolved, on supplement,improving. 7. Hx of smoking, Desaturation,  pulm consult appreciated f/u  CXR pending, received Lasix, IV. O2 sat  Improved to 90-92 % on RA  8. Had small amount of  BM on stool softener, and suppository ordered. D/c per dr Jose D Powers.   Dr Lilli guadarrama

## 2020-10-23 NOTE — CARE COORDINATION
10/23  Referral sent to [facility]. They can accept. Plan home with 24/7 and [facility] AT [facility].  Electronically signed by Ny Kincaid RN on 10/23/2020 at 9:07 AM

## 2020-10-23 NOTE — DISCHARGE INSTR - COC
Continuity of Care Form    Patient Name: Michi Acosta   :  1950  MRN:  [id number]    Admit date:  10/16/2020  Discharge date:  10/23/20    Code Status Order: Full Code   Advance Directives:   Advance Care Flowsheet Documentation     Date/Time Healthcare Directive Type of Healthcare Directive Copy in [address] Agent's Name Healthcare Agent's Phone Number    10/19/20 1331  No, patient does not have an advance directive for healthcare treatment -- -- -- -- --          Admitting Physician:  Courtney Cordon MD  PCP: Bishop Cano MD    Discharging Nurse: [facility] Unit/Room#: Y1W-7759/1316-31  Discharging Unit Phone Number: [phone]    Emergency Contact:   Extended Emergency Contact Information  Primary Emergency Contact: Eddie Vargas Phone: [phone]  Relation: Child    Past Surgical History:  Past Surgical History:   Procedure Laterality Date    ABDOMINAL AORTIC ANEURYSM REPAIR N/A 10/16/2020    OPEN REPAIR ABDOMINAL AORTIC ANEURYSM performed by Courtney Cordon MD at [address] [place] [address] Right 16    I and D right gluteal abscess    CARDIAC CATHETERIZATION      CHOLECYSTECTOMY      HERNIA REPAIR      OTHER SURGICAL HISTORY Left 2015    excisional debridement 3.5 cm x 5 cm abscess    VASCULAR SURGERY Left     fem-tib       Immunization History: There is no immunization history on file for this patient.     Active Problems:  Patient Active Problem List   Diagnosis Code    Atherosclerosis of native artery of extremity with intermittent claudication (HCC) I70.219    Critical lower limb ischemia I99.8    PAD (peripheral artery disease) ([facility]) I73.9    Tobacco use Z72.0    Essential hypertension I10    Leg pain M79.606    Enlarging abdominal aortic aneurysm (AAA) ([facility] I71.4    AAA (abdominal aortic aneurysm) ([facility] [facility] I71.4    Acute respiratory failure with hypoxia ([facility]) J96.01    Pleural effusion, bilateral Barium Swallow with Video (Video Swallowing Test): not done    Treatments at the Time of Hospital Discharge:   Respiratory Treatments:   Oxygen Therapy:  is not on home oxygen therapy. Ventilator:    - No ventilator support    Rehab Therapies: Physical Therapy and Occupational Therapy  Weight Bearing Status/Restrictions: No weight bearing restirctions  Other Medical Equipment (for information only, NOT a DME order):  ***  Other Treatments: ***    Patient's personal belongings (please select all that are sent with patient):  None    RN SIGNATURE:  Electronically signed by Claudene Mustard, RN on 10/23/20 at 1:10 PM EDT    CASE MANAGEMENT/SOCIAL WORK SECTION    Inpatient Status Date: 10/16/20    Readmission Risk Assessment Score:  Readmission Risk              Risk of Unplanned Readmission:        14           Discharging to Facility/ Agency   · Name: Kike Valdovinos  · [address] [address]  · Phone:[phone]  · CHRISTOPHER:[phone]    Dialysis Facility (if applicable)   · Name:  · Address:  · Dialysis Schedule:  · Phone:  · Fax:    / signature: Electronically signed by Moise Mornoy RN on 10/23/20 at 4:32 PM EDT    PHYSICIAN SECTION    Prognosis: {Prognosis:0737750565}    Condition at Discharge: Krystal8 Jovita Castano Patient Condition:832730866}    Rehab Potential (if transferring to Rehab): {Prognosis:4947567706}    Recommended Labs or Other Treatments After Discharge: ***    Physician Certification: I certify the above information and transfer of Sandra Collier  is necessary for the continuing treatment of the diagnosis listed and that she requires {Admit to Appropriate Level of Care:77234} for {GREATER/LESS:021348038} 30 days.      Update Admission H&P: {CHP DME Changes in RTBDA:081742478}    PHYSICIAN SIGNATURE:  {Esignature:032916029}

## 2020-11-04 RX ORDER — SODIUM CHLORIDE 0.9 % (FLUSH) 0.9 %
10 SYRINGE (ML) INJECTION PRN
Status: CANCELLED | OUTPATIENT
Start: 2020-11-04

## 2020-11-04 RX ORDER — SODIUM CHLORIDE 0.9 % (FLUSH) 0.9 %
10 SYRINGE (ML) INJECTION EVERY 12 HOURS SCHEDULED
Status: CANCELLED | OUTPATIENT
Start: 2020-11-04

## 2020-11-06 NOTE — ADDENDUM NOTE
Addendum  created 11/06/20 1144 by Saintclair Amos, MD    Clinical Note Signed, Intraprocedure Blocks edited

## 2020-11-11 ENCOUNTER — OFFICE VISIT (OUTPATIENT)
Dept: VASCULAR SURGERY | Age: 70
End: 2020-11-11

## 2020-11-11 VITALS
BODY MASS INDEX: 31.99 KG/M2 | SYSTOLIC BLOOD PRESSURE: 156 MMHG | HEIGHT: 65 IN | DIASTOLIC BLOOD PRESSURE: 83 MMHG | WEIGHT: 192 LBS

## 2020-11-11 PROCEDURE — 99024 POSTOP FOLLOW-UP VISIT: CPT | Performed by: SURGERY

## 2020-11-11 NOTE — Clinical Note
Jazzy Miller saw Unknown Kehr in the office today for recent she underwent open repair of her abdominal aortic aneurysm and she is doing quite well. I will follow her in the office in the long-term keep you appraised of her progress. If you have any questions please feel free to contact me. Thanks for asked me to see her and please let me know if I can help you with any of your other patients in the future.     Marian Noble

## 2020-11-11 NOTE — PROGRESS NOTES
First OV S/P open AAA repair 10/16/2020. Overall somewhat depressed but feels well. Poor appetite. Not smoking and remains off O2. EXAM:  Incision well healed    Soft abd with nl BS    A/P: S/P AAA repair   Doing well. May take a bath. Activity as tolerated. May RTW Wednesday. F/U 6 weeks.

## 2020-11-19 NOTE — DISCHARGE SUMMARY
Physician Discharge Summary     Patient ID:  Dylan Duffy  [id number]  86 y.o.  1950    Admit date: 10/16/2020    Discharge date and time: 10/23/2020  4:44 PM     Admitting Physician: Yasmine Simeon MD     Discharge Physician: same    Admission Diagnoses: Abdominal aortic aneurysm (AAA) without rupture ([facility] [I71.4]  AAA (abdominal aortic aneurysm) ([facility] [facility] [I71.4]    Discharge Diagnoses: same, COPD    Admission Condition: good    Discharged Condition: good    Indication for Admission: Enlarging AAA greater than 5 cm    Hospital Course: Admitted for preop hydration. Uneventful repair and perioperative course with DC on POD#7    Consults: pulmonary for O2 demand and COPD assessment    Significant Diagnostic Studies: labs: standard perioperative labs    Outstanding Order Results     No orders found from 9/17/2020 to 10/17/2020. Treatments: surgery: open repair AAA    Discharge Exam:  See chart    Disposition: home    Patient Instructions:   [unfilled]  Activity: no lifting, Driving, or Strenuous exercise for 2 weeks  Diet: regular diet  Wound Care: keep wound clean and dry    Follow-up with Markos Magallon in 2 weeks.     Signed:  Yasmine Simeon  11/19/2020  8:33 AM

## 2021-01-13 ENCOUNTER — OFFICE VISIT (OUTPATIENT)
Dept: VASCULAR SURGERY | Age: 71
End: 2021-01-13

## 2021-01-13 VITALS
BODY MASS INDEX: 31.99 KG/M2 | HEART RATE: 100 BPM | HEIGHT: 65 IN | DIASTOLIC BLOOD PRESSURE: 87 MMHG | SYSTOLIC BLOOD PRESSURE: 152 MMHG | WEIGHT: 192 LBS

## 2021-01-13 DIAGNOSIS — I71.40 ABDOMINAL AORTIC ANEURYSM WITHOUT RUPTURE: Primary | ICD-10-CM

## 2021-01-13 PROCEDURE — 99024 POSTOP FOLLOW-UP VISIT: CPT | Performed by: SURGERY

## 2021-01-13 NOTE — PROGRESS NOTES
Second OV S/P open AAA repair 10/16/2020. Good appetite and activity. C/O constipation and irregularity despite OTC treatment measures. Not smoking and remains off O2. EXAM:  Incision well healed - small knot at end of incision. No fascial defect. Soft abd with nl BS    A/P: S/P AAA repair   Doing well. To discuss bowel issues with PCP. Incisional tenderness and knot should resolve. No further testing or limitations. F/U 1 year postop.

## 2021-03-15 NOTE — PROGRESS NOTES
no distress, appears stated age. Head:  Normocephalic, without obvious abnormality, atraumatic. Eyes:  Pupils equal and round. No scleral icterus. Mouth: Moist mucosa, no pharyngeal erythema. Nose: Nares normal. No drainage or sinus tenderness. Neck: Supple, symmetrical, trachea midline. No adenopathy. No tenderness/mass/nodules. No carotid bruit or elevated JVD. Lungs:   Respiratory Effort: Normal   Auscultation: Clear to auscultation bilaterally, respirations unlabored. No wheeze, rales   Chest Wall:  No tenderness or deformity. Cardiovascular:    Pulses  Palpation: normal   Ascultation: Regular rate, S1/ S2 normal. No murmur, rub, or gallop. 2+ radial and pedal pulses, symmetric  Carotid  Femoral  + doppler PT, DP on right but - none on left   Abdomen and Gastrointestinal:   Soft, non-tender, bowel sounds active. Liver and Spleen  Masses   Musculoskeletal: No muscle wasting  Back  Gait   Extremities: Extremities normal, atraumatic. No cyanosis or edema. No cyanosis clubbing       Skin: Inspection and palpation performed, no rashes or lesions. Pysch: Normal mood and affect.  Alert and oriented to time place person   Neurologic: Normal gross motor and sensory exam.       Labs     All labs have been reviewed    Lab Results   Component Value Date    WBC 9.1 11/11/2019    RBC 4.45 11/11/2019    HGB 13.9 11/11/2019    HCT 40.9 11/11/2019    MCV 91.7 11/11/2019    RDW 14.7 11/11/2019     11/11/2019     Lab Results   Component Value Date     11/11/2019    K 3.8 11/11/2019     11/11/2019    CO2 25 11/11/2019    BUN 19 11/11/2019    CREATININE 0.9 11/11/2019    GFRAA >60 11/11/2019    GFRAA >60 03/15/2011    AGRATIO 1.8 03/15/2011    LABGLOM >60 11/11/2019    GLUCOSE 107 11/11/2019    PROT 6.5 03/15/2011    CALCIUM 9.7 11/11/2019    BILITOT 0.30 03/15/2011    ALKPHOS 95 03/15/2011    AST 24 03/15/2011    ALT 31 03/15/2011     No results found for: PTINR  No results found for: LABA1C  Lab visual inspection.  This measures at least   6 cm in the transverse dimension but less than 2 cm in thickness.  Please see   body of report for details. 4. Aneurysmal dilatation of the abdominal aorta measuring up to 5 cm in   greatest AP dimension as described in body of report. 5. Indeterminate 1.6 cm left adrenal nodule, this can be further evaluated   via dedicated outpatient adrenal protocol MRI. Assessment and Plan     Peripheral Arterial Disease and Claudication  -s/p thrombolytic, thrombectomy of the left femoropopliteal graft. I have personally reviewed her Doppler arterial studies and CTA abdominal aorta with runoff. Left femoral-popliteal graft is patent without any significant stenosis and normal ankle-brachial index. Her symptoms are not vascular related. 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   -continue to follow up with neurosurgery   -will complete BLE venous duplex with reflex study     Hyperlipidemia, mixed   -remain on statin with no reported myalgias     Essential Hypertension  -controlled  -continue medical therapy. Nicotine Addiction.  -again encouraged smoking cessation but patient is in the contemplative stage. 3-5 minutes spent in counseling. Diabetes   -managed per PCP    Routine blood work per PCP     Follow up in 8 months     Thank you for allowing us to participate in the care of [facility]. Please do not hesitate to contact me if you have any questions. Boubacar Garcia MD, MPH    [facility], [address] [place] [address]  Ph: [phone]  Fax: [phone]    2/10/2020 3:30 PM     This note was scribed in the presence of Dr. Bouchra Arceo MD by Roberto López, DOT. Physician Attestation:  The scribes documentation has been prepared under my direction and personally reviewed by me in its entirety.      I confirm [NL] : no acute distress, alert

## 2021-05-11 RX ORDER — GABAPENTIN 300 MG/1
CAPSULE ORAL
Qty: 180 CAPSULE | Refills: 0 | OUTPATIENT
Start: 2021-05-11

## 2021-07-13 ENCOUNTER — HOSPITAL ENCOUNTER (OUTPATIENT)
Dept: WOMENS IMAGING | Age: 71
Discharge: HOME | End: 2021-07-13
Payer: MEDICARE

## 2021-07-13 ENCOUNTER — HOSPITAL ENCOUNTER (OUTPATIENT)
Dept: GENERAL RADIOLOGY | Age: 71
Discharge: HOME | End: 2021-07-13
Payer: MEDICARE

## 2021-07-13 ENCOUNTER — HOSPITAL ENCOUNTER (OUTPATIENT)
Age: 71
Discharge: HOME | End: 2021-07-13
Payer: MEDICARE

## 2021-07-13 DIAGNOSIS — Z12.31 BREAST CANCER SCREENING BY MAMMOGRAM: ICD-10-CM

## 2021-07-13 DIAGNOSIS — J41.0 BRONCHITIS, CHRONIC, SIMPLE (HCC): ICD-10-CM

## 2021-07-13 PROCEDURE — 77067 SCR MAMMO BI INCL CAD: CPT

## 2021-07-13 PROCEDURE — 71046 X-RAY EXAM CHEST 2 VIEWS: CPT

## 2021-07-22 NOTE — PROGRESS NOTES
[address]  Cardiology Progress Note    Maki Villagomez  1950 July 23, 2021      Reason for Referral: PAD     CC: \"I am not doing good today. \"     Subjective:     History of Present Illness:    Maki Villagomez is a 66 y.o. patient with a PMH significant for PAD with known right SFA occlusion hyperlipidemia, hypertension and type 2 diabetes who presents today for management of PAD. She was admitted to [facility] with worsening claudication and underwent a peripheral angiogram. She had EKOS and thrombectomy of left femoropopliteal graft. Today, she is here for follow up. She is depressed today due to her dog passing away 4 days ago. She does have intermittent pain in her legs. Patient denies exertional chest pain/pressure, dyspnea at rest, PIMENTEL, PND, orthopnea, palpitations, lightheadedness, weight changes, changes in LE edema, and syncope. Patient reports compliance to her medications. Past Medical History:   has a past medical history of Arthritis, Asthma, Back pain, CAD (coronary artery disease), Cellulitis, COPD (chronic obstructive pulmonary disease) ([address] Depression, Diabetes mellitus ([address] Enlarging abdominal aortic aneurysm (AAA) ([address] History of femoral angiogram, Hyperlipidemia, Hypertension, and PVD (peripheral vascular disease) ([address] Surgical History:   has a past surgical history that includes Cholecystectomy; hernia repair; other surgical history (Left, 9-); vascular surgery (Left); Abscess Drainage (Right, 2/19/16); Cardiac catheterization; and Abdominal aortic aneurysm repair (N/A, 10/16/2020). Social History:   reports that she has been smoking cigarettes. She has a 22.50 pack-year smoking history. She has never used smokeless tobacco. She reports current alcohol use of about 3.8 standard drinks of alcohol per week. She reports that she does not use drugs. Family History:  family history includes Heart Surgery in her father.     Home Medications:  Were reviewed and are listed in nursing record and/or below  Prior to Admission medications    Medication Sig Start Date End Date Taking? Authorizing Provider   John Foote [phone] MCG/INH AEPB  7/21/21  Yes Historical Provider, MD   clopidogrel (PLAVIX) 75 MG tablet TAKE 1 TABLET BY MOUTH EVERY DAY 8/31/20  Yes Araceli Madden MD   gabapentin (NEURONTIN) 300 MG capsule Take 1 capsule by mouth 2 times daily for 180 days. Intended supply: 30 days 8/31/20 7/23/21 Yes Araceli Madden MD   ELIQUIS 5 MG TABS tablet TAKE 1 TABLET BY MOUTH TWICE A DAY  Patient taking differently: 5 mg daily  6/9/20  Yes Albert Dominique MD   acetaminophen (TYLENOL) 500 MG tablet Take 500 mg by mouth every 6 hours as needed for Pain   Yes Historical Provider, MD   metFORMIN (GLUCOPHAGE) 500 MG tablet Take 500 mg by mouth as needed Pt states she takes as needed/ she goes back and forth with taking one a day \"here and there\"   Yes Historical Provider, MD   amLODIPine (NORVASC) 5 MG tablet Take 5 mg by mouth 2 times daily    Yes Historical Provider, MD   pravastatin (PRAVACHOL) 40 MG tablet Take 40 mg by mouth daily. Yes Historical Provider, MD      Allergies:  Ciprocinonide [fluocinolone]     Review of Systems: all reviewed and refer to HPI   · Constitutional: no unanticipated weight loss. There's been no change in energy level, sleep pattern, or activity level. No fevers, chills. · Eyes: No visual changes or diplopia. No scleral icterus. · ENT: No Headaches, hearing loss or vertigo. No mouth sores or sore throat. · Cardiovascular: No Chest pain, tightness or discomfort.  No Shortness of breath. No Dyspnea on exertion, Orthopnea, Paroxysmal nocturnal dyspnea or breathlessness at rest.   No Palpitations.  No Syncope ('blackouts', 'faints', 'collapse') or dizziness. · Respiratory: No cough or wheezing, no sputum production. No hematemesis. · Gastrointestinal: No abdominal pain, appetite loss, blood in stools.  No change in bowel or bladder habits. · Genitourinary: No dysuria, trouble voiding, or hematuria. · Musculoskeletal:  No gait disturbance, no joint complaints. · Integumentary: No rash or pruritis. · Neurological: No headache, diplopia, change in muscle strength, numbness or tingling. · Psychiatric: No anxiety or depression. · Endocrine: No temperature intolerance. No excessive thirst, fluid intake, or urination. No tremor. · Hematologic/Lymphatic: No abnormal bruising or bleeding, blood clots or swollen lymph nodes. · Allergic/Immunologic: No nasal congestion or hives. · Vascular: BLE edema and pain       Objective:     PHYSICAL EXAM:      Vitals:    07/23/21 1126   BP: 128/66   Pulse: 88   SpO2: 96%   Weight: 184 lb 12.8 oz (83.8 kg)   Height: 5' 5\" (1.651 m)      Weight: 184 lb 12.8 oz (83.8 kg)       General Appearance:  Alert, cooperative, no distress, appears stated age. Head:  Normocephalic, without obvious abnormality, atraumatic. Eyes:  Pupils equal and round. No scleral icterus. Mouth: Moist mucosa, no pharyngeal erythema. Nose: Nares normal. No drainage or sinus tenderness. Neck: Supple, symmetrical, trachea midline. No adenopathy. No tenderness/mass/nodules. No carotid bruit or elevated JVD. Lungs:   Respiratory Effort: Normal   Auscultation: Clear to auscultation bilaterally, respirations unlabored. No wheeze, rales   Chest Wall:  No tenderness or deformity. Cardiovascular:    Pulses  Palpation: normal   Ascultation: Regular rate, S1/ S2 normal. No murmur, rub, or gallop. 2+ radial and pedal pulses, symmetric  Carotid  Femoral  + doppler PT, DP on right but - none on left   Abdomen and Gastrointestinal:   Soft, non-tender, bowel sounds active. Liver and Spleen  Masses   Musculoskeletal: No muscle wasting  Back  Gait   Extremities: Extremities normal, atraumatic. No cyanosis or edema. No cyanosis clubbing       Skin: Inspection and palpation performed, no rashes or lesions. Pysch: Normal mood and affect. Alert and oriented to time place person   Neurologic: Normal gross motor and sensory exam.       Labs     All labs have been reviewed    Lab Results   Component Value Date    WBC 8.4 10/20/2020    RBC 3.79 10/20/2020    HGB 11.3 10/20/2020    HCT 34.1 10/20/2020    MCV 90.1 10/20/2020    RDW 14.6 10/20/2020     10/20/2020     Lab Results   Component Value Date     10/22/2020    K 3.6 10/22/2020    K 4.0 06/23/2020     10/22/2020    CO2 30 10/22/2020    BUN 11 10/22/2020    CREATININE 0.7 10/22/2020    GFRAA >60 10/22/2020    GFRAA >60 03/15/2011    AGRATIO 1.4 06/23/2020    LABGLOM >60 10/22/2020    GLUCOSE 114 10/22/2020    PROT 6.6 06/23/2020    PROT 6.5 03/15/2011    CALCIUM 8.8 10/22/2020    BILITOT 0.8 06/23/2020    ALKPHOS 86 06/23/2020    AST 12 06/23/2020    ALT 12 06/23/2020     No results found for: PTINR  No results found for: LABA1C  Lab Results   Component Value Date    TROPONINI <0.01 04/15/2014       Cardiac, Vascular and Imaging Data all Personally Reviewed in Detail by Myself      EKG Interpretation: none     Stress Test: 5/1/14 ([facility])   The LV EF is 81%  All segments are normal. There is a small sized, partially reversible defect in the apical wall. This defect is consistent with breast attenuation. 1.Non-diagnostic ECG for ischemia with pharmocologic stress. 2.Normal left ventriular systolic function. 3.Negative nuclear stress imaging for inducible ischemia. 4.Nuclear stress image findings indicate low risk for future ischemic event . Stress test 9/15/2020   Pharmacological Stress/MPI Results:        1. Technically a satisfactory study.    2. Normal pharmacological stress portion of the study.    3. No evidence of Ischemia by Myocardial Perfusion Imaging.    4. Gated Study shows normal LV size and Systolic function; EF is 70 %. [facility]    Renal arteries are widely patent. Infrarenal aorta is ectatic.  May have a small aneurysm.  The  common iliac, external and internal iliac arteries are patent. There is some mild disease but no high-grade stenosis noted. Right common femoral, profunda femoral, superficial  Femoral\arteries are patent. Distal SFA is occluded. Reconstitution of the popliteal and the  patient has two-vessel runoff on the right. Left common femoral and profunda femoral arteries  patent.  The patient has a fem below-the-knee pop bypass which appears widely patent.  Multiple views were obtained at both the proximal and distal anastomosis and I am unable to see any significant stenosis. The flow does appear to be slightly sluggish  compared to the right side, however, I do not see any evidence of stenosis. The patient has two vessel runoff to the left lower extremity. Peripheral angiogram 4/28/15 (Dr. Yaron Alexander)   Irregular right superficial femoral artery through the majority of its course without hemodynamically significant stenoses. The right superficial femoral artery and proximal above knee popliteal artery were heavily diseased and occluded for a distance of 3 cm at the adductor canal.  The right popliteal artery reconstituted at that level with widely patent below knee  runoff through the posterior tibial and peroneal arteries. Unsuccessful attempt at recanalization and angioplasty of the right  superficial femoral/popliteal artery occlusion. Peripheral angiogram 10/21/19  Abdominal aorta has small aneurysm present. It is tortuous. Patent mesenteric and renals. Left and right common iliac arteries and external iliac arteries are patent. Left common femoral artery and profunda are patent. Left superficial femoral artery is occluded. Left femoropopliteal graft is occluded. The patient has two-vessel run off below the knee. Placement of EKOS tPA catheter after performing mechanical thrombectomy of the left femoropopliteal graft. LE arterial duplex 11/1/19  There is an KIYA of 0.7 on the right.  Elevated velocity of the proximal common femoral and mid femoral arteries    suggests a less than 50% stenosis.    Elevated velocity of the the proximal popliteal artery suggests a 50-70% stenosis     There is an KIYA of 0.9 on the left.    The left femoral to popliteal arterial bypass graft appears patent with    normal waveforms throughout.    The majority of the waveforms are biphasic throughout the left lower extremity      CTA abd aorta w bilat. Runoff:11/12/19  1. Right lower extremity: Findings most compatible with chronic occlusion of   the above the knee popliteal artery with distal reconstitution.  There is   three-vessel runoff to the level of the ankle.  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   2. Left lower extremity: Patent femoral to popliteal bypass graft with   findings most compatible with chronic occlusion of the superficial femoral   artery. [place] is three-vessel runoff to the level of the ankle. 3. Thin fluid collection within the subcutaneous fat of the right groin with   a suggestion of a defined wall, the possibility of an abscess cannot be   entirely excluded, correlate with visual inspection.  This measures at least   6 cm in the transverse dimension but less than 2 cm in thickness.  Please see   body of report for details. 4. Aneurysmal dilatation of the abdominal aorta measuring up to 5 cm in   greatest AP dimension as described in body of report. 5. Indeterminate 1.6 cm left adrenal nodule, this can be further evaluated   via dedicated outpatient adrenal protocol MRI. CT abdomen and pelvis 6/23/2020  Cellulitis of the left inguinal tissues but no abscess.       5.6 cm saccular abdominal aortic aneurysm. Recommend referral to a vascular   specialist.         Assessment and Plan     Peripheral Arterial Disease and Claudication  Intermittent pain with walking. Continue Asa and Plavix.  Repeat bilateral lower extremity arterial doppler/     Abdominal aortic aneurysm   S/p open repair 10/16/2020. Managed by Dr Vida Pyle. Hyperlipidemia, mixed   Continue Pravachol 40 mg daily. Essential Hypertension  Controlled. Continue current medical management. Will order echocardiogram.    Murmur  Will order echocardiogram to assess further. Asymptomatic. Nicotine Addiction. Occasionally smokes. I have spent 3-5 minutes on smoking cessation. Diabetes   Managed per PCP      Follow up in 6 months. Thank you for allowing us to participate in the care of Deng Orta. Please do not hesitate to contact me if you have any questions. Fidelia Jones MD, MPH    [facility], [address]   [facility] [address]  Ph: [phone]  Fax: [phone]    This note was scribed in the presence of Dr Lori Patricia, by Jennifer Singer RN  Physician Attestation:  The scribes documentation has been prepared under my direction and personally reviewed by me in its entirety. I confirm that the note above accurately reflects all work, treatment, procedures, and medical decision making performed by me.

## 2021-07-23 ENCOUNTER — OFFICE VISIT (OUTPATIENT)
Dept: CARDIOLOGY CLINIC | Age: 71
End: 2021-07-23
Payer: MEDICARE

## 2021-07-23 VITALS
HEIGHT: 65 IN | SYSTOLIC BLOOD PRESSURE: 128 MMHG | HEART RATE: 88 BPM | WEIGHT: 184.8 LBS | OXYGEN SATURATION: 96 % | DIASTOLIC BLOOD PRESSURE: 66 MMHG | BODY MASS INDEX: 30.79 KG/M2

## 2021-07-23 DIAGNOSIS — I73.9 PAD (PERIPHERAL ARTERY DISEASE) (HCC): Primary | ICD-10-CM

## 2021-07-23 DIAGNOSIS — I71.40 ABDOMINAL AORTIC ANEURYSM (AAA) WITHOUT RUPTURE: ICD-10-CM

## 2021-07-23 DIAGNOSIS — Z72.0 TOBACCO USE: ICD-10-CM

## 2021-07-23 DIAGNOSIS — R01.1 MURMUR: ICD-10-CM

## 2021-07-23 DIAGNOSIS — I10 ESSENTIAL HYPERTENSION: ICD-10-CM

## 2021-07-23 PROCEDURE — 99213 OFFICE O/P EST LOW 20 MIN: CPT | Performed by: INTERNAL MEDICINE

## 2021-07-23 RX ORDER — FLUTICASONE FUROATE, UMECLIDINIUM BROMIDE AND VILANTEROL TRIFENATATE 100; 62.5; 25 UG/1; UG/1; UG/1
POWDER RESPIRATORY (INHALATION)
COMMUNITY
Start: 2021-07-21 | End: 2022-01-25 | Stop reason: ALTCHOICE

## 2021-07-23 NOTE — PATIENT INSTRUCTIONS

## 2021-08-24 ENCOUNTER — HOSPITAL ENCOUNTER (OUTPATIENT)
Dept: VASCULAR LAB | Age: 71
Discharge: HOME | End: 2021-08-24
Payer: MEDICARE

## 2021-08-24 ENCOUNTER — HOSPITAL ENCOUNTER (OUTPATIENT)
Dept: NON INVASIVE DIAGNOSTICS | Age: 71
Discharge: HOME | End: 2021-08-24
Payer: MEDICARE

## 2021-08-24 DIAGNOSIS — I73.9 PAD (PERIPHERAL ARTERY DISEASE) (HCC): ICD-10-CM

## 2021-08-24 DIAGNOSIS — R01.1 MURMUR: ICD-10-CM

## 2021-08-24 LAB
LV EF: 63 %
LVEF MODALITY: NORMAL

## 2021-08-24 PROCEDURE — 93925 LOWER EXTREMITY STUDY: CPT

## 2021-08-24 PROCEDURE — 93306 TTE W/DOPPLER COMPLETE: CPT

## 2021-08-30 NOTE — PROGRESS NOTES
[facility]  Cardiology Progress Note    Mariann Albarado  1950 August 31, 2021      Reason for Referral: PAD     CC: \"My leg hurts at times. \"     Subjective:     History of Present Illness:    Mariann Albarado is a 66 y.o. patient with a PMH significant for PAD with known right SFA occlusion hyperlipidemia, hypertension and type 2 diabetes who presents today for management of PAD. She was admitted to [facility] with worsening claudication and underwent a peripheral angiogram. She had EKOS and thrombectomy of left femoropopliteal graft. Today, she is here to discuss her doppler results. She says that she does have intermittent pain in her legs with walking. She says that she gets tal horse in her foot at times. She has not smoked a cigarette in 2 days. She says that she is not going to start again. Patient denies exertional chest pain/pressure, dyspnea at rest, PIMENTEL, PND, orthopnea, palpitations, lightheadedness, weight changes, changes in LE edema, and syncope. Past Medical History:   has a past medical history of Arthritis, Asthma, Back pain, CAD (coronary artery disease), Cellulitis, COPD (chronic obstructive pulmonary disease) ([address] Depression, Diabetes mellitus ([address] Enlarging abdominal aortic aneurysm (AAA) ([address] History of femoral angiogram, Hyperlipidemia, Hypertension, and PVD (peripheral vascular disease) ([address] Surgical History:   has a past surgical history that includes Cholecystectomy; hernia repair; other surgical history (Left, 9-); vascular surgery (Left); Abscess Drainage (Right, 2/19/16); Cardiac catheterization; and Abdominal aortic aneurysm repair (N/A, 10/16/2020). Social History:   reports that she has been smoking cigarettes. She has a 22.50 pack-year smoking history. She has never used smokeless tobacco. She reports current alcohol use of about 3.8 standard drinks of alcohol per week. She reports that she does not use drugs.      Family History:  family history includes Heart Surgery in her father. Home Medications:  Were reviewed and are listed in nursing record and/or below  Prior to Admission medications    Medication Sig Start Date End Date Taking? Authorizing Provider   [place] [id number] MCG/INH AEPB  7/21/21  Yes Historical Provider, MD   clopidogrel (PLAVIX) 75 MG tablet TAKE 1 TABLET BY MOUTH EVERY DAY 8/31/20  Yes Luis Zimmer MD   ELIQUIS 5 MG TABS tablet TAKE 1 TABLET BY MOUTH TWICE A DAY  Patient taking differently: 5 mg daily  6/9/20  Yes Cookie Sandhoff, MD   acetaminophen (TYLENOL) 500 MG tablet Take 500 mg by mouth every 6 hours as needed for Pain   Yes Historical Provider, MD   metFORMIN (GLUCOPHAGE) 500 MG tablet Take 500 mg by mouth as needed Pt states she takes as needed/ she goes back and forth with taking one a day \"here and there\"   Yes Historical Provider, MD   amLODIPine (NORVASC) 5 MG tablet Take 5 mg by mouth 2 times daily    Yes Historical Provider, MD   pravastatin (PRAVACHOL) 40 MG tablet Take 40 mg by mouth daily. Yes Historical Provider, MD   gabapentin (NEURONTIN) 300 MG capsule Take 1 capsule by mouth 2 times daily for 180 days. Intended supply: 30 days 8/31/20 7/23/21  Luis Zimmer MD      Allergies:  Ciprocinonide [fluocinolone]     Review of Systems: all reviewed and refer to HPI   · Constitutional: no unanticipated weight loss. There's been no change in energy level, sleep pattern, or activity level. No fevers, chills. · Eyes: No visual changes or diplopia. No scleral icterus. · ENT: No Headaches, hearing loss or vertigo. No mouth sores or sore throat. · Cardiovascular: No Chest pain, tightness or discomfort.  No Shortness of breath. No Dyspnea on exertion, Orthopnea, Paroxysmal nocturnal dyspnea or breathlessness at rest.   No Palpitations.  No Syncope ('blackouts', 'faints', 'collapse') or dizziness. · Respiratory: No cough or wheezing, no sputum production. No hematemesis.     · Gastrointestinal: No abdominal pain, appetite loss, blood in stools. No change in bowel or bladder habits. · Genitourinary: No dysuria, trouble voiding, or hematuria. · Musculoskeletal:  No gait disturbance, no joint complaints. · Integumentary: No rash or pruritis. · Neurological: No headache, diplopia, change in muscle strength, numbness or tingling. · Psychiatric: No anxiety or depression. · Endocrine: No temperature intolerance. No excessive thirst, fluid intake, or urination. No tremor. · Hematologic/Lymphatic: No abnormal bruising or bleeding, blood clots or swollen lymph nodes. · Allergic/Immunologic: No nasal congestion or hives. · Vascular: BLE edema and pain       Objective:     PHYSICAL EXAM:      Vitals:    08/31/21 1137   BP: 132/80   Pulse: 91   SpO2: 95%   Weight: 180 lb (81.6 kg)   Height: 5' 5\" (1.651 m)      Weight: 180 lb (81.6 kg)       General Appearance:  Alert, cooperative, no distress, appears stated age. Head:  Normocephalic, without obvious abnormality, atraumatic. Eyes:  Pupils equal and round. No scleral icterus. Mouth: Moist mucosa, no pharyngeal erythema. Nose: Nares normal. No drainage or sinus tenderness. Neck: Supple, symmetrical, trachea midline. No adenopathy. No tenderness/mass/nodules. No carotid bruit or elevated JVD. Lungs:   Respiratory Effort: Normal   Auscultation: Clear to auscultation bilaterally, respirations unlabored. No wheeze, rales   Chest Wall:  No tenderness or deformity. Cardiovascular:    Pulses  Palpation: normal   Ascultation: Regular rate, S1/ S2 normal. No murmur, rub, or gallop. 2+ radial and pedal pulses, symmetric  Carotid  Femoral  + doppler PT, DP on right but - none on left   Abdomen and Gastrointestinal:   Soft, non-tender, bowel sounds active. Liver and Spleen  Masses   Musculoskeletal: No muscle wasting  Back  Gait   Extremities: Extremities normal, atraumatic. No cyanosis or edema.  No cyanosis clubbing       Skin: Inspection and palpation wall thickness, and systolic function with an  estimated ejection fraction of 60-65%. No regional wall motion abnormalities  are seen. Normal right ventricular size and function. Trivial mitral regurgitation. The aortic valve leaflets are not well visualized but appear thickened. No  stenosis based on doppler velocity/gradient. Peripheral angiogram 12/30/14 ([facility])    Renal arteries are widely patent. Infrarenal aorta is ectatic. May have a small aneurysm.  The  common iliac, external and internal iliac arteries are patent. There is some mild disease but no high-grade stenosis noted. Right common femoral, profunda femoral, superficial  Femoral\arteries are patent. Distal SFA is occluded. Reconstitution of the popliteal and the  patient has two-vessel runoff on the right. Left common femoral and profunda femoral arteries  patent.  The patient has a fem below-the-knee pop bypass which appears widely patent.  Multiple views were obtained at both the proximal and distal anastomosis and I am unable to see any significant stenosis. The flow does appear to be slightly sluggish  compared to the right side, however, I do not see any evidence of stenosis. The patient has two vessel runoff to the left lower extremity. Peripheral angiogram 4/28/15 (Dr. Vida Pyle)   Irregular right superficial femoral artery through the majority of its course without hemodynamically significant stenoses. The right superficial femoral artery and proximal above knee popliteal artery were heavily diseased and occluded for a distance of 3 cm at the adductor canal.  The right popliteal artery reconstituted at that level with widely patent below knee  runoff through the posterior tibial and peroneal arteries. Unsuccessful attempt at recanalization and angioplasty of the right  superficial femoral/popliteal artery occlusion. Peripheral angiogram 10/21/19  Abdominal aorta has small aneurysm present. It is tortuous.   Patent mesenteric and renals. Left and right common iliac arteries and external iliac arteries are patent. Left common femoral artery and profunda are patent. Left superficial femoral artery is occluded. Left femoropopliteal graft is occluded. The patient has two-vessel run off below the knee. Placement of EKOS tPA catheter after performing mechanical thrombectomy of the left femoropopliteal graft. LE arterial duplex 11/1/19  There is an KIYA of 0.7 on the right.    Elevated velocity of the proximal common femoral and mid femoral arteries    suggests a less than 50% stenosis.    Elevated velocity of the the proximal popliteal artery suggests a 50-70% stenosis     There is an KIYA of 0.9 on the left.    The left femoral to popliteal arterial bypass graft appears patent with    normal waveforms throughout.    The majority of the waveforms are biphasic throughout the left lower extremity      CTA abd aorta w bilat. Runoff:11/12/19  1. Right lower extremity: Findings most compatible with chronic occlusion of   the above the knee popliteal artery with distal reconstitution.  There is   three-vessel runoff to the level of the ankle.  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   2. Left lower extremity: Patent femoral to popliteal bypass graft with   findings most compatible with chronic occlusion of the superficial femoral   artery. [place] is three-vessel runoff to the level of the ankle. 3. Thin fluid collection within the subcutaneous fat of the right groin with   a suggestion of a defined wall, the possibility of an abscess cannot be   entirely excluded, correlate with visual inspection.  This measures at least   6 cm in the transverse dimension but less than 2 cm in thickness.  Please see   body of report for details. 4. Aneurysmal dilatation of the abdominal aorta measuring up to 5 cm in   greatest AP dimension as described in body of report.    5. Indeterminate 1.6 cm left adrenal nodule, this can be further evaluated   via dedicated outpatient adrenal protocol MRI. CT abdomen and pelvis 6/23/2020  Cellulitis of the left inguinal tissues but no abscess.       5.6 cm saccular abdominal aortic aneurysm. Recommend referral to a vascular   specialist.     BLE arterial Doppler 8/24/2021  Right Impression   Right KIYA 0.81. This is consistent with mild PAD at rest.   Elevated velocity of the mid superficial femoral suggests a less than 50%   stenosis. Possible occlusion of the right peroneal artery. Left Impression   Left KIYA 0.58. This is consistent with moderate PAD at rest.   Possible occlusion of the proximal popliteal artery. Occlusion of the left femoral- popliteal arterial bypass graft. Elevated velocity of the proximal superficial femoral artery suggests a less   than 50% stenosis. Assessment and Plan     Peripheral Arterial Disease and Claudication  With increasing claudication symptoms. Occlusion of the femoropopliteal graft with popliteal artery occlusion. Intermittent pain with walking. Continue Asa, Eliquis and Plavix. Recent Doppler revealed left femoral-popliteal graft occluded. I have discussed with her options of peripheral angiogram or medical management. But suggested that we wait a bit and try medical therapy. She has decided medical management at this time and she will call if she wishes to proceed with peripheral angiogram. Encouraged regular walking program.     Abdominal aortic aneurysm   S/p open repair 10/16/2020. Managed by Dr Lesvia Guillen. Hyperlipidemia, mixed   Continue Pravachol 40 mg daily. Essential Hypertension  Controlled. Continue current medical management. Will order echocardiogram.    Murmur  Asymptomatic. Nicotine Addiction. Occasionally smokes. I have spent 3-5 minutes on smoking cessation. Diabetes   Managed per PCP      Follow up in 2 months.      Thank you for allowing us to participate in the care of Gio Alegre. Please do not hesitate to contact me if you have any questions. Damien Ruby MD, MPH    [facility], [place], [facility], [facility]  Ph: [phone]  Fax: [phone]    This note was scribed in the presence of Dr Kaitlin Neri, by aJck Lemons RN  Physician Attestation:  The scribes documentation has been prepared under my direction and personally reviewed by me in its entirety. I confirm that the note above accurately reflects all work, treatment, procedures, and medical decision making performed by me.

## 2021-08-31 ENCOUNTER — OFFICE VISIT (OUTPATIENT)
Dept: CARDIOLOGY CLINIC | Age: 71
End: 2021-08-31
Payer: MEDICARE

## 2021-08-31 VITALS
SYSTOLIC BLOOD PRESSURE: 132 MMHG | WEIGHT: 180 LBS | HEART RATE: 91 BPM | DIASTOLIC BLOOD PRESSURE: 80 MMHG | BODY MASS INDEX: 29.99 KG/M2 | OXYGEN SATURATION: 95 % | HEIGHT: 65 IN

## 2021-08-31 DIAGNOSIS — I10 ESSENTIAL HYPERTENSION: ICD-10-CM

## 2021-08-31 DIAGNOSIS — R01.1 MURMUR: ICD-10-CM

## 2021-08-31 DIAGNOSIS — E78.2 MIXED HYPERLIPIDEMIA: ICD-10-CM

## 2021-08-31 DIAGNOSIS — Z72.0 TOBACCO USE: ICD-10-CM

## 2021-08-31 DIAGNOSIS — I73.9 PAD (PERIPHERAL ARTERY DISEASE) (HCC): Primary | ICD-10-CM

## 2021-08-31 DIAGNOSIS — I71.40 ABDOMINAL AORTIC ANEURYSM (AAA) WITHOUT RUPTURE: ICD-10-CM

## 2021-08-31 PROCEDURE — 99214 OFFICE O/P EST MOD 30 MIN: CPT | Performed by: INTERNAL MEDICINE

## 2021-08-31 NOTE — PATIENT INSTRUCTIONS
Patient Education        Stopping Smoking: Care Instructions  Your Care Instructions     Cigarette smokers crave the nicotine in cigarettes. Giving it up is much harder than simply changing a habit. Your body has to stop craving the nicotine. It is hard to quit, but you can do it. There are many tools that people use to quit smoking. You may find that combining tools works best for you. There are several steps to quitting. First you get ready to quit. Then you get support to help you. After that, you learn new skills and behaviors to become a nonsmoker. For many people, a necessary step is getting and using medicine. Your doctor will help you set up the plan that best meets your needs. You may want to attend a smoking cessation program to help you quit smoking. When you choose a program, look for one that has proven success. Ask your doctor for ideas. You will greatly increase your chances of success if you take medicine as well as get counseling or join a cessation program.  Some of the changes you feel when you first quit tobacco are uncomfortable. Your body will miss the nicotine at first, and you may feel short-tempered and grumpy. You may have trouble sleeping or concentrating. Medicine can help you deal with these symptoms. You may struggle with changing your smoking habits and rituals. The last step is the tricky one: Be prepared for the smoking urge to continue for a time. This is a lot to deal with, but keep at it. You will feel better. Follow-up care is a key part of your treatment and safety. Be sure to make and go to all appointments, and call your doctor if you are having problems. It's also a good idea to know your test results and keep a list of the medicines you take. How can you care for yourself at home? · Ask your family, friends, and coworkers for support. You have a better chance of quitting if you have help and support.   · Join a support group, such as Nicotine Anonymous, for people who are trying to quit smoking. · Consider signing up for a smoking cessation program, such as the American Lung Association's Freedom from Smoking program.  · Get text messaging support. Go to the website at www.smokefree. gov to sign up for the [facility] program.  · Set a quit date. Pick your date carefully so that it is not right in the middle of a big deadline or stressful time. Once you quit, do not even take a puff. Get rid of all ashtrays and lighters after your last cigarette. Clean your house and your clothes so that they do not smell of smoke. · Learn how to be a nonsmoker. Think about ways you can avoid those things that make you reach for a cigarette. ? Avoid situations that put you at greatest risk for smoking. For some people, it is hard to have a drink with friends without smoking. For others, they might skip a coffee break with coworkers who smoke. ? Change your daily routine. Take a different route to work or eat a meal in a different place. · Cut down on stress. Calm yourself or release tension by doing an activity you enjoy, such as reading a book, taking a hot bath, or gardening. · Talk to your doctor or pharmacist about nicotine replacement therapy, which replaces the nicotine in your body. You still get nicotine but you do not use tobacco. Nicotine replacement products help you slowly reduce the amount of nicotine you need. These products come in several forms, many of them available over-the-counter:  ? Nicotine patches  ? Nicotine gum and lozenges  ? Nicotine inhaler  · Ask your doctor about bupropion (Wellbutrin) or varenicline (Chantix), which are prescription medicines. They do not contain nicotine. They help you by reducing withdrawal symptoms, such as stress and anxiety. · Some people find hypnosis, acupuncture, and massage helpful for ending the smoking habit. · Eat a healthy diet and get regular exercise.  Having healthy habits will help your body move past its craving for nicotine. · Be prepared to keep trying. Most people are not successful the first few times they try to quit. Do not get mad at yourself if you smoke again. Make a list of things you learned and think about when you want to try again, such as next week, next month, or next year. Where can you learn more? Go to https://[employer]anna[employer]velia.SocialStay. org and sign in to your [employer] account. Enter L857 in the [place] [employer] box to learn more about \"Stopping Smoking: Care Instructions. \"     If you do not have an account, please click on the \"Sign Up Now\" link. Current as of: February 11, 2021               Content Version: 12.9  © 2006-2021 Healthwise[employer]. Care instructions adapted under license by [facility] ([facility]). If you have questions about a medical condition or this instruction, always ask your healthcare professional. [address] any warranty or liability for your use of this information.

## 2021-11-03 NOTE — PROGRESS NOTES
[address]  Cardiology Progress Note    David Opsina  1950    November 3, 2021      Reason for Referral: PAD     CC: \"I am okay. \"     Subjective:     History of Present Illness:    David Ospina is a 66 y.o. patient with a PMH significant for PAD with known right SFA occlusion hyperlipidemia, hypertension and type 2 diabetes who presents today for management of PAD. She was admitted to [facility] with worsening claudication and underwent a peripheral angiogram. She had EKOS and thrombectomy of left femoropopliteal graft. Today, she is here for follow up. She denies any increased pain in her legs. She is able to walk without limitations. At rest she does have intermittent pain in right leg. Patient denies exertional chest pain/pressure, dyspnea at rest, PIMENTEL, PND, orthopnea, palpitations, lightheadedness, weight changes, changes in LE edema, and syncope. Patient reports compliance to her medications. Past Medical History:   has a past medical history of Arthritis, Asthma, Back pain, CAD (coronary artery disease), Cellulitis, COPD (chronic obstructive pulmonary disease) ([address] Depression, Diabetes mellitus ([address] Enlarging abdominal aortic aneurysm (AAA) ([address] History of femoral angiogram, Hyperlipidemia, Hypertension, and PVD (peripheral vascular disease) ([address] Surgical History:   has a past surgical history that includes Cholecystectomy; hernia repair; other surgical history (Left, 9-); vascular surgery (Left); Abscess Drainage (Right, 2/19/16); Cardiac catheterization; and Abdominal aortic aneurysm repair (N/A, 10/16/2020). Social History:   reports that she has been smoking cigarettes. She has a 22.50 pack-year smoking history. She has never used smokeless tobacco. She reports current alcohol use of about 3.8 standard drinks of alcohol per week. She reports that she does not use drugs. Family History:  family history includes Heart Surgery in her father.     Home Medications:  Were reviewed and are listed in nursing record and/or below  Prior to Admission medications    Medication Sig Start Date End Date Taking? Authorizing Provider   Marcia Javier [id number] MCG/INH AEPB  7/21/21   Historical Provider, MD   clopidogrel (PLAVIX) 75 MG tablet TAKE 1 TABLET BY MOUTH EVERY DAY 8/31/20   Catalina Howard MD   gabapentin (NEURONTIN) 300 MG capsule Take 1 capsule by mouth 2 times daily for 180 days. Intended supply: 30 days 8/31/20 7/23/21  Catalina Howard MD   ELIQUIS 5 MG TABS tablet TAKE 1 TABLET BY MOUTH TWICE A DAY  Patient taking differently: 5 mg 2 times daily  6/9/20   Tacos Nichols MD   acetaminophen (TYLENOL) 500 MG tablet Take 500 mg by mouth every 6 hours as needed for Pain    Historical Provider, MD   metFORMIN (GLUCOPHAGE) 500 MG tablet Take 500 mg by mouth as needed Pt states she takes as needed/ she goes back and forth with taking one a day \"here and there\"    Historical Provider, MD   amLODIPine (NORVASC) 5 MG tablet Take 5 mg by mouth 2 times daily     Historical Provider, MD   pravastatin (PRAVACHOL) 40 MG tablet Take 40 mg by mouth daily. Historical Provider, MD      Allergies:  Ciprocinonide [fluocinolone]     Review of Systems: all reviewed and refer to HPI   · Constitutional: no unanticipated weight loss. There's been no change in energy level, sleep pattern, or activity level. No fevers, chills. · Eyes: No visual changes or diplopia. No scleral icterus. · ENT: No Headaches, hearing loss or vertigo. No mouth sores or sore throat. · Cardiovascular: No Chest pain, tightness or discomfort.  No Shortness of breath. No Dyspnea on exertion, Orthopnea, Paroxysmal nocturnal dyspnea or breathlessness at rest.   No Palpitations.  No Syncope ('blackouts', 'faints', 'collapse') or dizziness. · Respiratory: No cough or wheezing, no sputum production. No hematemesis. · Gastrointestinal: No abdominal pain, appetite loss, blood in stools.  No change in bowel or bladder habits. · Genitourinary: No dysuria, trouble voiding, or hematuria. · Musculoskeletal:  No gait disturbance, no joint complaints. · Integumentary: No rash or pruritis. · Neurological: No headache, diplopia, change in muscle strength, numbness or tingling. · Psychiatric: No anxiety or depression. · Endocrine: No temperature intolerance. No excessive thirst, fluid intake, or urination. No tremor. · Hematologic/Lymphatic: No abnormal bruising or bleeding, blood clots or swollen lymph nodes. · Allergic/Immunologic: No nasal congestion or hives. · Vascular: BLE edema and pain       Objective:     PHYSICAL EXAM:      There were no vitals filed for this visit. General Appearance:  Alert, cooperative, no distress, appears stated age. Head:  Normocephalic, without obvious abnormality, atraumatic. Eyes:  Pupils equal and round. No scleral icterus. Mouth: Moist mucosa, no pharyngeal erythema. Nose: Nares normal. No drainage or sinus tenderness. Neck: Supple, symmetrical, trachea midline. No adenopathy. No tenderness/mass/nodules. No carotid bruit or elevated JVD. Lungs:   Respiratory Effort: Normal   Auscultation: Clear to auscultation bilaterally, respirations unlabored. No wheeze, rales   Chest Wall:  No tenderness or deformity. Cardiovascular:    Pulses  Palpation: normal   Ascultation: Regular rate, S1/ S2 normal. No murmur, rub, or gallop. 2+ radial and pedal pulses, symmetric  Carotid  Femoral  + doppler PT, DP on right but - none on left   Abdomen and Gastrointestinal:   Soft, non-tender, bowel sounds active. Liver and Spleen  Masses   Musculoskeletal: No muscle wasting  Back  Gait   Extremities: Extremities normal, atraumatic. No cyanosis or edema. No cyanosis clubbing       Skin: Inspection and palpation performed, no rashes or lesions. Pysch: Normal mood and affect.  Alert and oriented to time place person   Neurologic: Normal gross motor and sensory exam.       Labs function. Trivial mitral regurgitation. The aortic valve leaflets are not well visualized but appear thickened. No  stenosis based on doppler velocity/gradient. Peripheral [facility] 12/30/14 ([facility])    Renal arteries are widely patent. Infrarenal aorta is ectatic. May have a small aneurysm.  The  common iliac, external and internal iliac arteries are patent. There is some mild disease but no high-grade stenosis noted. Right common femoral, profunda femoral, superficial  Femoral\arteries are patent. Distal SFA is occluded. Reconstitution of the popliteal and the  patient has two-vessel runoff on the right. Left common femoral and profunda femoral arteries  patent.  The patient has a fem below-the-knee pop bypass which appears widely patent.  Multiple views were obtained at both the proximal and distal anastomosis and I am unable to see any significant stenosis. The flow does appear to be slightly sluggish  compared to the right side, however, I do not see any evidence of stenosis. The patient has two vessel runoff to the left lower extremity. Peripheral angiogram 4/28/15 (Dr. Claudia Farley)   Irregular right superficial femoral artery through the majority of its course without hemodynamically significant stenoses. The right superficial femoral artery and proximal above knee popliteal artery were heavily diseased and occluded for a distance of 3 cm at the adductor canal.  The right popliteal artery reconstituted at that level with widely patent below knee  runoff through the posterior tibial and peroneal arteries. Unsuccessful attempt at recanalization and angioplasty of the right  superficial femoral/popliteal artery occlusion. Peripheral angiogram 10/21/19  Abdominal aorta has small aneurysm present. It is tortuous. Patent mesenteric and renals. Left and right common iliac arteries and external iliac arteries are patent. Left common femoral artery and profunda are patent.   Left superficial femoral artery tissues but no abscess.       5.6 cm saccular abdominal aortic aneurysm. Recommend referral to a vascular   specialist.     BLE arterial Doppler 8/24/2021  Right Impression   Right KIYA 0.81. This is consistent with mild PAD at rest.   Elevated velocity of the mid superficial femoral suggests a less than 50%   stenosis. Possible occlusion of the right peroneal artery. Left Impression   Left KIYA 0.58. This is consistent with moderate PAD at rest.   Possible occlusion of the proximal popliteal artery. Occlusion of the left femoral- popliteal arterial bypass graft. Elevated velocity of the proximal superficial femoral artery suggests a less   than 50% stenosis. Assessment and Plan     Peripheral Arterial Disease and Claudication  Occlusion of the femoropopliteal graft with popliteal artery occlusion. Intermittent pain. Continue Asa, and Plavix. Discontinue Eliquis. I have discussed with her options of peripheral angiogram or medical management. She wishes to continue medical management. She will call if she wishes to proceed with peripheral angiogram. Encouraged regular walking program.     Abdominal aortic aneurysm   S/p open repair 10/16/2020. Managed by Dr Yanet Hogan. Hyperlipidemia, mixed   Continue Pravachol 40 mg daily. Essential Hypertension  Controlled. Continue current medical management. Murmur  Asymptomatic. Nicotine Addiction. Occasionally smokes. I have spent 3-5 minutes on smoking cessation. Diabetes   Managed per PCP      Follow up in 6 months. Thank you for allowing us to participate in the care of Essie Portillo. Please do not hesitate to contact me if you have any questions.     Julian Urbina MD, MPH    [facility], [address] [place] [address]  Ph: [phone]  Fax: [phone]    This note was scribed in the presence of Dr Deidra Bell, by Spencer Jones RN  Physician Attestation:  The scribes documentation has been prepared under my direction and personally reviewed by me in its entirety. I confirm that the note above accurately reflects all work, treatment, procedures, and medical decision making performed by me.

## 2021-11-04 ENCOUNTER — OFFICE VISIT (OUTPATIENT)
Dept: CARDIOLOGY CLINIC | Age: 71
End: 2021-11-04
Payer: MEDICARE

## 2021-11-04 VITALS
DIASTOLIC BLOOD PRESSURE: 66 MMHG | OXYGEN SATURATION: 95 % | BODY MASS INDEX: 29.99 KG/M2 | WEIGHT: 180 LBS | HEIGHT: 65 IN | SYSTOLIC BLOOD PRESSURE: 120 MMHG | HEART RATE: 98 BPM

## 2021-11-04 DIAGNOSIS — I71.40 ABDOMINAL AORTIC ANEURYSM (AAA) WITHOUT RUPTURE: ICD-10-CM

## 2021-11-04 DIAGNOSIS — E78.2 MIXED HYPERLIPIDEMIA: ICD-10-CM

## 2021-11-04 DIAGNOSIS — R01.1 MURMUR: ICD-10-CM

## 2021-11-04 DIAGNOSIS — I10 ESSENTIAL HYPERTENSION: ICD-10-CM

## 2021-11-04 DIAGNOSIS — I73.9 PAD (PERIPHERAL ARTERY DISEASE) (HCC): Primary | ICD-10-CM

## 2021-11-04 PROCEDURE — 99213 OFFICE O/P EST LOW 20 MIN: CPT | Performed by: INTERNAL MEDICINE

## 2021-11-04 RX ORDER — AMOXICILLIN 500 MG/1
CAPSULE ORAL
COMMUNITY
Start: 2021-11-02 | End: 2022-01-25 | Stop reason: ALTCHOICE

## 2021-11-04 NOTE — PATIENT INSTRUCTIONS
Patient Education        Stopping Smoking: Care Instructions  Your Care Instructions     Cigarette smokers crave the nicotine in cigarettes. Giving it up is much harder than simply changing a habit. Your body has to stop craving the nicotine. It is hard to quit, but you can do it. There are many tools that people use to quit smoking. You may find that combining tools works best for you. There are several steps to quitting. First you get ready to quit. Then you get support to help you. After that, you learn new skills and behaviors to become a nonsmoker. For many people, a necessary step is getting and using medicine. Your doctor will help you set up the plan that best meets your needs. You may want to attend a smoking cessation program to help you quit smoking. When you choose a program, look for one that has proven success. Ask your doctor for ideas. You will greatly increase your chances of success if you take medicine as well as get counseling or join a cessation program.  Some of the changes you feel when you first quit tobacco are uncomfortable. Your body will miss the nicotine at first, and you may feel short-tempered and grumpy. You may have trouble sleeping or concentrating. Medicine can help you deal with these symptoms. You may struggle with changing your smoking habits and rituals. The last step is the tricky one: Be prepared for the smoking urge to continue for a time. This is a lot to deal with, but keep at it. You will feel better. Follow-up care is a key part of your treatment and safety. Be sure to make and go to all appointments, and call your doctor if you are having problems. It's also a good idea to know your test results and keep a list of the medicines you take. How can you care for yourself at home? · Ask your family, friends, and coworkers for support. You have a better chance of quitting if you have help and support.   · Join a support group, such as Nicotine Anonymous, for people who are trying to quit smoking. · Consider signing up for a smoking cessation program, such as the American Lung Association's Freedom from Smoking program.  · Get text messaging support. Go to the website at www.smokefree. gov to sign up for the [facility] program.  · Set a quit date. Pick your date carefully so that it is not right in the middle of a big deadline or stressful time. Once you quit, do not even take a puff. Get rid of all ashtrays and lighters after your last cigarette. Clean your house and your clothes so that they do not smell of smoke. · Learn how to be a nonsmoker. Think about ways you can avoid those things that make you reach for a cigarette. ? Avoid situations that put you at greatest risk for smoking. For some people, it is hard to have a drink with friends without smoking. For others, they might skip a coffee break with coworkers who smoke. ? Change your daily routine. Take a different route to work or eat a meal in a different place. · Cut down on stress. Calm yourself or release tension by doing an activity you enjoy, such as reading a book, taking a hot bath, or gardening. · Talk to your doctor or pharmacist about nicotine replacement therapy, which replaces the nicotine in your body. You still get nicotine but you do not use tobacco. Nicotine replacement products help you slowly reduce the amount of nicotine you need. These products come in several forms, many of them available over-the-counter:  ? Nicotine patches  ? Nicotine gum and lozenges  ? Nicotine inhaler  · Ask your doctor about bupropion (Wellbutrin) or varenicline (Chantix), which are prescription medicines. They do not contain nicotine. They help you by reducing withdrawal symptoms, such as stress and anxiety. · Some people find hypnosis, acupuncture, and massage helpful for ending the smoking habit. · Eat a healthy diet and get regular exercise.  Having healthy habits will help your body move past its craving for nicotine. · Be prepared to keep trying. Most people are not successful the first few times they try to quit. Do not get mad at yourself if you smoke again. Make a list of things you learned and think about when you want to try again, such as next week, next month, or next year. Where can you learn more? Go to https://adQpebriannaew[employer]. org and sign in to your [employer] account. Enter H546 in the [employer] box to learn more about \"Stopping Smoking: Care Instructions. \"     If you do not have an account, please click on the \"Sign Up Now\" link. Current as of: February 11, 2021               Content Version: 13.0  © 2006-2021 Healthwise, Incorporated. Care instructions adapted under license by [facility] ([facility]). If you have questions about a medical condition or this instruction, always ask your healthcare professional. [address] any warranty or liability for your use of this information.

## 2021-12-03 ENCOUNTER — HOSPITAL ENCOUNTER (EMERGENCY)
Age: 71
Discharge: HOME | End: 2021-12-03
Attending: EMERGENCY MEDICINE
Payer: MEDICARE

## 2021-12-03 ENCOUNTER — APPOINTMENT (OUTPATIENT)
Dept: CT IMAGING | Age: 71
End: 2021-12-03
Payer: MEDICARE

## 2021-12-03 VITALS
DIASTOLIC BLOOD PRESSURE: 90 MMHG | WEIGHT: 188.93 LBS | RESPIRATION RATE: 16 BRPM | BODY MASS INDEX: 31.48 KG/M2 | HEIGHT: 65 IN | TEMPERATURE: 97.8 F | SYSTOLIC BLOOD PRESSURE: 148 MMHG | HEART RATE: 78 BPM | OXYGEN SATURATION: 100 %

## 2021-12-03 DIAGNOSIS — K59.01 SLOW TRANSIT CONSTIPATION: Primary | ICD-10-CM

## 2021-12-03 LAB
A/G RATIO: 1.2 (ref 1.1–2.2)
ALBUMIN SERPL-MCNC: 3.8 G/DL (ref 3.4–5)
ALP BLD-CCNC: 96 U/L (ref 40–129)
ALT SERPL-CCNC: 16 U/L (ref 10–40)
ANION GAP SERPL CALCULATED.3IONS-SCNC: 13 MMOL/L (ref 3–16)
AST SERPL-CCNC: 26 U/L (ref 15–37)
BASOPHILS ABSOLUTE: 0.1 K/UL (ref 0–0.2)
BASOPHILS RELATIVE PERCENT: 0.6 %
BILIRUB SERPL-MCNC: 0.3 MG/DL (ref 0–1)
BILIRUBIN URINE: NEGATIVE
BLOOD, URINE: NEGATIVE
BUN BLDV-MCNC: 10 MG/DL (ref 7–20)
CALCIUM SERPL-MCNC: 9.3 MG/DL (ref 8.3–10.6)
CHLORIDE BLD-SCNC: 97 MMOL/L (ref 99–110)
CLARITY: CLEAR
CO2: 21 MMOL/L (ref 21–32)
COLOR: YELLOW
CREAT SERPL-MCNC: 1 MG/DL (ref 0.6–1.2)
EOSINOPHILS ABSOLUTE: 0.2 K/UL (ref 0–0.6)
EOSINOPHILS RELATIVE PERCENT: 2.3 %
GFR AFRICAN AMERICAN: >60
GFR NON-AFRICAN AMERICAN: 54
GLUCOSE BLD-MCNC: 96 MG/DL (ref 70–99)
GLUCOSE URINE: NEGATIVE MG/DL
HCT VFR BLD CALC: 49 % (ref 36–48)
HEMOGLOBIN: 15.8 G/DL (ref 12–16)
KETONES, URINE: NEGATIVE MG/DL
LEUKOCYTE ESTERASE, URINE: NEGATIVE
LIPASE: 28 U/L (ref 13–60)
LYMPHOCYTES ABSOLUTE: 2.2 K/UL (ref 1–5.1)
LYMPHOCYTES RELATIVE PERCENT: 27.9 %
MCH RBC QN AUTO: 30 PG (ref 26–34)
MCHC RBC AUTO-ENTMCNC: 32.2 G/DL (ref 31–36)
MCV RBC AUTO: 93.1 FL (ref 80–100)
MICROSCOPIC EXAMINATION: NORMAL
MONOCYTES ABSOLUTE: 0.5 K/UL (ref 0–1.3)
MONOCYTES RELATIVE PERCENT: 6.6 %
NEUTROPHILS ABSOLUTE: 5 K/UL (ref 1.7–7.7)
NEUTROPHILS RELATIVE PERCENT: 62.6 %
NITRITE, URINE: NEGATIVE
PDW BLD-RTO: 14.6 % (ref 12.4–15.4)
PH UA: 6.5 (ref 5–8)
PLATELET # BLD: 203 K/UL (ref 135–450)
PMV BLD AUTO: 8 FL (ref 5–10.5)
POTASSIUM REFLEX MAGNESIUM: 4.7 MMOL/L (ref 3.5–5.1)
PROTEIN UA: NEGATIVE MG/DL
RBC # BLD: 5.26 M/UL (ref 4–5.2)
SODIUM BLD-SCNC: 131 MMOL/L (ref 136–145)
SPECIFIC GRAVITY UA: 1.01 (ref 1–1.03)
TOTAL PROTEIN: 7 G/DL (ref 6.4–8.2)
URINE REFLEX TO CULTURE: NORMAL
URINE TYPE: NORMAL
UROBILINOGEN, URINE: 0.2 E.U./DL
WBC # BLD: 8 K/UL (ref 4–11)

## 2021-12-03 PROCEDURE — 6360000004 HC RX CONTRAST MEDICATION: Performed by: EMERGENCY MEDICINE

## 2021-12-03 PROCEDURE — 85025 COMPLETE CBC W/AUTO DIFF WBC: CPT

## 2021-12-03 PROCEDURE — 83690 ASSAY OF LIPASE: CPT

## 2021-12-03 PROCEDURE — 74177 CT ABD & PELVIS W/CONTRAST: CPT

## 2021-12-03 PROCEDURE — 36415 COLL VENOUS BLD VENIPUNCTURE: CPT

## 2021-12-03 PROCEDURE — 80053 COMPREHEN METABOLIC PANEL: CPT

## 2021-12-03 PROCEDURE — 2500000003 HC RX 250 WO HCPCS: Performed by: EMERGENCY MEDICINE

## 2021-12-03 PROCEDURE — 2580000003 HC RX 258: Performed by: EMERGENCY MEDICINE

## 2021-12-03 PROCEDURE — 99283 EMERGENCY DEPT VISIT LOW MDM: CPT

## 2021-12-03 PROCEDURE — 81003 URINALYSIS AUTO W/O SCOPE: CPT

## 2021-12-03 RX ORDER — POLYETHYLENE GLYCOL 3350, SODIUM CHLORIDE, POTASSIUM CHLORIDE, SODIUM BICARBONATE, AND SODIUM SULFATE 240; 5.84; 2.98; 6.72; 22.72 G/4L; G/4L; G/4L; G/4L; G/4L
4000 POWDER, FOR SOLUTION ORAL ONCE
Qty: 4000 ML | Refills: 0 | Status: SHIPPED | OUTPATIENT
Start: 2021-12-03 | End: 2021-12-03

## 2021-12-03 RX ORDER — 0.9 % SODIUM CHLORIDE 0.9 %
1000 INTRAVENOUS SOLUTION INTRAVENOUS ONCE
Status: COMPLETED | OUTPATIENT
Start: 2021-12-03 | End: 2021-12-03

## 2021-12-03 RX ORDER — MAGNESIUM GLYCINATE 100 MG
665 CAPSULE ORAL 2 TIMES DAILY
Qty: 20 CAPSULE | Refills: 0 | Status: SHIPPED | OUTPATIENT
Start: 2021-12-03 | End: 2021-12-13

## 2021-12-03 RX ADMIN — Medication 330 ML: at 19:30

## 2021-12-03 RX ADMIN — SODIUM CHLORIDE 1000 ML: 9 INJECTION, SOLUTION INTRAVENOUS at 17:26

## 2021-12-03 RX ADMIN — IOPAMIDOL 75 ML: 755 INJECTION, SOLUTION INTRAVENOUS at 17:05

## 2021-12-03 ASSESSMENT — PAIN DESCRIPTION - PAIN TYPE: TYPE: ACUTE PAIN

## 2021-12-03 ASSESSMENT — PAIN DESCRIPTION - LOCATION: LOCATION: ABDOMEN

## 2021-12-03 ASSESSMENT — PAIN SCALES - GENERAL: PAINLEVEL_OUTOF10: 6

## 2021-12-03 ASSESSMENT — PAIN DESCRIPTION - DESCRIPTORS: DESCRIPTORS: DISCOMFORT;DULL

## 2021-12-04 NOTE — ED NOTES
Small results from [facility]. Patient requests to go home. Dr. Kelley Neves aware.      Donna Dee, RN  12/03/21 2004

## 2021-12-05 NOTE — ED PROVIDER NOTES
[facility]      Pt Name: Keila Carrera  MRN: [id number]  [place] 1950  Date of evaluation: 12/3/2021  Provider: Laura Sen DO    CHIEF COMPLAINT  Chief Complaint   Patient presents with    Constipation     states she has not had a decent BM in a long time, states she is waiting for GI to call her back today to schedule a colonscopy. states its brown water. she is taking a laxative on a normal basis and being treated per her PCP. concerned for obstruction vs impaction        I wore personal protective equipment when I was in the room the entire time. This includes gloves, N95 mask, face shield, and a glove over my stethoscope for protection. HPI  Keila Carrera is a 66 y.o. female who presents with constipation has been present for 2 to 3 months. She is not had normal bowel movement 2 to 3 months. She states that she is have been having small bowel movements and liquid stools. She is concerned she might have obstruction. She is scheduled to see her GI doctor but has not called her back today. She is waiting for colonoscopy. She states she is have abdominal cramping. She denies any blood in her stools. She denies any other complaints at this time. ? REVIEW OF SYSTEMS  All systems negative except as noted in the HPI. Reviewed Nurses' notes and concur. No LMP recorded.  Patient is postmenopausal.    PAST MEDICAL HISTORY  Past Medical History:   Diagnosis Date    Arthritis     osteoarthritis    Asthma     Back pain     CAD (coronary artery disease)     Cellulitis     COPD (chronic obstructive pulmonary disease) [facility]     pt unsure    Depression     Diabetes mellitus ([facility]     Enlarging abdominal aortic aneurysm (AAA) ([facility])     History of femoral angiogram 4/30/2015    Hyperlipidemia     Hypertension     PVD (peripheral vascular disease) ([facility]        FAMILY HISTORY  Family History   Problem Relation Age of Onset    Heart Surgery Father        SOCIAL HISTORY   reports that she has been smoking cigarettes. She has a 22.50 pack-year smoking history. She has never used smokeless tobacco. She reports current alcohol use of about 3.8 standard drinks of alcohol per week. She reports that she does not use drugs. SURGICAL HISTORY  Past Surgical History:   Procedure Laterality Date    ABDOMINAL AORTIC ANEURYSM REPAIR N/A 10/16/2020    OPEN REPAIR ABDOMINAL AORTIC ANEURYSM performed by Emma Mclean MD at [address] Right 2/19/16    I and D right gluteal abscess    CARDIAC CATHETERIZATION      CHOLECYSTECTOMY      HERNIA REPAIR      OTHER SURGICAL HISTORY Left 9-    excisional debridement 3.5 cm x 5 cm abscess    VASCULAR SURGERY Left     fem-tib       CURRENT MEDICATIONS  Current Outpatient Rx   Medication Sig Dispense Refill    Magnesium Glycinate 665 MG CAPS Take 665 mg by mouth 2 times daily for 10 days 20 capsule 0    amoxicillin (AMOXIL) 500 MG capsule       TRELEGY ELLIPTA 100-62.5-25 MCG/INH AEPB       clopidogrel (PLAVIX) 75 MG tablet TAKE 1 TABLET BY MOUTH EVERY DAY 30 tablet 3    gabapentin (NEURONTIN) 300 MG capsule Take 1 capsule by mouth 2 times daily for 180 days. Intended supply: 30 days 180 capsule 0    acetaminophen (TYLENOL) 500 MG tablet Take 500 mg by mouth every 6 hours as needed for Pain      metFORMIN (GLUCOPHAGE) 500 MG tablet Take 500 mg by mouth as needed Pt states she takes as needed/ she goes back and forth with taking one a day \"here and there\"      amLODIPine (NORVASC) 5 MG tablet Take 5 mg by mouth 2 times daily       pravastatin (PRAVACHOL) 40 MG tablet Take 40 mg by mouth daily. ALLERGIES  Allergies   Allergen Reactions    Ciprocinonide [Fluocinolone]      Pt does not know       Tetanus vaccination status reviewed: tetanus re-vaccination not indicated.     PHYSICAL EXAM  VITAL SIGNS: BP (!) 148/90   Pulse 78   Temp 97.8 °F (36.6 °C) (Oral)   Resp 16   Ht 5' 5\" (1.651 m)   Wt 188 lb 15 oz (85.7 kg)   SpO2 100%   BMI 31.44 kg/m²   Constitutional: Well-developed, well-nourished, appears normal, nontoxic, activity: Sitting on the cart, appears mildly uncomfortable, does not appear ill or toxic. She is speaking full sentences. HENT: Normocephalic, Atraumatic, Bilateral external ears normal, TM's were normal, Mucus membranes are moist and oropharynx is patent and clear, No oral exudates, Nose normal.  Eyes:  Conjunctiva normal, No discharge. No scleral icterus. Neck: Normal range of motion, No tenderness, Supple. Lymphatic: No lymphadenopathy noted. Cardiovascular: Normal heart rate, Normal rhythm, no murmurs, no gallops, no rubs. Thorax & Lungs: Normal breath sounds, no respiratory distress, no wheezing, no rales, no rhonchi  Abdomen: Soft, Nontender, No hepatosplenomegaly, No masses, No pulsatile masses, mild distension and mild tympany in the bilateral upper quadrants, normal bowel sounds  Skin: Warm, Dry, No erythema, No rash. Musculoskeletal:  no major deformities noted.   Neurologic: Alert & oriented x 3  Psychiatric: Affect normal, Mood mildly anxious but otherwise normal.    ?  LABORATORY  Labs Reviewed   COMPREHENSIVE METABOLIC PANEL W/ REFLEX TO MG FOR LOW K - Abnormal; Notable for the following components:       Result Value    Sodium 131 (*)     Chloride 97 (*)     GFR Non- 54 (*)     All other components within normal limits    Narrative:     Performed at:  [facility]  [address] [place], [place] [address]   Phone [phone]   CBC WITH AUTO DIFFERENTIAL - Abnormal; Notable for the following components:    RBC 5.26 (*)     Hematocrit 49.0 (*)     All other components within normal limits    Narrative:     Performed at:  [facility]  [address] [place], [place] [address]   Phone [phone]   LIPASE    Narrative: results with that but not great results. She wanted to go home. She will follow up with her GI doctor and return if any problems. She was prescribed GoLYTELY. The patient's blood pressure was found to be elevated according to CMS/Medicare and the Affordable Care Act/[facility] criteria. Elevated blood pressure could occur because of pain or anxiety or other reasons and does not mean that they need to have their blood pressure treated or medications otherwise adjusted. However, this could also be a sign that they will need to have their blood pressure treated or medications changed. The patient was instructed to follow up closely with their personal physician to have their blood pressure rechecked. The patient was instructed to take a list of recent blood pressure readings to their next visit with their personal physician. See discharge instructions for specific medications, discharge information, and treatments. They were verbally instructed to return to emergency if any problems. (This chart has been completed using [address]. Although attempts have been made to ensure accuracy, words and/or phrases may not be transcribed as intended.)    Patient refused pain medicines at the time of their exam.    IMPRESSION(S):  1. Slow transit constipation        ? Recheck Times: 2000    Diagnostic considerations include but are not limited to:   Abdominal Aortic Aneurysm, Acute Coronary Syndrome, Ischemic Bowel, Bowel Obstruction (including Gastric Outlet Obstruction), PUD, GERD, Acute Cholecystitis, Pancreatitis, Hepatitis, Colitis, SMA Syndrome, Mesenteric Steal Syndrome, Splanchnic Vein Thrombosis, other         Sandhya DO Aki  12/04/21 [id number]

## 2022-01-10 ENCOUNTER — TELEPHONE (OUTPATIENT)
Dept: CARDIOLOGY CLINIC | Age: 72
End: 2022-01-10

## 2022-01-10 NOTE — TELEPHONE ENCOUNTER
CARDIAC CLEARANCE     Procedure: colonoscopy  : Dr. Perez [place]   Date: 02/03/2022    Medications needing held: Plavix 75 mg     How long?:Up to Five days    Phone Number [phone]   and Contact Name for Physicians office: N/A  Fax number to send information: [phone]    Clinical staff:    Cardiologist: Dr. Melody Morel  Last Appointment: 11/4/2021  Next Appointment: 02/11/2022  Cardiac History:        Peripheral Arterial Disease and Claudication  Occlusion of the femoropopliteal graft with popliteal artery occlusion. Intermittent pain. Continue Asa, and Plavix. Discontinue Eliquis. I have discussed with her options of peripheral angiogram or medical management. She wishes to continue medical management. She will call if she wishes to proceed with peripheral angiogram. Encouraged regular walking program.      Abdominal aortic aneurysm   S/p open repair 10/16/2020. Managed by Dr Karthik Ruiz.     Hyperlipidemia, mixed   Continue Pravachol 40 mg daily.      Essential Hypertension  Controlled. Continue current medical management.      Murmur  Asymptomatic.      Nicotine Addiction. Occasionally smokes.  I have spent 3-5 minutes on smoking cessation.      Diabetes Managed per PCP

## 2022-01-11 NOTE — TELEPHONE ENCOUNTER
Wendie Began from [address] called back in this afternoon stating that Dr. Alexandra Quigley will need a letter from Dr. Anne Vallejo, please have it say she can hold her Plavix.      You can fax the letter to #[phone]  Attention Mary Ann

## 2022-02-04 ENCOUNTER — ANESTHESIA EVENT (OUTPATIENT)
Dept: ENDOSCOPY | Age: 72
End: 2022-02-04
Payer: MEDICARE

## 2022-02-07 ENCOUNTER — ANESTHESIA (OUTPATIENT)
Dept: ENDOSCOPY | Age: 72
End: 2022-02-07
Payer: MEDICARE

## 2022-02-07 ENCOUNTER — HOSPITAL ENCOUNTER (OUTPATIENT)
Age: 72
Setting detail: OUTPATIENT SURGERY
Discharge: HOME | End: 2022-02-07
Attending: INTERNAL MEDICINE | Admitting: INTERNAL MEDICINE
Payer: MEDICARE

## 2022-02-07 VITALS
SYSTOLIC BLOOD PRESSURE: 142 MMHG | TEMPERATURE: 98.1 F | OXYGEN SATURATION: 98 % | WEIGHT: 189.38 LBS | BODY MASS INDEX: 31.55 KG/M2 | HEIGHT: 65 IN | HEART RATE: 84 BPM | DIASTOLIC BLOOD PRESSURE: 70 MMHG | RESPIRATION RATE: 12 BRPM

## 2022-02-07 VITALS — SYSTOLIC BLOOD PRESSURE: 201 MMHG | TEMPERATURE: 98.6 F | DIASTOLIC BLOOD PRESSURE: 86 MMHG | OXYGEN SATURATION: 99 %

## 2022-02-07 DIAGNOSIS — K59.00 CONSTIPATION, UNSPECIFIED CONSTIPATION TYPE: ICD-10-CM

## 2022-02-07 LAB
GLUCOSE BLD-MCNC: 120 MG/DL (ref 70–99)
GLUCOSE BLD-MCNC: 136 MG/DL (ref 70–99)
PERFORMED ON: ABNORMAL
PERFORMED ON: ABNORMAL

## 2022-02-07 PROCEDURE — 2580000003 HC RX 258: Performed by: NURSE ANESTHETIST, CERTIFIED REGISTERED

## 2022-02-07 PROCEDURE — 7100000001 HC PACU RECOVERY - ADDTL 15 MIN: Performed by: INTERNAL MEDICINE

## 2022-02-07 PROCEDURE — 7100000010 HC PHASE II RECOVERY - FIRST 15 MIN: Performed by: INTERNAL MEDICINE

## 2022-02-07 PROCEDURE — 2500000003 HC RX 250 WO HCPCS: Performed by: NURSE ANESTHETIST, CERTIFIED REGISTERED

## 2022-02-07 PROCEDURE — 6360000002 HC RX W HCPCS: Performed by: NURSE ANESTHETIST, CERTIFIED REGISTERED

## 2022-02-07 PROCEDURE — 2720000010 HC SURG SUPPLY STERILE: Performed by: INTERNAL MEDICINE

## 2022-02-07 PROCEDURE — 3700000001 HC ADD 15 MINUTES (ANESTHESIA): Performed by: INTERNAL MEDICINE

## 2022-02-07 PROCEDURE — 7100000011 HC PHASE II RECOVERY - ADDTL 15 MIN: Performed by: INTERNAL MEDICINE

## 2022-02-07 PROCEDURE — 7100000000 HC PACU RECOVERY - FIRST 15 MIN: Performed by: INTERNAL MEDICINE

## 2022-02-07 PROCEDURE — 3609010600 HC COLONOSCOPY POLYPECTOMY SNARE/COLD BIOPSY: Performed by: INTERNAL MEDICINE

## 2022-02-07 PROCEDURE — 3700000000 HC ANESTHESIA ATTENDED CARE: Performed by: INTERNAL MEDICINE

## 2022-02-07 PROCEDURE — 2709999900 HC NON-CHARGEABLE SUPPLY: Performed by: INTERNAL MEDICINE

## 2022-02-07 PROCEDURE — 88305 TISSUE EXAM BY PATHOLOGIST: CPT

## 2022-02-07 RX ORDER — SODIUM CHLORIDE 9 MG/ML
INJECTION, SOLUTION INTRAVENOUS CONTINUOUS PRN
Status: DISCONTINUED | OUTPATIENT
Start: 2022-02-07 | End: 2022-02-07 | Stop reason: SDUPTHER

## 2022-02-07 RX ORDER — LIDOCAINE HYDROCHLORIDE 20 MG/ML
INJECTION, SOLUTION INFILTRATION; PERINEURAL PRN
Status: DISCONTINUED | OUTPATIENT
Start: 2022-02-07 | End: 2022-02-07 | Stop reason: SDUPTHER

## 2022-02-07 RX ORDER — SODIUM CHLORIDE 0.9 % (FLUSH) 0.9 %
10 SYRINGE (ML) INJECTION EVERY 12 HOURS SCHEDULED
Status: DISCONTINUED | OUTPATIENT
Start: 2022-02-07 | End: 2022-02-07 | Stop reason: HOSPADM

## 2022-02-07 RX ORDER — SODIUM CHLORIDE 0.9 % (FLUSH) 0.9 %
10 SYRINGE (ML) INJECTION PRN
Status: DISCONTINUED | OUTPATIENT
Start: 2022-02-07 | End: 2022-02-07 | Stop reason: HOSPADM

## 2022-02-07 RX ORDER — SODIUM CHLORIDE 9 MG/ML
INJECTION, SOLUTION INTRAVENOUS CONTINUOUS
Status: DISCONTINUED | OUTPATIENT
Start: 2022-02-07 | End: 2022-02-07 | Stop reason: HOSPADM

## 2022-02-07 RX ORDER — SODIUM CHLORIDE 9 MG/ML
25 INJECTION, SOLUTION INTRAVENOUS PRN
Status: DISCONTINUED | OUTPATIENT
Start: 2022-02-07 | End: 2022-02-07 | Stop reason: HOSPADM

## 2022-02-07 RX ORDER — PROPOFOL 10 MG/ML
INJECTION, EMULSION INTRAVENOUS CONTINUOUS PRN
Status: DISCONTINUED | OUTPATIENT
Start: 2022-02-07 | End: 2022-02-07 | Stop reason: SDUPTHER

## 2022-02-07 RX ADMIN — PROPOFOL 200 MCG/KG/MIN: 10 INJECTION, EMULSION INTRAVENOUS at 08:05

## 2022-02-07 RX ADMIN — LIDOCAINE HYDROCHLORIDE 50 MG: 20 INJECTION, SOLUTION INFILTRATION; PERINEURAL at 08:04

## 2022-02-07 RX ADMIN — SODIUM CHLORIDE: 9 INJECTION, SOLUTION INTRAVENOUS at 07:14

## 2022-02-07 ASSESSMENT — PAIN SCALES - GENERAL
PAINLEVEL_OUTOF10: 0

## 2022-02-07 ASSESSMENT — PULMONARY FUNCTION TESTS
PIF_VALUE: 1
PIF_VALUE: 0
PIF_VALUE: 1
PIF_VALUE: 0
PIF_VALUE: 1

## 2022-02-07 ASSESSMENT — ENCOUNTER SYMPTOMS: SHORTNESS OF BREATH: 0

## 2022-02-07 ASSESSMENT — PAIN - FUNCTIONAL ASSESSMENT: PAIN_FUNCTIONAL_ASSESSMENT: 0-10

## 2022-02-07 NOTE — ANESTHESIA PRE PROCEDURE
[facility] Department of Anesthesiology  Pre-Anesthesia Evaluation/Consultation       Name:  Noris Castañeda  : 1950  Age:  66 y.o.                                            MRN:  [id number]  Date: 2022           Surgeon: Surgeon(s):  Jessica Lopez MD    Procedure: Procedure(s):  COLONOSCOPY     Allergies   Allergen Reactions    Ciprocinonide [Fluocinolone]      Pt does not know     Patient Active Problem List   Diagnosis    Atherosclerosis of native artery of extremity with intermittent claudication ([address]    Critical lower limb ischemia ([address]    PAD (peripheral artery disease) ([address]    Tobacco use    Essential hypertension    Leg pain    Enlarging abdominal aortic aneurysm (AAA) ([address]    AAA (abdominal aortic aneurysm) ([address]    Acute respiratory failure with hypoxia ([address]    Pleural effusion, bilateral    Atelectasis    COPD, moderate ([address]     Past Medical History:   Diagnosis Date    Arthritis     osteoarthritis    Back pain     CAD (coronary artery disease)     Cellulitis     COPD (chronic obstructive pulmonary disease) ([address]     pt unsure    Depression     Diabetes mellitus ([address]     Enlarging abdominal aortic aneurysm (AAA) ([address]     History of femoral angiogram 2015    Hyperlipidemia     Hypertension     PVD (peripheral vascular disease) ([address]      Past Surgical History:   Procedure Laterality Date    ABDOMINAL AORTIC ANEURYSM REPAIR N/A 10/16/2020    OPEN REPAIR ABDOMINAL AORTIC ANEURYSM performed by Cephas Hashimoto, MD at [address] [place] [address] Right 16    I and D right gluteal abscess    CARDIAC CATHETERIZATION      CHOLECYSTECTOMY      HERNIA REPAIR      OTHER SURGICAL HISTORY Left 2015    excisional debridement 3.5 cm x 5 cm abscess    VASCULAR SURGERY Left     fem-tib     Social History     Tobacco Use    Smoking status: Current Every Day Smoker     Packs/day: 0.50     Years: 45.00     Pack years: 22.50     Types: Cigarettes Last attempt to quit: 10/26/2019     Years since quittin.2    Smokeless tobacco: Never Used    Tobacco comment: been cutting back with electronic cig's-pt states she is vaping- but not often   Vaping Use    Vaping Use: Some days    Substances: Always   Substance Use Topics    Alcohol use: Yes     Alcohol/week: 3.8 standard drinks     Types: 3 Cans of beer, 1 Standard drinks or equivalent per week    Drug use: No     Medications  No current facility-administered medications on file prior to encounter. Current Outpatient Medications on File Prior to Encounter   Medication Sig Dispense Refill    linaclotide (LINZESS) 145 MCG capsule Take 145 mcg by mouth every morning (before breakfast)      acetaminophen (TYLENOL) 500 MG tablet Take 500 mg by mouth every 6 hours as needed for Pain      metFORMIN (GLUCOPHAGE) 500 MG tablet Take 500 mg by mouth as needed Pt states she takes as needed/ she goes back and forth with taking one a day \"here and there\"      amLODIPine (NORVASC) 5 MG tablet Take 5 mg by mouth 2 times daily       pravastatin (PRAVACHOL) 40 MG tablet Take 40 mg by mouth daily.  clopidogrel (PLAVIX) 75 MG tablet TAKE 1 TABLET BY MOUTH EVERY DAY (Patient taking differently: Dr. Yolanda Cuello to stop five days prior) 30 tablet 3    gabapentin (NEURONTIN) 300 MG capsule Take 1 capsule by mouth 2 times daily for 180 days. Intended supply: 30 days 180 capsule 0     No current facility-administered medications for this encounter.      Vital Signs (Current)   Vitals:    22   BP: (!) 164/72   Pulse: 85   Resp: 16   Temp: 97.1 °F (36.2 °C)   TempSrc: Temporal   SpO2: 95%   Weight: 189 lb 6 oz (85.9 kg)   Height: 5' 5\" (1.651 m)                                            Vital Signs Statistics (for past 48 hrs)     Temp  Av.1 °F (36.2 °C)  Min: 97.1 °F (36.2 °C)   Min taken time: 22 07  Max: 97.1 °F (36.2 °C)   Max taken time: 22  Pulse  Av  Min: 85   Min taken time: 22 3333  Max: 80   Max taken time: 22 07  Resp  Av  Min: 12   Min taken time: 22 07  Max: 12   Max taken time: 22  BP  Min: 164/72   Min taken time: 22 07  Max: 164/72   Max taken time: 22 07  SpO2  Av %  Min: 95 %   Min taken time: 22 07  Max: 95 %   Max taken time: 22 07  BP Readings from Last 3 Encounters:   22 (!) 164/72   21 (!) 148/90   21 120/66       BMI  Body mass index is 31.51 kg/m². Estimated body mass index is 31.51 kg/m² as calculated from the following:    Height as of this encounter: 5' 5\" (1.651 m). Weight as of this encounter: 189 lb 6 oz (85.9 kg). CBC   Lab Results   Component Value Date    WBC 8.0 2021    RBC 5.26 2021    HGB 15.8 2021    HCT 49.0 2021    MCV 93.1 2021    RDW 14.6 2021     2021     CMP    Lab Results   Component Value Date     2021    K 4.7 2021    CL 97 2021    CO2 21 2021    BUN 10 2021    CREATININE 1.0 2021    GFRAA >60 2021    GFRAA >60 03/15/2011    AGRATIO 1.2 2021    LABGLOM 54 2021    GLUCOSE 96 2021    PROT 7.0 2021    PROT 6.5 03/15/2011    CALCIUM 9.3 2021    BILITOT 0.3 2021    ALKPHOS 96 2021    AST 26 2021    ALT 16 2021     BMP    Lab Results   Component Value Date     2021    K 4.7 2021    CL 97 2021    CO2 21 2021    BUN 10 2021    CREATININE 1.0 2021    CALCIUM 9.3 2021    GFRAA >60 2021    GFRAA >60 03/15/2011    LABGLOM 54 2021    GLUCOSE 96 2021     POCGlucose  No results for input(s): GLUCOSE in the last 72 hours.    Coags    Lab Results   Component Value Date    PROTIME 12.6 10/17/2020    INR 1.09 10/17/2020    APTT 26.9      HCG (If Applicable) No results found for: PREGTESTUR, PREGSERUM, HCG, HCGQUANT   ABGs   Lab Results Component Value Date    PHART 7.327 10/17/2020    PO2ART 78.7 10/17/2020    XSW0KPZ 49.7 10/17/2020    VYA6HOL 26.0 10/17/2020    BEART 0 10/17/2020    E4ZHLBKC 94 10/17/2020      Type & Screen (If Applicable)  No results found for: LABABO, LABRH                         BMI: Wt Readings from Last 3 Encounters:       NPO Status:   Date of last liquid consumption: 02/07/22   Time of last liquid consumption: 0200   Date of last solid food consumption: 02/05/22      Time of last solid consumption: 1800       Anesthesia Evaluation  Patient summary reviewed and Nursing notes reviewed  Airway: Mallampati: III  TM distance: >3 FB   Neck ROM: full  Mouth opening: > = 3 FB Dental:          Pulmonary:   (+) COPD:      (-) asthma and shortness of breath                           Cardiovascular:    (+) hypertension:, CAD: no interval change, hyperlipidemia    (-) valvular problems/murmurs, past MI, CABG/stent, dysrhythmias and  angina                Neuro/Psych:   (+) psychiatric history:   (-) seizures, TIA and CVA           GI/Hepatic/Renal:        (-) GERD, PUD, liver disease and no renal disease       Endo/Other:    (+) Diabetes, : arthritis:., .                 Abdominal:             Vascular: negative vascular ROS.  + PVD, aortic or cerebral, . Other Findings:           Anesthesia Plan      MAC     ASA 3     (I discussed intravenous sedation to the patient's satisfaction including risks and alternatives. The patient agreed with the plan and has no further questions. Kizzy Vasquez MD )  Induction: intravenous. Anesthetic plan and risks discussed with patient. Plan discussed with CRNA. This pre-anesthesia assessment may be used as a history and physical.    DOS STAFF ADDENDUM:    Pt seen and examined, chart reviewed (including anesthesia, drug and allergy history). No interval changes to history and physical examination.   Anesthetic plan, risks, benefits, alternatives, and personnel involved discussed with patient. Patient verbalized an understanding and agrees to proceed.       Charlie Yin MD  February 7, 2022  7:22 AM

## 2022-02-07 NOTE — H&P
Pre-operative History and Physical    Patient: Charito Arciniega  : 1950  Acct#:     Intended Procedure:  Colonoscopy    HISTORY OF PRESENT ILLNESS:  The patient is a 66 y.o. female  who presents for/due to Constipation/Screening      Past Medical History:        Diagnosis Date    Arthritis     osteoarthritis    Back pain     CAD (coronary artery disease)     Cellulitis     [facility]) [facility]     pt unsure    Depression     Diabetes mellitus [facility]     Enlarging abdominal aortic aneurysm (AAA) ([facility]     History of femoral angiogram 2015    Hyperlipidemia     Hypertension     PVD (peripheral vascular disease) ([facility]      Past Surgical History:        Procedure Laterality Date    ABDOMINAL AORTIC ANEURYSM REPAIR N/A 10/16/2020    OPEN REPAIR ABDOMINAL AORTIC ANEURYSM performed by Jennifer Mccauley MD at [address] [place] [address] Right 16    I and D right gluteal abscess    CARDIAC CATHETERIZATION      CHOLECYSTECTOMY      HERNIA REPAIR      OTHER SURGICAL HISTORY Left 2015    excisional debridement 3.5 cm x 5 cm abscess    VASCULAR SURGERY Left     fem-tib     Medications Prior to Admission:   Prior to Admission medications    Medication Sig Start Date End Date Taking? Authorizing Provider   linaclotide (LINZESS) 145 MCG capsule Take 145 mcg by mouth every morning (before breakfast)    Historical Provider, MD   clopidogrel (PLAVIX) 75 MG tablet TAKE 1 TABLET BY MOUTH EVERY DAY  Patient taking differently: Dr. Renella Shone to stop five days prior 20   Ricky Bravo MD   gabapentin (NEURONTIN) 300 MG capsule Take 1 capsule by mouth 2 times daily for 180 days.  Intended supply: 30 days 20  Ricky Bravo MD   acetaminophen (TYLENOL) 500 MG tablet Take 500 mg by mouth every 6 hours as needed for Pain    Historical Provider, MD   metFORMIN (GLUCOPHAGE) 500 MG tablet Take 500 mg by mouth as needed Pt states she takes as needed/ she goes back and forth with taking one a day \"here and there\"    Historical Provider, MD   amLODIPine (NORVASC) 5 MG tablet Take 5 mg by mouth 2 times daily     Historical Provider, MD   pravastatin (PRAVACHOL) 40 MG tablet Take 40 mg by mouth daily. Historical Provider, MD       Allergies:  Ciprocinonide [fluocinolone]    Social History:   TOBACCO:   reports that she has been smoking cigarettes. She has a 22.50 pack-year smoking history. She has never used smokeless tobacco.  ETOH:   reports current alcohol use of about 3.8 standard drinks of alcohol per week. DRUGS:   reports no history of drug use. PHYSICAL EXAM:      Vital Signs: There were no vitals taken for this visit. Airway: No stridor or wheezing noted. Good air movement  Pulmonary: without wheezes. Clear to auscultation  Cardiac:regular rate and rhythm without loud murmurs  Abdomen:soft, nontender,  Bowel sounds present    Pre-Procedure Assessment / Plan:  1) Colonoscopy    ASA Grade:  ASA 3 - Patient with moderate systemic disease with functional limitations  Mallampati Classification:  Class III    Level of Sedation Plan:Deep sedation    Post Procedure plan: Return to same level of care    I assessed the patient and find that the patient is in satisfactory condition to proceed with the planned procedure and sedation plan. I have explained the risk, benefits, and alternatives to the procedure; the patient understands and agrees to proceed.        Areli Jo MD  2/7/2022

## 2022-02-07 NOTE — ANESTHESIA POSTPROCEDURE EVALUATION
Department of Anesthesiology  Postprocedure Note    Patient: Cal Phillips  MRN: [id number]  YOB: 1950  Date of evaluation: 2/7/2022  Time:  11:01 AM     Procedure Summary     Date: 02/07/22 Room / Location: [address] [place] [address]    Anesthesia Start: [address] Anesthesia Stop: 5641    Procedure: COLONOSCOPY POLYPECTOMY SNARE/COLD BIOPSY (N/A ) Diagnosis:       Constipation, unspecified constipation type      (CONSTIPATION)    Surgeons: Catarino Fontaine MD Responsible Provider: Marcia Munguia MD    Anesthesia Type: MAC ASA Status: 3          Anesthesia Type: MAC    Danelle Phase I: Danelle Score: 10    Danelle Phase II: Danelle Score: 10    Last vitals: Reviewed and per EMR flowsheets.        Anesthesia Post Evaluation    Patient location during evaluation: PACU  Level of consciousness: awake and alert  Airway patency: patent  Nausea & Vomiting: no nausea and no vomiting  Complications: no  Cardiovascular status: blood pressure returned to baseline  Respiratory status: acceptable  Hydration status: euvolemic  Comments: Postoperative Anesthesia Note    Name:    Cal Phillips  MRN:      [id number]    Patient Vitals in the past 12 hrs:  02/07/22 0949, BP:(!) 142/70, Pulse:84, Resp:12, SpO2:98 %  02/07/22 0919, BP:(!) 146/72, Temp:98.1 °F (36.7 °C), Temp src:Temporal, Pulse:88, Resp:12, SpO2:98 %  02/07/22 0905, BP:(!) 154/74, Temp:98 °F (36.7 °C), Temp src:Temporal, Pulse:89, Resp:14, SpO2:98 %  02/07/22 0900, BP:(!) 148/88, Pulse:100, Resp:20, SpO2:98 %  02/07/22 0855, BP:(!) 141/66, Pulse:107, Resp:18, SpO2:96 %  02/07/22 0850, BP:(!) 146/63, Pulse:108, Resp:18, SpO2:97 %  02/07/22 0848, BP:(!) 160/61, Temp:98 °F (36.7 °C), Temp src:Temporal, Pulse:116, Resp:16, SpO2:97 %  02/07/22 0702, BP:(!) 164/72, Temp:97.1 °F (36.2 °C), Temp src:Temporal, Pulse:85, Resp:16, SpO2:95 %, Height:5' 5\" (1.651 m), Weight:189 lb 6 oz (85.9 kg)     LABS:    CBC  Lab Results       Component Value               Date/Time                  WBC                      8.0                 12/03/2021 04:17 PM        HGB                      15.8                12/03/2021 04:17 PM        HCT                      49.0 (H)            12/03/2021 04:17 PM        PLT                      203                 12/03/2021 04:17 PM   RENAL  Lab Results       Component                Value               Date/Time                  NA                       131 (L)             12/03/2021 04:17 PM        K                        4.7                 12/03/2021 04:17 PM        CL                       97 (L)              12/03/2021 04:17 PM        CO2                      21                  12/03/2021 04:17 PM        BUN                      10                  12/03/2021 04:17 PM        CREATININE               1.0                 12/03/2021 04:17 PM        GLUCOSE                  96                  12/03/2021 04:17 PM   COAGS  Lab Results       Component                Value               Date/Time                  PROTIME                  12.6                10/17/2020 03:52 AM        INR                      1.09                10/17/2020 03:52 AM        APTT                     26.9                10/17/2020 03:52 AM     Intake & Output:  @04VTRV@    Nausea & Vomiting:  No    Level of Consciousness:  Awake    Pain Assessment:  Adequate analgesia    Anesthesia Complications:  No apparent anesthetic complications    SUMMARY      Vital signs stable  OK to discharge from Stage I post anesthesia care.   Care transferred from Anesthesiology department on discharge from perioperative area

## 2022-02-07 NOTE — PROGRESS NOTES
Pt awake & alert, VSS on RA, denies pain except for chronic lower back pain, no n/v. Transfer to Phase II.

## 2022-02-25 ENCOUNTER — TELEPHONE (OUTPATIENT)
Dept: CARDIOLOGY CLINIC | Age: 72
End: 2022-02-25

## 2022-02-25 ENCOUNTER — OFFICE VISIT (OUTPATIENT)
Dept: CARDIOLOGY CLINIC | Age: 72
End: 2022-02-25
Payer: MEDICARE

## 2022-02-25 VITALS
BODY MASS INDEX: 30.82 KG/M2 | HEART RATE: 96 BPM | DIASTOLIC BLOOD PRESSURE: 72 MMHG | OXYGEN SATURATION: 98 % | SYSTOLIC BLOOD PRESSURE: 134 MMHG | WEIGHT: 185 LBS | HEIGHT: 65 IN

## 2022-02-25 DIAGNOSIS — I73.9 PAD (PERIPHERAL ARTERY DISEASE) (HCC): ICD-10-CM

## 2022-02-25 DIAGNOSIS — I73.9 PAD (PERIPHERAL ARTERY DISEASE) (HCC): Primary | ICD-10-CM

## 2022-02-25 DIAGNOSIS — E78.2 MIXED HYPERLIPIDEMIA: ICD-10-CM

## 2022-02-25 DIAGNOSIS — I10 ESSENTIAL HYPERTENSION: Primary | ICD-10-CM

## 2022-02-25 DIAGNOSIS — I71.40 ABDOMINAL AORTIC ANEURYSM (AAA) WITHOUT RUPTURE: ICD-10-CM

## 2022-02-25 PROCEDURE — 93000 ELECTROCARDIOGRAM COMPLETE: CPT | Performed by: INTERNAL MEDICINE

## 2022-02-25 PROCEDURE — 99215 OFFICE O/P EST HI 40 MIN: CPT | Performed by: INTERNAL MEDICINE

## 2022-02-25 NOTE — PATIENT INSTRUCTIONS
Patient Education        Stopping Smoking: Care Instructions  Your Care Instructions     Cigarette smokers crave the nicotine in cigarettes. Giving it up is much harder than simply changing a habit. Your body has to stop craving the nicotine. It is hard to quit, but you can do it. There are many tools that people use to quit smoking. You may find that combining tools works best for you. There are several steps to quitting. First you get ready to quit. Then you get support to help you. After that, you learn new skills and behaviors to become a nonsmoker. For many people, a necessary step is getting and using medicine. Your doctor will help you set up the plan that best meets your needs. You may want to attend a smoking cessation program to help you quit smoking. When you choose a program, look for one that has proven success. Ask your doctor for ideas. You will greatly increase your chances of success if you take medicine as well as get counseling or join a cessation program.  Some of the changes you feel when you first quit tobacco are uncomfortable. Your body will miss the nicotine at first, and you may feel short-tempered and grumpy. You may have trouble sleeping or concentrating. Medicine can help you deal with these symptoms. You may struggle with changing your smoking habits and rituals. The last step is the tricky one: Be prepared for the smoking urge to continue for a time. This is a lot to deal with, but keep at it. You will feel better. Follow-up care is a key part of your treatment and safety. Be sure to make and go to all appointments, and call your doctor if you are having problems. It's also a good idea to know your test results and keep a list of the medicines you take. How can you care for yourself at home? · Ask your family, friends, and coworkers for support. You have a better chance of quitting if you have help and support.   · Join a support group, such as Nicotine Anonymous, for people who are trying to quit smoking. · Consider signing up for a smoking cessation program, such as the American Lung Association's Freedom from Smoking program.  · Get text messaging support. Go to the website at www.smokefree. gov to sign up for the [facility] program.  · Set a quit date. Pick your date carefully so that it is not right in the middle of a big deadline or stressful time. Once you quit, do not even take a puff. Get rid of all ashtrays and lighters after your last cigarette. Clean your house and your clothes so that they do not smell of smoke. · Learn how to be a nonsmoker. Think about ways you can avoid those things that make you reach for a cigarette. ? Avoid situations that put you at greatest risk for smoking. For some people, it is hard to have a drink with friends without smoking. For others, they might skip a coffee break with coworkers who smoke. ? Change your daily routine. Take a different route to work or eat a meal in a different place. · Cut down on stress. Calm yourself or release tension by doing an activity you enjoy, such as reading a book, taking a hot bath, or gardening. · Talk to your doctor or pharmacist about nicotine replacement therapy, which replaces the nicotine in your body. You still get nicotine but you do not use tobacco. Nicotine replacement products help you slowly reduce the amount of nicotine you need. These products come in several forms, many of them available over-the-counter:  ? Nicotine patches  ? Nicotine gum and lozenges  ? Nicotine inhaler  · Ask your doctor about bupropion (Wellbutrin) or varenicline (Chantix), which are prescription medicines. They do not contain nicotine. They help you by reducing withdrawal symptoms, such as stress and anxiety. · Some people find hypnosis, acupuncture, and massage helpful for ending the smoking habit. · Eat a healthy diet and get regular exercise.  Having healthy habits will help your body move past its craving for nicotine. · Be prepared to keep trying. Most people are not successful the first few times they try to quit. Do not get mad at yourself if you smoke again. Make a list of things you learned and think about when you want to try again, such as next week, next month, or next year. Where can you learn more? Go to https://[employer]icewvelia.[employer]. org and sign in to your [employer] account. Enter U904 in the [employer] box to learn more about \"Stopping Smoking: Care Instructions. \"     If you do not have an account, please click on the \"Sign Up Now\" link. Current as of: February 11, 2021               Content Version: 13.1  © 2006-2021 Healthwise[employer] Incorporated. Care instructions adapted under license by [facility] ([facility]). If you have questions about a medical condition or this instruction, always ask your healthcare professional. [address] any warranty or liability for your use of this information.

## 2022-02-25 NOTE — PROGRESS NOTES
[facility]  Cardiology Progress Note    Christin Gr  1950 February 25, 2022      Reason for Referral: PAD     CC: \"I have pain sometimes. \"     Subjective:     History of Present Illness:    Christin Gr is a 66 y.o. patient with a PMH significant for PAD with known right SFA occlusion hyperlipidemia, hypertension and type 2 diabetes who presents today for management of PAD. She was admitted to [facility] with worsening claudication and underwent a peripheral angiogram. She had EKOS and thrombectomy of left femoropopliteal graft. Today, she is here for follow up. She says that she her symptoms are different daily. At times she has a lot of pain and other days she has no pain. Patient denies exertional chest pain/pressure, dyspnea at rest, PIMENTEL, PND, orthopnea, palpitations, lightheadedness, weight changes, changes in LE edema, and syncope. Past Medical History:   has a past medical history of Arthritis, Back pain, CAD (coronary artery disease), Cellulitis, COPD (chronic obstructive pulmonary disease) ([address] Depression, Diabetes mellitus ([address] Enlarging abdominal aortic aneurysm (AAA) ([address] History of femoral angiogram, Hyperlipidemia, Hypertension, and PVD (peripheral vascular disease) ([address] Surgical History:   has a past surgical history that includes Cholecystectomy; hernia repair; other surgical history (Left, 9-); vascular surgery (Left); Abscess Drainage (Right, 2/19/16); Cardiac catheterization; Abdominal aortic aneurysm repair (N/A, 10/16/2020); and Colonoscopy (N/A, 2/7/2022). Social History:   reports that she has been smoking cigarettes. She has a 22.50 pack-year smoking history. She has never used smokeless tobacco. She reports current alcohol use of about 3.8 standard drinks of alcohol per week. She reports that she does not use drugs. Family History:  family history includes Heart Surgery in her father.     Home Medications:  Were reviewed and are listed in nursing record and/or below  Prior to Admission medications    Medication Sig Start Date End Date Taking? Authorizing Provider   clopidogrel (PLAVIX) 75 MG tablet TAKE 1 TABLET BY MOUTH EVERY DAY  Patient taking differently: Dr. Sheba Kilgore to stop five days prior 8/31/20  Yes Blanco Jerry MD   acetaminophen (TYLENOL) 500 MG tablet Take 500 mg by mouth every 6 hours as needed for Pain   Yes Historical Provider, MD   metFORMIN (GLUCOPHAGE) 500 MG tablet Take 500 mg by mouth as needed Pt states she takes as needed/ she goes back and forth with taking one a day \"here and there\"   Yes Historical Provider, MD   amLODIPine (NORVASC) 5 MG tablet Take 5 mg by mouth 2 times daily    Yes Historical Provider, MD   pravastatin (PRAVACHOL) 40 MG tablet Take 40 mg by mouth daily. Yes Historical Provider, MD   gabapentin (NEURONTIN) 300 MG capsule Take 1 capsule by mouth 2 times daily for 180 days. Intended supply: 30 days 8/31/20 1/25/22  Blanco Jerry MD      Allergies:  Ciprocinonide [fluocinolone]     Review of Systems: all reviewed and refer to HPI   · Constitutional: no unanticipated weight loss. There's been no change in energy level, sleep pattern, or activity level. No fevers, chills. · Eyes: No visual changes or diplopia. No scleral icterus. · ENT: No Headaches, hearing loss or vertigo. No mouth sores or sore throat. · Cardiovascular: No Chest pain, tightness or discomfort.  No Shortness of breath. No Dyspnea on exertion, Orthopnea, Paroxysmal nocturnal dyspnea or breathlessness at rest.   No Palpitations.  No Syncope ('blackouts', 'faints', 'collapse') or dizziness. · Respiratory: No cough or wheezing, no sputum production. No hematemesis. · Gastrointestinal: No abdominal pain, appetite loss, blood in stools. No change in bowel or bladder habits. · Genitourinary: No dysuria, trouble voiding, or hematuria. · Musculoskeletal:  No gait disturbance, no joint complaints.   · Integumentary: No rash or pruritis. · Neurological: No headache, diplopia, change in muscle strength, numbness or tingling. · Psychiatric: No anxiety or depression. · Endocrine: No temperature intolerance. No excessive thirst, fluid intake, or urination. No tremor. · Hematologic/Lymphatic: No abnormal bruising or bleeding, blood clots or swollen lymph nodes. · Allergic/Immunologic: No nasal congestion or hives. · Vascular: BLE edema and pain       Objective:     PHYSICAL EXAM:      Vitals:    02/25/22 1310   BP: 134/72   Pulse: 96   SpO2: 98%   Weight: 185 lb (83.9 kg)   Height: 5' 5\" (1.651 m)      Weight: 185 lb (83.9 kg)       General Appearance:  Alert, cooperative, no distress, appears stated age. Head:  Normocephalic, without obvious abnormality, atraumatic. Eyes:  Pupils equal and round. No scleral icterus. Mouth: Moist mucosa, no pharyngeal erythema. Nose: Nares normal. No drainage or sinus tenderness. Neck: Supple, symmetrical, trachea midline. No adenopathy. No tenderness/mass/nodules. No carotid bruit or elevated JVD. Lungs:   Respiratory Effort: Normal   Auscultation: Clear to auscultation bilaterally, respirations unlabored. No wheeze, rales   Chest Wall:  No tenderness or deformity. Cardiovascular:    Pulses  Palpation: normal   Ascultation: Regular rate, S1/ S2 normal. No murmur, rub, or gallop. 2+ radial and pedal pulses, symmetric  Carotid  Femoral  + doppler PT, DP on right but - none on left   Abdomen and Gastrointestinal:   Soft, non-tender, bowel sounds active. Liver and Spleen  Masses   Musculoskeletal: No muscle wasting  Back  Gait   Extremities: Extremities normal, atraumatic. No cyanosis or edema. No cyanosis clubbing       Skin: Inspection and palpation performed, no rashes or lesions. Pysch: Normal mood and affect.  Alert and oriented to time place person   Neurologic: Normal gross motor and sensory exam.       Labs     All labs have been reviewed    Lab Results   Component Value Date    WBC 8.0 12/03/2021    RBC 5.26 12/03/2021    HGB 15.8 12/03/2021    HCT 49.0 12/03/2021    MCV 93.1 12/03/2021    RDW 14.6 12/03/2021     12/03/2021     Lab Results   Component Value Date     12/03/2021    K 4.7 12/03/2021    CL 97 12/03/2021    CO2 21 12/03/2021    BUN 10 12/03/2021    CREATININE 1.0 12/03/2021    GFRAA >60 12/03/2021    GFRAA >60 03/15/2011    AGRATIO 1.2 12/03/2021    LABGLOM 54 12/03/2021    GLUCOSE 96 12/03/2021    PROT 7.0 12/03/2021    PROT 6.5 03/15/2011    CALCIUM 9.3 12/03/2021    BILITOT 0.3 12/03/2021    ALKPHOS 96 12/03/2021    AST 26 12/03/2021    ALT 16 12/03/2021     No results found for: PTINR  No results found for: LABA1C  Lab Results   Component Value Date    TROPONINI <0.01 04/15/2014       Cardiac, Vascular and Imaging Data all Personally Reviewed in Detail by Myself      EKG Interpretation: 2/25/2022 Sinus rhythm     Stress Test: 5/1/14 ([facility])   The LV EF is 81%  All segments are normal. There is a small sized, partially reversible defect in the apical wall. This defect is consistent with breast attenuation. 1.Non-diagnostic ECG for ischemia with pharmocologic stress. 2.Normal left ventriular systolic function. 3.Negative nuclear stress imaging for inducible ischemia. 4.Nuclear stress image findings indicate low risk for future ischemic event . Stress test 9/15/2020   Pharmacological Stress/MPI Results:        1. Technically a satisfactory study.    2. Normal pharmacological stress portion of the study.    3. No evidence of Ischemia by Myocardial Perfusion Imaging.    4. Gated Study shows normal LV size and Systolic function; EF is 70 %. ECHO 8/24/2021  Suboptimal image quality. Normal left ventricle size, wall thickness, and systolic function with an  estimated ejection fraction of 60-65%. No regional wall motion abnormalities  are seen. Normal right ventricular size and function. Trivial mitral regurgitation.   The aortic valve leaflets are not well visualized but appear thickened. No  stenosis based on doppler velocity/gradient. Peripheral angiogram 12/30/14 ([facility])    Renal arteries are widely patent. Infrarenal aorta is ectatic. May have a small aneurysm.  The  common iliac, external and internal iliac arteries are patent. There is some mild disease but no high-grade stenosis noted. Right common femoral, profunda femoral, superficial  Femoral\arteries are patent. Distal SFA is occluded. Reconstitution of the popliteal and the  patient has two-vessel runoff on the right. Left common femoral and profunda femoral arteries  patent.  The patient has a fem below-the-knee pop bypass which appears widely patent.  Multiple views were obtained at both the proximal and distal anastomosis and I am unable to see any significant stenosis. The flow does appear to be slightly sluggish  compared to the right side, however, I do not see any evidence of stenosis. The patient has two vessel runoff to the left lower extremity. Peripheral angiogram 4/28/15 (Dr. Esperanza Barney)   Irregular right superficial femoral artery through the majority of its course without hemodynamically significant stenoses. The right superficial femoral artery and proximal above knee popliteal artery were heavily diseased and occluded for a distance of 3 cm at the adductor canal.  The right popliteal artery reconstituted at that level with widely patent below knee  runoff through the posterior tibial and peroneal arteries. Unsuccessful attempt at recanalization and angioplasty of the right  superficial femoral/popliteal artery occlusion. Peripheral angiogram 10/21/19  Abdominal aorta has small aneurysm present. It is tortuous. Patent mesenteric and renals. Left and right common iliac arteries and external iliac arteries are patent. Left common femoral artery and profunda are patent. Left superficial femoral artery is occluded. Left femoropopliteal graft is occluded.   The patient has two-vessel run off below the knee. Placement of EKOS tPA catheter after performing mechanical thrombectomy of the left femoropopliteal graft. LE arterial duplex 11/1/19  There is an KIYA of 0.7 on the right.    Elevated velocity of the proximal common femoral and mid femoral arteries    suggests a less than 50% stenosis.    Elevated velocity of the the proximal popliteal artery suggests a 50-70% stenosis     There is an KIYA of 0.9 on the left.    The left femoral to popliteal arterial bypass graft appears patent with    normal waveforms throughout.    The majority of the waveforms are biphasic throughout the left lower extremity      CTA abd aorta w bilat. Runoff:11/12/19  1. Right lower extremity: Findings most compatible with chronic occlusion of   the above the knee popliteal artery with distal reconstitution.  There is   three-vessel runoff to the level of the ankle.  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   2. Left lower extremity: Patent femoral to popliteal bypass graft with   findings most compatible with chronic occlusion of the superficial femoral   artery. Terrance Letters is three-vessel runoff to the level of the ankle. 3. Thin fluid collection within the subcutaneous fat of the right groin with   a suggestion of a defined wall, the possibility of an abscess cannot be   entirely excluded, correlate with visual inspection.  This measures at least   6 cm in the transverse dimension but less than 2 cm in thickness.  Please see   body of report for details. 4. Aneurysmal dilatation of the abdominal aorta measuring up to 5 cm in   greatest AP dimension as described in body of report. 5. Indeterminate 1.6 cm left adrenal nodule, this can be further evaluated   via dedicated outpatient adrenal protocol MRI.      CT abdomen and pelvis 6/23/2020  Cellulitis of the left inguinal tissues but no abscess.       5.6 cm saccular abdominal aortic aneurysm. Recommend referral to a vascular   specialist.     BLE arterial Doppler 8/24/2021  Right Impression   Right KIYA 0.81. This is consistent with mild PAD at rest.   Elevated velocity of the mid superficial femoral suggests a less than 50%   stenosis. Possible occlusion of the right peroneal artery. Left Impression   Left KIYA 0.58. This is consistent with moderate PAD at rest.   Possible occlusion of the proximal popliteal artery. Occlusion of the left femoral- popliteal arterial bypass graft. Elevated velocity of the proximal superficial femoral artery suggests a less   than 50% stenosis. Assessment and Plan     Peripheral Arterial Disease and Claudication  Occlusion of the femoropopliteal graft with popliteal artery occlusion. Intermittent pain. Continue Asa, and Plavix. Discontinue Eliquis. I have discussed with her options of peripheral angiogram or medical management. She is agreeable to proceed with peripheral angiogram. SEncouraged regular walking program.     I had a detail discussion with the patient and the family members regarding the risk and benefit of the procedures. I explained to them the details of the procedure. I explained to them that the procedure will be performed using moderate sedation and local anaesthesia using lidocaine. I have presented reasonable alternatives to the patient's proposed care, treatment, and services. The discussion I have done encompassed risks, benefits, and side effects related to the alternatives and the risks related to not receiving the proposed care, treatment, and services. The patient and the family members understood the risk and benefits and the details of procedure and would like to go ahead with the procedure. The patient gave informed consent. Abdominal aortic aneurysm   S/p open repair 10/16/2020. Managed by Dr Marycarmen Connell. Hyperlipidemia, mixed   Continue Pravachol 40 mg daily. Essential Hypertension  Controlled.  Continue current medical management. Murmur  Asymptomatic. Nicotine Addiction. Occasionally smokes. I have spent 3-5 minutes on smoking cessation. Diabetes   Managed per PCP    Follow up after procedure. Thank you for allowing us to participate in the care of Javad Carballo. Please do not hesitate to contact me if you have any questions.     Orly Andres MD, MPH    [facility], [address] [place] [address]  Ph: [phone]  Fax: [phone]        This note was scribed in the presence of Dr Megan Mosquera, by Gertrudis Ramsey RN

## 2022-02-25 NOTE — TELEPHONE ENCOUNTER
Patient seen in the office on 2/25/2022 and needs to be scheduled for peripheral angiogram. Will call to schedule.

## 2022-02-25 NOTE — LETTER
Chapin Haywood  1950    Peripheral angiogram scheduled for 4/4/2022 at 8:30  Arrive at 7:00      The morning of your procedure you will park in the hospital parking lot and report directly to the cath lab to check in.     Pre-Procedure Instructions   1. You will need to fast for at least 8 hours prior to procedure. No caffeine the morning of.   2. Hold all diabetic medications including, Metfomin. If you take Lantus/Levemir only take ½ your normal dose the evening before. All other medications can be taken in the morning with sips of water. 3. You will need to take 325 mg aspirin the morning of. If you are currently taking 81 mg please take 4 tablets that morning. 4. Do not use any lotions, creams or perfume the morning of procedure. 5. Pre-procedure lab work will need to be completed 5-7 days prior to procedure. 6. Please have a responsible adult to drive you home after procedure. We advise you have someone to stay with you for 24 hours following procedure for precautionary measures. Depending on procedure you may require an overnight stay. 7. Cath lab will provide you with all post procedure instructions. If you have any questions regarding the procedure itself or medications, please call [phone] and ask to speak with a nurse.

## 2022-02-28 NOTE — TELEPHONE ENCOUNTER
Peripheral angiogram scheduled for 4/4/2022 at 8:30  Arrive at 7:00      The morning of your procedure you will park in the hospital parking lot and report directly to the cath lab to check in.     Pre-Procedure Instructions   1. You will need to fast for at least 8 hours prior to procedure. No caffeine the morning of.   2. Hold all diabetic medications including, Metfomin. If you take Lantus/Levemir only take ½ your normal dose the evening before. All other medications can be taken in the morning with sips of water. 3. You will need to take 325 mg aspirin the morning of. If you are currently taking 81 mg please take 4 tablets that morning. 4. Do not use any lotions, creams or perfume the morning of procedure. 5. Pre-procedure lab work will need to be completed 5-7 days prior to procedure. 6. Please have a responsible adult to drive you home after procedure. We advise you have someone to stay with you for 24 hours following procedure for precautionary measures. Depending on procedure you may require an overnight stay. 7. Cath lab will provide you with all post procedure instructions. If you have any questions regarding the procedure itself or medications, please call [phone] and ask to speak with a nurse. Published on [employer] and e-mail to Chikis Ware.

## 2022-03-01 NOTE — TELEPHONE ENCOUNTER
Patient returned call and is agreeable to date and time. Reviewed instructions and she also ask that I mail her the instructions. Will place in the mail today.

## 2022-03-28 DIAGNOSIS — I10 ESSENTIAL HYPERTENSION: ICD-10-CM

## 2022-03-28 DIAGNOSIS — I73.9 PAD (PERIPHERAL ARTERY DISEASE) (HCC): ICD-10-CM

## 2022-03-28 LAB
ANION GAP SERPL CALCULATED.3IONS-SCNC: 11 MMOL/L (ref 3–16)
BUN BLDV-MCNC: 11 MG/DL (ref 7–20)
CALCIUM SERPL-MCNC: 9.7 MG/DL (ref 8.3–10.6)
CHLORIDE BLD-SCNC: 100 MMOL/L (ref 99–110)
CO2: 28 MMOL/L (ref 21–32)
CREAT SERPL-MCNC: 1.1 MG/DL (ref 0.6–1.2)
GFR AFRICAN AMERICAN: 59
GFR NON-AFRICAN AMERICAN: 49
GLUCOSE BLD-MCNC: 107 MG/DL (ref 70–99)
HCT VFR BLD CALC: 46.2 % (ref 36–48)
HEMOGLOBIN: 15.4 G/DL (ref 12–16)
MCH RBC QN AUTO: 30 PG (ref 26–34)
MCHC RBC AUTO-ENTMCNC: 33.2 G/DL (ref 31–36)
MCV RBC AUTO: 90.2 FL (ref 80–100)
PDW BLD-RTO: 14.3 % (ref 12.4–15.4)
PLATELET # BLD: 256 K/UL (ref 135–450)
PMV BLD AUTO: 8 FL (ref 5–10.5)
POTASSIUM SERPL-SCNC: 4.3 MMOL/L (ref 3.5–5.1)
RBC # BLD: 5.12 M/UL (ref 4–5.2)
SODIUM BLD-SCNC: 139 MMOL/L (ref 136–145)
WBC # BLD: 7.6 K/UL (ref 4–11)

## 2022-03-30 ENCOUNTER — TELEPHONE (OUTPATIENT)
Dept: CARDIOLOGY CLINIC | Age: 72
End: 2022-03-30

## 2022-03-30 NOTE — TELEPHONE ENCOUNTER
This has already been addressed by Zita Ward who completes our procedure authorizations. Please call to discuss.

## 2022-03-30 NOTE — TELEPHONE ENCOUNTER
Called patient to let her know of message below. She v/u and states Carmelita Barr was going to give her a call later today to let her know if she has been approved.

## 2022-03-30 NOTE — TELEPHONE ENCOUNTER
Patient called in stating that she needs a new pre authorization sent in to her new [employer]. Patient is having a PERIPHERAL ANGIOGRAPHY Fortunato Dumont done on Monday April 4th. Patient can be reached at [phone].

## 2022-03-31 NOTE — TELEPHONE ENCOUNTER
Called and spoke to Loree Motley and she states that she is having increased pain. She states that her leg is warm and has no discoloration. I let her know that she could come into the office and someone could take a look at her leg. She says that the pain started to increase about 2 days ago. She is also worried about her insurance changing and I let her know that I have already talked to Adolfo Augustine and the insurance change will be okay. Let her know if symptoms worsened she should go to the emergency room. Advised her to call with any increased symptoms.

## 2022-03-31 NOTE — TELEPHONE ENCOUNTER
Patient returned call and says that she took a pain pill that she had at home with little relief. She was asking if she could get more pain pills to make it to Monday. I let her know that Dr Fernanda Sparks does not usually prescribe pain medications. She is agreeable to come in tomorrow for a nurse visit.  I have her scheduled for 2:00

## 2022-04-01 ENCOUNTER — NURSE ONLY (OUTPATIENT)
Dept: CARDIOLOGY CLINIC | Age: 72
End: 2022-04-01

## 2022-04-01 DIAGNOSIS — M79.605 PAIN OF LEFT LOWER EXTREMITY: Primary | ICD-10-CM

## 2022-04-01 RX ORDER — TRAMADOL HYDROCHLORIDE 50 MG/1
50 TABLET ORAL EVERY 8 HOURS PRN
Qty: 15 TABLET | Refills: 0
Start: 2022-04-01 | End: 2022-04-06

## 2022-04-01 NOTE — TELEPHONE ENCOUNTER
Patient came into the office and she is having increased upper left leg pain. She is scheduled for a peripheral angiogram on Monday. She is asking if there is something that she can have to help with the pain.  Please advise

## 2022-04-01 NOTE — TELEPHONE ENCOUNTER
Skinny Nelson spoke to [facility] number [id number]. NPR for Beverley's procedure on Monday 4/4. Called to rely the information to Carlito Haynes and she asked to have Hoda Sarmiento RN give her a call back, she is in so much pain, not sure if she can make in today. She asked just for some pain pills just to get her by till Monday. Told her I would put a message into Hoda Sarmiento [facility].

## 2022-04-01 NOTE — TELEPHONE ENCOUNTER
Discussed with Dr Angelo Ponce and will give her Ultram 50 mg by mouth every 8 hours as needed for pain. Called Rx to pharmacy and made patient aware.

## 2022-04-01 NOTE — PROGRESS NOTES
Patient in the office and states that she has had increased pain in her left upper leg since Monday. She has not been able to do much and is scheduled for a peripheral angiogram on 4/4/2022. I let her know that I would have to discuss with Dr Lily Foster for something for pain. If he is agreeable will call into pharmacy.

## 2022-04-04 ENCOUNTER — HOSPITAL ENCOUNTER (OUTPATIENT)
Dept: CARDIAC CATH/INVASIVE PROCEDURES | Age: 72
Discharge: HOME | End: 2022-04-04
Payer: MEDICARE

## 2022-04-04 ENCOUNTER — TELEPHONE (OUTPATIENT)
Dept: CARDIOLOGY CLINIC | Age: 72
End: 2022-04-04

## 2022-04-04 VITALS
BODY MASS INDEX: 30.82 KG/M2 | RESPIRATION RATE: 18 BRPM | DIASTOLIC BLOOD PRESSURE: 87 MMHG | HEIGHT: 65 IN | OXYGEN SATURATION: 95 % | WEIGHT: 185 LBS | SYSTOLIC BLOOD PRESSURE: 180 MMHG | HEART RATE: 74 BPM | TEMPERATURE: 97 F

## 2022-04-04 DIAGNOSIS — I73.9 CLAUDICATION (HCC): ICD-10-CM

## 2022-04-04 DIAGNOSIS — I70.229 CRITICAL LOWER LIMB ISCHEMIA (HCC): ICD-10-CM

## 2022-04-04 DIAGNOSIS — I73.9 PAD (PERIPHERAL ARTERY DISEASE) (HCC): Primary | ICD-10-CM

## 2022-04-04 LAB
POC ACT LR: 331 SEC
POC ACT LR: 339 SEC

## 2022-04-04 PROCEDURE — C2623 CATH, TRANSLUMIN, DRUG-COAT: HCPCS

## 2022-04-04 PROCEDURE — 2500000003 HC RX 250 WO HCPCS

## 2022-04-04 PROCEDURE — C1894 INTRO/SHEATH, NON-LASER: HCPCS

## 2022-04-04 PROCEDURE — 99152 MOD SED SAME PHYS/QHP 5/>YRS: CPT

## 2022-04-04 PROCEDURE — 2709999900 HC NON-CHARGEABLE SUPPLY

## 2022-04-04 PROCEDURE — C1887 CATHETER, GUIDING: HCPCS

## 2022-04-04 PROCEDURE — 6360000004 HC RX CONTRAST MEDICATION: Performed by: INTERNAL MEDICINE

## 2022-04-04 PROCEDURE — 75716 ARTERY X-RAYS ARMS/LEGS: CPT

## 2022-04-04 PROCEDURE — C1725 CATH, TRANSLUMIN NON-LASER: HCPCS

## 2022-04-04 PROCEDURE — 85347 COAGULATION TIME ACTIVATED: CPT

## 2022-04-04 PROCEDURE — C1769 GUIDE WIRE: HCPCS

## 2022-04-04 PROCEDURE — 37224 PR REVSC OPN/PRG FEM/POP W/ANGIOPLASTY UNI: CPT | Performed by: INTERNAL MEDICINE

## 2022-04-04 PROCEDURE — 37224 HC FEM POP TERRITORY PLASTY: CPT

## 2022-04-04 PROCEDURE — 6370000000 HC RX 637 (ALT 250 FOR IP)

## 2022-04-04 PROCEDURE — 75625 CONTRAST EXAM ABDOMINL AORTA: CPT

## 2022-04-04 PROCEDURE — 2580000003 HC RX 258

## 2022-04-04 PROCEDURE — 75710 ARTERY X-RAYS ARM/LEG: CPT | Performed by: INTERNAL MEDICINE

## 2022-04-04 PROCEDURE — 99153 MOD SED SAME PHYS/QHP EA: CPT

## 2022-04-04 PROCEDURE — 6360000002 HC RX W HCPCS

## 2022-04-04 RX ORDER — SODIUM CHLORIDE 9 MG/ML
INJECTION, SOLUTION INTRAVENOUS CONTINUOUS
Status: DISCONTINUED | OUTPATIENT
Start: 2022-04-04 | End: 2022-04-05 | Stop reason: HOSPADM

## 2022-04-04 RX ORDER — SODIUM CHLORIDE 9 MG/ML
25 INJECTION, SOLUTION INTRAVENOUS PRN
Status: DISCONTINUED | OUTPATIENT
Start: 2022-04-04 | End: 2022-04-05 | Stop reason: HOSPADM

## 2022-04-04 RX ORDER — IODIXANOL 320 MG/ML
210 INJECTION, SOLUTION INTRAVASCULAR
Status: COMPLETED | OUTPATIENT
Start: 2022-04-04 | End: 2022-04-04

## 2022-04-04 RX ORDER — SODIUM CHLORIDE 0.9 % (FLUSH) 0.9 %
5-40 SYRINGE (ML) INJECTION PRN
Status: DISCONTINUED | OUTPATIENT
Start: 2022-04-04 | End: 2022-04-05 | Stop reason: HOSPADM

## 2022-04-04 RX ORDER — ONDANSETRON 2 MG/ML
4 INJECTION INTRAMUSCULAR; INTRAVENOUS EVERY 6 HOURS PRN
Status: DISCONTINUED | OUTPATIENT
Start: 2022-04-04 | End: 2022-04-05 | Stop reason: HOSPADM

## 2022-04-04 RX ORDER — RIVAROXABAN 2.5 MG/1
2.5 TABLET, FILM COATED ORAL 2 TIMES DAILY
Qty: 60 TABLET | Refills: 3 | Status: SHIPPED | OUTPATIENT
Start: 2022-04-04 | End: 2022-07-25

## 2022-04-04 RX ORDER — SODIUM CHLORIDE 9 MG/ML
INJECTION, SOLUTION INTRAVENOUS CONTINUOUS
Status: DISCONTINUED | OUTPATIENT
Start: 2022-04-04 | End: 2022-04-04 | Stop reason: SDUPTHER

## 2022-04-04 RX ORDER — ACETAMINOPHEN 325 MG/1
650 TABLET ORAL EVERY 4 HOURS PRN
Status: DISCONTINUED | OUTPATIENT
Start: 2022-04-04 | End: 2022-04-05 | Stop reason: HOSPADM

## 2022-04-04 RX ORDER — SODIUM CHLORIDE 9 MG/ML
25 INJECTION, SOLUTION INTRAVENOUS PRN
Status: DISCONTINUED | OUTPATIENT
Start: 2022-04-04 | End: 2022-04-04 | Stop reason: SDUPTHER

## 2022-04-04 RX ORDER — SODIUM CHLORIDE 0.9 % (FLUSH) 0.9 %
5-40 SYRINGE (ML) INJECTION EVERY 12 HOURS SCHEDULED
Status: DISCONTINUED | OUTPATIENT
Start: 2022-04-04 | End: 2022-04-05 | Stop reason: HOSPADM

## 2022-04-04 RX ORDER — OXYCODONE AND ACETAMINOPHEN 7.5; 325 MG/1; MG/1
1 TABLET ORAL EVERY 8 HOURS PRN
Qty: 10 TABLET | Refills: 0 | Status: SHIPPED | OUTPATIENT
Start: 2022-04-04 | End: 2022-04-07

## 2022-04-04 RX ORDER — SODIUM CHLORIDE 0.9 % (FLUSH) 0.9 %
5-40 SYRINGE (ML) INJECTION EVERY 12 HOURS SCHEDULED
Status: DISCONTINUED | OUTPATIENT
Start: 2022-04-04 | End: 2022-04-04 | Stop reason: SDUPTHER

## 2022-04-04 RX ORDER — SODIUM CHLORIDE 0.9 % (FLUSH) 0.9 %
5-40 SYRINGE (ML) INJECTION PRN
Status: DISCONTINUED | OUTPATIENT
Start: 2022-04-04 | End: 2022-04-04 | Stop reason: SDUPTHER

## 2022-04-04 RX ADMIN — IODIXANOL 210 ML: 320 INJECTION, SOLUTION INTRAVASCULAR at 09:53

## 2022-04-04 ASSESSMENT — PAIN SCALES - GENERAL: PAINLEVEL_OUTOF10: 10

## 2022-04-04 ASSESSMENT — PAIN DESCRIPTION - ONSET: ONSET: ON-GOING

## 2022-04-04 ASSESSMENT — PAIN DESCRIPTION - PAIN TYPE: TYPE: CHRONIC PAIN

## 2022-04-04 ASSESSMENT — PAIN DESCRIPTION - ORIENTATION: ORIENTATION: LEFT

## 2022-04-04 ASSESSMENT — PAIN DESCRIPTION - DESCRIPTORS: DESCRIPTORS: ACHING;DULL

## 2022-04-04 ASSESSMENT — PAIN - FUNCTIONAL ASSESSMENT: PAIN_FUNCTIONAL_ASSESSMENT: PREVENTS OR INTERFERES SOME ACTIVE ACTIVITIES AND ADLS

## 2022-04-04 ASSESSMENT — PAIN DESCRIPTION - PROGRESSION: CLINICAL_PROGRESSION: GRADUALLY WORSENING

## 2022-04-04 ASSESSMENT — PAIN DESCRIPTION - LOCATION: LOCATION: LEG

## 2022-04-04 ASSESSMENT — PAIN DESCRIPTION - FREQUENCY: FREQUENCY: CONTINUOUS

## 2022-04-04 NOTE — PROCEDURES
[address] [address]                            CARDIAC CATHETERIZATION    PATIENT NAME: Denys Silva                   :        1950  MED REC NO:   [id number]                          ROOM:  ACCOUNT NO:   [de-identified]                           ADMIT DATE: 2022  PROVIDER:     Michele Mitchell MD    DATE OF PROCEDURE:  2022    PROCEDURE PERFORMED:  1. Left lower extremity selective angiography, abdominal aortogram.  2.  Balloon angioplasty of the left superficial femoral artery. INDICATION FOR PROCEDURE:  Peripheral artery disease and severe  claudication. Informed consent obtained. ASA is II. Mallampati score II. PROCEDURE IN DETAIL:  The patient was brought to cardiac cath  laboratory. Right groin was prepped and draped in sterile fashion. We  accessed the right common femoral artery. We inserted pigtail and  performed abdominal aortogram.  COLIN was inserted in the left common  iliac artery. We performed left lower extremity selective angiography. Then, a 6-Moldovan crossover sheath was advanced and parked in the distal  left external iliac artery. Wire was advanced in the occluded segment  of the left SFA. Balloon angioplasty was performed at the superficial  femoral artery using a 3 then 5 then a 6-mm drug-coated balloon. After  performing balloon angioplasty with a 6-mm drug-coated balloon, final  angiography was performed which demonstrated flow into the foot and SFA  was wide open and patent. All catheters and wires were removed. Sheaths were removed. Manual pressure was applied to achieve  hemostasis. No complication. Estimated blood loss less than 30 mL. ANGIOGRAPHY FINDINGS:  1. Abdominal aorta is patent. 2.  Left common iliac artery 50% stenosis. Right common iliac artery  50% stenosis. 3.  Left superficial femoral artery completely occluded.   Profunda  femoris and external iliac arteries are patent. 4.  Left anterior tibial artery and posterior tibial artery are patent. SUMMARY:  Successful revascularization of left superficial femoral  artery. Balloon angioplasty alone with a 6-mm drug-coated balloon. RECOMMENDATION:  1. Continue postop post cath care. 2.  Site care. 3.  Risk factor modification. 4.  Plavix daily. Xarelto 2.5 mg b.i.d.  5.  Follow up with me in the office.         Carol Saavedra MD    D: 04/04/2022 10:08:35       T: 04/04/2022 13:09:53     [id number]  Job#: [id number]     Doc#: [id number]    CC:

## 2022-04-04 NOTE — TELEPHONE ENCOUNTER
Trevon from [facility] on [address] called [phone] and states patient was prescribed oxyCODONE- acetaminophen today but recently  TraMADol 3 days ago for a 5 day supply. Please advise if the percocet is ok to fill.

## 2022-04-04 NOTE — H&P
Reason for Referral: PAD      CC: \"I have pain sometimes. \"      Subjective:      History of Present Illness:     Marcella Soriano is a 66 y.o. patient with a PMH significant for PAD with known right SFA occlusion hyperlipidemia, hypertension and type 2 diabetes who presents today for management of PAD. She was admitted to [facility] with worsening claudication and underwent a peripheral angiogram. She had EKOS and thrombectomy of left femoropopliteal graft.     Today, she is here for follow up. She says that she her symptoms are different daily. At times she has a lot of pain and other days she has no pain. Patient denies exertional chest pain/pressure, dyspnea at rest, PIMENTEL, PND, orthopnea, palpitations, lightheadedness, weight changes, changes in LE edema, and syncope.        Past Medical History:   has a past medical history of Arthritis, Back pain, CAD (coronary artery disease), Cellulitis, COPD (chronic obstructive pulmonary disease) ([address] Depression, Diabetes mellitus ([facility] Enlarging abdominal aortic aneurysm (AAA) ([address] History of femoral angiogram, Hyperlipidemia, Hypertension, and PVD (peripheral vascular disease) ([address]    Surgical History:   has a past surgical history that includes Cholecystectomy; hernia repair; other surgical history (Left, 9-); vascular surgery (Left); Abscess Drainage (Right, 2/19/16); Cardiac catheterization; Abdominal aortic aneurysm repair (N/A, 10/16/2020); and Colonoscopy (N/A, 2/7/2022).      Social History:   reports that she has been smoking cigarettes. She has a 22.50 pack-year smoking history. She has never used smokeless tobacco. She reports current alcohol use of about 3.8 standard drinks of alcohol per week.  She reports that she does not use drugs.      Family History:  family history includes Heart Surgery in her father.     Home Medications:  Were reviewed and are listed in nursing record and/or below  Home Medications           Prior to Admission medications Medication Sig Start Date End Date Taking? Authorizing Provider   clopidogrel (PLAVIX) 75 MG tablet TAKE 1 TABLET BY MOUTH EVERY DAY  Patient taking differently: Dr. Lia King to stop five days prior 8/31/20   Yes Jaskaran Monsalve MD   acetaminophen (TYLENOL) 500 MG tablet Take 500 mg by mouth every 6 hours as needed for Pain     Yes Historical Provider, MD   metFORMIN (GLUCOPHAGE) 500 MG tablet Take 500 mg by mouth as needed Pt states she takes as needed/ she goes back and forth with taking one a day \"here and there\"     Yes Historical Provider, MD   amLODIPine (NORVASC) 5 MG tablet Take 5 mg by mouth 2 times daily      Yes Historical Provider, MD   pravastatin (PRAVACHOL) 40 MG tablet Take 40 mg by mouth daily.     Yes Historical Provider, MD   gabapentin (NEURONTIN) 300 MG capsule Take 1 capsule by mouth 2 times daily for 180 days. Intended supply: 30 days 8/31/20 1/25/22   Jaskaran Monsalve MD         Allergies:  Ciprocinonide [fluocinolone]      Review of Systems: all reviewed and refer to HPI   · Constitutional: no unanticipated weight loss. There's been no change in energy level, sleep pattern, or activity level. No fevers, chills. · Eyes: No visual changes or diplopia. No scleral icterus. · ENT: No Headaches, hearing loss or vertigo. No mouth sores or sore throat. · Cardiovascular: No Chest pain, tightness or discomfort. · No Shortness of breath. No Dyspnea on exertion, Orthopnea, Paroxysmal nocturnal dyspnea or breathlessness at rest.  · No Palpitations. · No Syncope ('blackouts', 'faints', 'collapse') or dizziness. · Respiratory: No cough or wheezing, no sputum production. No hematemesis. · Gastrointestinal: No abdominal pain, appetite loss, blood in stools. No change in bowel or bladder habits. · Genitourinary: No dysuria, trouble voiding, or hematuria. · Musculoskeletal:  No gait disturbance, no joint complaints. · Integumentary: No rash or pruritis.   · Neurological: No headache, diplopia, change in muscle strength, numbness or tingling. · Psychiatric: No anxiety or depression. · Endocrine: No temperature intolerance. No excessive thirst, fluid intake, or urination. No tremor. · Hematologic/Lymphatic: No abnormal bruising or bleeding, blood clots or swollen lymph nodes. · Allergic/Immunologic: No nasal congestion or hives. · Vascular: BLE edema and pain         Objective:      PHYSICAL EXAM:       Vitals       Vitals:     02/25/22 1310   BP: 134/72   Pulse: 96   SpO2: 98%   Weight: 185 lb (83.9 kg)   Height: 5' 5\" (1.651 m)          Weight: 185 lb (83.9 kg)       General Appearance:  Alert, cooperative, no distress, appears stated age. Head:  Normocephalic, without obvious abnormality, atraumatic. Eyes:  Pupils equal and round. No scleral icterus. Mouth: Moist mucosa, no pharyngeal erythema. Nose: Nares normal. No drainage or sinus tenderness. Neck: Supple, symmetrical, trachea midline. No adenopathy. No tenderness/mass/nodules. No carotid bruit or elevated JVD. Lungs:   Respiratory Effort: Normal   Auscultation: Clear to auscultation bilaterally, respirations unlabored. No wheeze, rales   Chest Wall:  No tenderness or deformity. Cardiovascular:     Pulses  Palpation: normal   Ascultation: Regular rate, S1/ S2 normal. No murmur, rub, or gallop. 2+ radial and pedal pulses, symmetric  Carotid  Femoral  + doppler PT, DP on right but - none on left   Abdomen and Gastrointestinal:   Soft, non-tender, bowel sounds active. Liver and Spleen  Masses   Musculoskeletal: No muscle wasting  Back  Gait   Extremities: Extremities normal, atraumatic. No cyanosis or edema. No cyanosis clubbing         Skin: Inspection and palpation performed, no rashes or lesions. Pysch: Normal mood and affect.  Alert and oriented to time place person   Neurologic: Normal gross motor and sensory exam.       Labs      All labs have been reviewed           Lab Results   Component Value Date     WBC 8.0 12/03/2021   RBC 5.26 12/03/2021     HGB 15.8 12/03/2021     HCT 49.0 12/03/2021     MCV 93.1 12/03/2021     RDW 14.6 12/03/2021      12/03/2021            Lab Results   Component Value Date      12/03/2021     K 4.7 12/03/2021     CL 97 12/03/2021     CO2 21 12/03/2021     BUN 10 12/03/2021     CREATININE 1.0 12/03/2021     GFRAA >60 12/03/2021     GFRAA >60 03/15/2011     AGRATIO 1.2 12/03/2021     LABGLOM 54 12/03/2021     GLUCOSE 96 12/03/2021     PROT 7.0 12/03/2021     PROT 6.5 03/15/2011     CALCIUM 9.3 12/03/2021     BILITOT 0.3 12/03/2021     ALKPHOS 96 12/03/2021     AST 26 12/03/2021     ALT 16 12/03/2021      No results found for: PTINR  No results found for: LABA1C        Lab Results   Component Value Date     TROPONINI <0.01 04/15/2014         Cardiac, Vascular and Imaging Data all Personally Reviewed in Detail by Myself       EKG Interpretation: 2/25/2022 Sinus rhythm      Stress Test: 5/1/14 ([facility])   The LV EF is 81%  All segments are normal. There is a small sized, partially reversible defect in the apical wall. This defect is consistent with breast attenuation. 1.Non-diagnostic ECG for ischemia with pharmocologic stress. 2.Normal left ventriular systolic function. 3.Negative nuclear stress imaging for inducible ischemia. 4.Nuclear stress image findings indicate low risk for future ischemic event .     Stress test 9/15/2020   Pharmacological Stress/MPI Results:        1. Technically a satisfactory study.    2. Normal pharmacological stress portion of the study.    3. No evidence of Ischemia by Myocardial Perfusion Imaging.    4. Gated Study shows normal LV size and Systolic function; EF is 70 %.      ECHO 8/24/2021  Suboptimal image quality. Normal left ventricle size, wall thickness, and systolic function with an  estimated ejection fraction of 60-65%. No regional wall motion abnormalities  are seen. Normal right ventricular size and function. Trivial mitral regurgitation.   The aortic valve leaflets are not well visualized but appear thickened. No  stenosis based on doppler velocity/gradient.     Peripheral angiogram 12/30/14 ([facility])    Renal arteries are widely patent. Infrarenal aorta is ectatic. May have a small aneurysm.  The  common iliac, external and internal iliac arteries are patent. There is some mild disease but no high-grade stenosis noted. Right common femoral, profunda femoral, superficial  Femoral\arteries are patent. Distal SFA is occluded. Reconstitution of the popliteal and the  patient has two-vessel runoff on the right. Left common femoral and profunda femoral arteries  patent.  The patient has a fem below-the-knee pop bypass which appears widely patent.  Multiple views were obtained at both the proximal and distal anastomosis and I am unable to see any significant stenosis. The flow does appear to be slightly sluggish  compared to the right side, however, I do not see any evidence of stenosis. The patient has two vessel runoff to the left lower extremity.     Peripheral angiogram 4/28/15 (Dr. Piero Palmer)   Irregular right superficial femoral artery through the majority of its course without hemodynamically significant stenoses. The right superficial femoral artery and proximal above knee popliteal artery were heavily diseased and occluded for a distance of 3 cm at the adductor canal.  The right popliteal artery reconstituted at that level with widely patent below knee  runoff through the posterior tibial and peroneal arteries. Unsuccessful attempt at recanalization and angioplasty of the right  superficial femoral/popliteal artery occlusion.     Peripheral angiogram 10/21/19  Abdominal aorta has small aneurysm present.  It is tortuous.  Patent mesenteric and renals. Left and right common iliac arteries and external iliac arteries are patent. Left common femoral artery and profunda are patent. Left superficial femoral artery is occluded.   Left femoropopliteal graft is occluded. The patient has two-vessel run off below the knee. Placement of EKOS tPA catheter after performing mechanical thrombectomy of the left femoropopliteal graft.     LE arterial duplex 11/1/19  There is an KIYA of 0.7 on the right.    Elevated velocity of the proximal common femoral and mid femoral arteries    suggests a less than 50% stenosis.    Elevated velocity of the the proximal popliteal artery suggests a 50-70% stenosis     There is an KIYA of 0.9 on the left.    The left femoral to popliteal arterial bypass graft appears patent with    normal waveforms throughout.    The majority of the waveforms are biphasic throughout the left lower extremity       CTA abd aorta w bilat. Runoff:11/12/19  1. Right lower extremity: Findings most compatible with chronic occlusion of   the above the knee popliteal artery with distal reconstitution.  There is   three-vessel runoff to the level of the ankle.  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   2. Left lower extremity: Patent femoral to popliteal bypass graft with   findings most compatible with chronic occlusion of the superficial femoral   artery. Shira Noguera is three-vessel runoff to the level of the ankle. 3. Thin fluid collection within the subcutaneous fat of the right groin with   a suggestion of a defined wall, the possibility of an abscess cannot be   entirely excluded, correlate with visual inspection.  This measures at least   6 cm in the transverse dimension but less than 2 cm in thickness.  Please see   body of report for details. 4. Aneurysmal dilatation of the abdominal aorta measuring up to 5 cm in   greatest AP dimension as described in body of report.    5. Indeterminate 1.6 cm left adrenal nodule, this can be further evaluated   via dedicated outpatient adrenal protocol MRI.      CT abdomen and pelvis 6/23/2020  Cellulitis of the left inguinal tissues but no abscess.       5.6 cm saccular abdominal aortic aneurysm. Recommend referral to a vascular   specialist.      BLE arterial Doppler 8/24/2021  Right Impression   Right KIYA 0.81. This is consistent with mild PAD at rest.   Elevated velocity of the mid superficial femoral suggests a less than 50%   stenosis. Possible occlusion of the right peroneal artery. Left Impression   Left KIYA 0.58. This is consistent with moderate PAD at rest.   Possible occlusion of the proximal popliteal artery. Occlusion of the left femoral- popliteal arterial bypass graft. Elevated velocity of the proximal superficial femoral artery suggests a less   than 50% stenosis.             Assessment and Plan      Peripheral Arterial Disease and Claudication  Occlusion of the femoropopliteal graft with popliteal artery occlusion. Intermittent pain. Continue Asa, and Plavix. Discontinue Eliquis. I have discussed with her options of peripheral angiogram or medical management. She is agreeable to proceed with peripheral angiogram. SEncouraged regular walking program.      I had a detail discussion with the patient and the family members regarding the risk and benefit of the procedures. I explained to them the details of the procedure. I explained to them that the procedure will be performed using moderate sedation and local anaesthesia using lidocaine.      I have presented reasonable alternatives to the patient's proposed care, treatment, and services. The discussion I have done encompassed risks, benefits, and side effects related to the alternatives and the risks related to not receiving the proposed care, treatment, and services.     The patient and the family members understood the risk and benefits and the details of procedure and would like to go ahead with the procedure. The patient gave informed consent.     Abdominal aortic aneurysm   S/p open repair 10/16/2020. Managed by Dr Anastacia Fisher.     Hyperlipidemia, mixed   Continue Pravachol 40 mg daily.    Essential Hypertension  Controlled. Continue current medical management.      Murmur  Asymptomatic.      Nicotine Addiction. Occasionally smokes. I have spent 3-5 minutes on smoking cessation.      Diabetes   Managed per PCP     Follow up after procedure.      Thank you for allowing us to participate in the care of [facility].   Please do not hesitate to contact me if you have any questions.     Fidelia Jones MD, MPH     [facility], [place], operated by [facility], [address] [place] [address], [place] [address]  Ph: [phone]  Fax: [phone]

## 2022-04-04 NOTE — PROCEDURES
Moderate Sedation:  Start time: 848 am  Stop time: 0955 am  4 mg versed   100 mcg fentanyl   An independent trained observer pushed medications at my direction. We monitored the patient's level of consciousness and vital signs/physiologic status throughout the procedure duration (see start and start times above).

## 2022-04-05 ENCOUNTER — HOSPITAL ENCOUNTER (OUTPATIENT)
Dept: GENERAL RADIOLOGY | Age: 72
Discharge: HOME | End: 2022-04-05
Payer: MEDICARE

## 2022-04-05 ENCOUNTER — HOSPITAL ENCOUNTER (OUTPATIENT)
Age: 72
Discharge: HOME | End: 2022-04-05
Payer: MEDICARE

## 2022-04-05 DIAGNOSIS — M25.562 LEFT KNEE PAIN, UNSPECIFIED CHRONICITY: ICD-10-CM

## 2022-04-05 DIAGNOSIS — M51.36 DDD (DEGENERATIVE DISC DISEASE), LUMBAR: ICD-10-CM

## 2022-04-05 DIAGNOSIS — M25.552 LEFT HIP PAIN: ICD-10-CM

## 2022-04-05 PROCEDURE — 73560 X-RAY EXAM OF KNEE 1 OR 2: CPT

## 2022-04-05 PROCEDURE — 72100 X-RAY EXAM L-S SPINE 2/3 VWS: CPT

## 2022-04-05 PROCEDURE — 73502 X-RAY EXAM HIP UNI 2-3 VIEWS: CPT

## 2022-04-06 ENCOUNTER — TELEPHONE (OUTPATIENT)
Dept: CARDIOLOGY CLINIC | Age: 72
End: 2022-04-06

## 2022-04-06 NOTE — TELEPHONE ENCOUNTER
Juvenal Mendoza called in this morning stating that she was prescribed Xarelto on Monday 4/4, but currently on Plavix. She read the instructions on the Xarelto, where is said don't take while on Plavix or Aspirin. She is concerned on what she should be taking, and wants to talk to Dr. Sujatha Knight or his RN.      You can reach Juvenal Mendoza at #[phone]

## 2022-04-06 NOTE — TELEPHONE ENCOUNTER
Returned call to patient and let her know that she should be on Plavix daily and Xarelto 2.5 mg BID. She verbalized understanding. She also said that she had XR yesterday to see if she has something going on with her spine. Encouraged her to call with any further concerns.

## 2022-04-11 ENCOUNTER — OFFICE VISIT (OUTPATIENT)
Dept: ORTHOPEDIC SURGERY | Age: 72
End: 2022-04-11
Payer: MEDICARE

## 2022-04-11 VITALS — WEIGHT: 185 LBS | RESPIRATION RATE: 20 BRPM | BODY MASS INDEX: 30.82 KG/M2 | HEIGHT: 65 IN

## 2022-04-11 DIAGNOSIS — M54.16 LUMBAR RADICULAR PAIN: Primary | ICD-10-CM

## 2022-04-11 DIAGNOSIS — M16.12 ARTHRITIS OF LEFT HIP: ICD-10-CM

## 2022-04-11 PROCEDURE — 99204 OFFICE O/P NEW MOD 45 MIN: CPT | Performed by: PHYSICIAN ASSISTANT

## 2022-04-14 ENCOUNTER — HOSPITAL ENCOUNTER (OUTPATIENT)
Dept: GENERAL RADIOLOGY | Age: 72
Discharge: HOME | End: 2022-04-14
Payer: MEDICARE

## 2022-04-14 ENCOUNTER — OFFICE VISIT (OUTPATIENT)
Dept: ORTHOPEDIC SURGERY | Age: 72
End: 2022-04-14
Payer: MEDICARE

## 2022-04-14 DIAGNOSIS — M16.12 ARTHRITIS OF LEFT HIP: ICD-10-CM

## 2022-04-14 DIAGNOSIS — M16.12 PRIMARY OSTEOARTHRITIS OF LEFT HIP: Primary | ICD-10-CM

## 2022-04-14 PROCEDURE — 2500000003 HC RX 250 WO HCPCS

## 2022-04-14 PROCEDURE — 77002 NEEDLE LOCALIZATION BY XRAY: CPT | Performed by: ORTHOPAEDIC SURGERY

## 2022-04-14 PROCEDURE — 20610 DRAIN/INJ JOINT/BURSA W/O US: CPT | Performed by: ORTHOPAEDIC SURGERY

## 2022-04-14 PROCEDURE — 20610 DRAIN/INJ JOINT/BURSA W/O US: CPT

## 2022-04-14 PROCEDURE — 6360000002 HC RX W HCPCS

## 2022-04-14 PROCEDURE — 77002 NEEDLE LOCALIZATION BY XRAY: CPT

## 2022-04-14 PROCEDURE — 99999 PR OFFICE/OUTPT VISIT,PROCEDURE ONLY: CPT | Performed by: ORTHOPAEDIC SURGERY

## 2022-04-25 ENCOUNTER — OFFICE VISIT (OUTPATIENT)
Dept: ORTHOPEDIC SURGERY | Age: 72
End: 2022-04-25
Payer: MEDICARE

## 2022-04-25 ENCOUNTER — NURSE ONLY (OUTPATIENT)
Dept: CARDIOLOGY CLINIC | Age: 72
End: 2022-04-25

## 2022-04-25 VITALS — WEIGHT: 185 LBS | BODY MASS INDEX: 30.82 KG/M2 | HEIGHT: 65 IN

## 2022-04-25 DIAGNOSIS — M17.12 ARTHRITIS OF LEFT KNEE: Primary | ICD-10-CM

## 2022-04-25 PROCEDURE — 99213 OFFICE O/P EST LOW 20 MIN: CPT | Performed by: PHYSICIAN ASSISTANT

## 2022-04-25 PROCEDURE — 20610 DRAIN/INJ JOINT/BURSA W/O US: CPT | Performed by: PHYSICIAN ASSISTANT

## 2022-04-25 RX ORDER — TRIAMCINOLONE ACETONIDE 40 MG/ML
40 INJECTION, SUSPENSION INTRA-ARTICULAR; INTRAMUSCULAR ONCE
Status: COMPLETED | OUTPATIENT
Start: 2022-04-25 | End: 2022-04-25

## 2022-04-25 RX ORDER — BUPIVACAINE HYDROCHLORIDE 2.5 MG/ML
2 INJECTION, SOLUTION INFILTRATION; PERINEURAL ONCE
Status: COMPLETED | OUTPATIENT
Start: 2022-04-25 | End: 2022-04-25

## 2022-04-25 RX ADMIN — TRIAMCINOLONE ACETONIDE 40 MG: 40 INJECTION, SUSPENSION INTRA-ARTICULAR; INTRAMUSCULAR at 10:15

## 2022-04-25 RX ADMIN — BUPIVACAINE HYDROCHLORIDE 5 MG: 2.5 INJECTION, SOLUTION INFILTRATION; PERINEURAL at 10:14

## 2022-04-25 NOTE — PROGRESS NOTES
Subjective:      Patient ID: Lavenia Pallas is a 66 y.o.  female. HPI: She is here for follow up after left hip intra-articular injection for left hip pain, left leg pain and low back pain. Status post recent hip intraarticular injection on 4/11/2022. She states symptoms improved 90% with the recent intraarticular cortisone injection. Pain is on average 0-2/10 VAS. Pain is worse with increased activity/ weight bearing. Pain improves with rest/ elevation. She does report lateral knee pain 4/10 VAS. Review of Systems:   Negative for fever or chills. Negative for perceived weakness into the lower extremities.     Past Medical History:   Diagnosis Date    Arthritis     osteoarthritis    Back pain     CAD (coronary artery disease)     Cellulitis     COPD (chronic obstructive pulmonary disease) (HCC)     pt unsure    Depression     Diabetes mellitus (HCC)     [facility]     History of femoral angiogram 4/30/2015    Hyperlipidemia     Hypertension     PVD (peripheral vascular disease) ([address]        Family History   Problem Relation Age of Onset    Heart Surgery Father        Past Surgical History:   Procedure Laterality Date    ABDOMINAL AORTIC ANEURYSM REPAIR N/A 10/16/2020    OPEN REPAIR ABDOMINAL AORTIC ANEURYSM performed by Elizabeth Schaeffer MD at [address] [place] [address] Rd Right 2/19/16    I and D right gluteal abscess    CARDIAC CATHETERIZATION      CHOLECYSTECTOMY      COLONOSCOPY N/A 2/7/2022    COLONOSCOPY POLYPECTOMY SNARE/COLD BIOPSY performed by Tanya Camarena MD at [facility] OTHER SURGICAL HISTORY Left 9-    excisional debridement 3.5 cm x 5 cm abscess    VASCULAR SURGERY Left     fem-tib       Social History     Occupational History    Not on file   Tobacco Use    Smoking status: Current Every Day Smoker     Packs/day: 0.50     Years: 45.00     Pack years: 22.50     Types: Cigarettes Last attempt to quit: 10/26/2019     Years since quittin.4    Smokeless tobacco: Never Used    Tobacco comment: been cutting back with electronic cig's-pt states she is vaping- but not often   Vaping Use    Vaping Use: Some days    Substances: Always   Substance and Sexual Activity    Alcohol use: Yes     Alcohol/week: 3.8 standard drinks     Types: 3 Cans of beer, 1 Standard drinks or equivalent per week    Drug use: No    Sexual activity: Not Currently       Current Outpatient Medications   Medication Sig Dispense Refill    rivaroxaban (XARELTO) 2.5 MG TABS tablet Take 1 tablet by mouth 2 times daily 60 tablet 3    clopidogrel (PLAVIX) 75 MG tablet TAKE 1 TABLET BY MOUTH EVERY DAY (Patient taking differently: Dr. Austin Boards to stop five days prior) 30 tablet 3    acetaminophen (TYLENOL) 500 MG tablet Take 500 mg by mouth every 6 hours as needed for Pain      metFORMIN (GLUCOPHAGE) 500 MG tablet Take 500 mg by mouth as needed Pt states she takes as needed/ she goes back and forth with taking one a day \"here and there\"      amLODIPine (NORVASC) 5 MG tablet Take 5 mg by mouth 2 times daily       pravastatin (PRAVACHOL) 40 MG tablet Take 40 mg by mouth daily.  gabapentin (NEURONTIN) 300 MG capsule Take 1 capsule by mouth 2 times daily for 180 days. Intended supply: 30 days 180 capsule 0     No current facility-administered medications for this visit. Objective:     She is alert, oriented x 3, pleasant, well nourished, developed and in no acute distress. Ht 5' 5\" (1.651 m)   Wt 185 lb (83.9 kg)   BMI 30.79 kg/m²      Examination of the left hip shows:  No discoloration, wounds or gross deformity. Palpation of the greater trochanter/bursa-nontender. Stinchfeld resisted hip flexion test negative. Gait ( Nml, Antalgic, Trendelenberg)-normal.      Examination of the left knee: Inspection of the skin and soft tissues reveals no swelling or erythema.   The overall alignment of the knee is neutral.  Gait is normal.  There is minimal intra-articular effusion. AROM:           PROM:  Extension-         0                   0  Flexion -           125                   130  There mild pain associated with ROM testing. Medial joint line is not tender to palpation. Lateral joint line is tender to palpation. Pes anserine bursa not tender to palpation. Patellar tendon is not tender to palpation. Quadriceps tendon is not tender to palpation. Collateral ligaments is not tender to palpation. Popliteal fossa is not tender to palpation. Varus Stress testing negative for instability. Valgus Stress testing negative for instability. Patellar Compression testing positive for pain or crepitus. Extensor Mechanism is intact. Examination of the lower extremities are intact with sensation to light touch. Motor testing 4+ to  5/5 in all major motor groups of the lower extremities. SLR negative. Examination of the lower extremities shows intact perfusion to all extremities. No cyanosis. Digits are warm to touch, capillary refill is less than 2 seconds. no edema noted. Examination of the skin over both lower extremities reveals: The skin to be intact without lacerations or abrasions. No significant erythema. No rashes or skin lesions. X Rays: not performed in the office today:   Previous x-rays reviewed of the left knee shows mild left knee arthritis. These were not taken weightbearing. Assessment:       ICD-10-CM    1. Arthritis of left knee  M17.12 IN ARTHROCENTESIS ASPIR&/INJ MAJOR JT/BURSA W/O US     triamcinolone acetonide (KENALOG-40) injection 40 mg     bupivacaine (MARCAINE) 0.25 % injection 5 mg        Plan:       Assessment:  Low back pain and left hip pain improved after left hip intra-articular injection. Still having left lateral knee pain. Left knee pain may be related to early degenerative arthritis.   This also could be referred pain from her hip. Discussed Total Hip Arthroplasty as the surgical treatment of hip arthritis. When the symptoms affect ADL's and prohibit performing enjoyable activity, Total Hip Arthroplasty should be considered. Weight loss, activity modification, home exercise therapy program, NSAID'S, dietary changes have been discussed as a means to help control the symptoms. Plan:  Medications- Tylenol    PT- A home exercise program was instructed today including ROM exercises and strengthening exercises. The patient verbalized understanding of these exercises as well as the importance of the exercise program to promote return of normal function. If pain intensifies or other problems arise you are to notify the office. Procedures- Risk and benefits of corticosteroid intra-articular injection was discussed today. All questions were answered to her satisfaction. She verbally consented to proceed with intra-articular injection today. Cortisone Injection                                                       PROCEDURE NOTE:     Pre op Diagnosis:  left knee pain     Post op Diagnosis: Same  With the Petra Johnson permission, her left knee was prepped  in standard sterile fashion with  Alcohol and 2 cc of 0.25% Marcaine and 1 cc of Kenalog 40 mg was injected into the left lateral compartment  without difficulty. The patient tolerated this well without difficulty. A band-aid was applied. The patient was advised to ice the knee for 15-20 minutes to relieve any injection site related pain. Follow up- 6 weeks. Call or return to clinic if these symptoms worsen or fail to improve as anticipated.

## 2022-04-25 NOTE — PROGRESS NOTES
Patient stopped in the office today with concerns of some bruising. Leg does have some bruising. She states that the pain has improved. I have made her follow up to see Dr Rafael De Jesus. Leg warm to touch and normal in color.

## 2022-04-26 NOTE — PROGRESS NOTES
[facility]  Cardiology Progress Note    Sabrina Gregory  1950 April 28, 2022      Reason for Referral: PAD     CC: \"I am doing okay\"     Subjective:     History of Present Illness:    Sabrina Gregory is a 66 y.o. patient with a PMH significant for PAD with known right SFA occlusion hyperlipidemia, hypertension and type 2 diabetes who presents today for management of PAD. She was admitted to [facility] with worsening claudication and underwent a peripheral angiogram. She had EKOS and thrombectomy of left femoropopliteal graft. Today, she is here for follow up s/p peripheral angiogram. She states that he did have injection in her knee and back. She continues to have intermittent pain in her leg but has improved. She does notice bruising since being on the Xarelto. Patient denies exertional chest pain/pressure, dyspnea at rest, PIMENTEL, PND, orthopnea, palpitations, lightheadedness, weight changes, changes in LE edema, and syncope. Patient reports compliance to her medications. Past Medical History:   has a past medical history of Arthritis, Back pain, CAD (coronary artery disease), Cellulitis, COPD (chronic obstructive pulmonary disease) ([address] Depression, Diabetes mellitus ([address] Enlarging abdominal aortic aneurysm (AAA) ([address] History of femoral angiogram, Hyperlipidemia, Hypertension, and PVD (peripheral vascular disease) ([address] Surgical History:   has a past surgical history that includes Cholecystectomy; hernia repair; other surgical history (Left, 9-); vascular surgery (Left); Abscess Drainage (Right, 2/19/16); Cardiac catheterization; Abdominal aortic aneurysm repair (N/A, 10/16/2020); and Colonoscopy (N/A, 2/7/2022). Social History:   reports that she has been smoking cigarettes. She has a 22.50 pack-year smoking history. She has never used smokeless tobacco. She reports current alcohol use of about 3.8 standard drinks of alcohol per week. She reports that she does not use drugs. pain, appetite loss, blood in stools. No change in bowel or bladder habits. · Genitourinary: No dysuria, trouble voiding, or hematuria. · Musculoskeletal:  No gait disturbance, no joint complaints. · Integumentary: No rash or pruritis. · Neurological: No headache, diplopia, change in muscle strength, numbness or tingling. · Psychiatric: No anxiety or depression. · Endocrine: No temperature intolerance. No excessive thirst, fluid intake, or urination. No tremor. · Hematologic/Lymphatic: No abnormal bruising or bleeding, blood clots or swollen lymph nodes. · Allergic/Immunologic: No nasal congestion or hives. · Vascular: BLE edema and pain       Objective:     PHYSICAL EXAM:      Vitals:    04/28/22 0904 04/28/22 0907   BP: (!) 146/80 (!) 148/84   Pulse: 87    SpO2: 99%    Weight: 185 lb (83.9 kg)    Height: 5' 5\" (1.651 m)       Weight: 185 lb (83.9 kg)       General Appearance:  Alert, cooperative, no distress, appears stated age. Head:  Normocephalic, without obvious abnormality, atraumatic. Eyes:  Pupils equal and round. No scleral icterus. Mouth: Moist mucosa, no pharyngeal erythema. Nose: Nares normal. No drainage or sinus tenderness. Neck: Supple, symmetrical, trachea midline. No adenopathy. No tenderness/mass/nodules. No carotid bruit or elevated JVD. Lungs:   Respiratory Effort: Normal   Auscultation: Clear to auscultation bilaterally, respirations unlabored. No wheeze, rales   Chest Wall:  No tenderness or deformity. Cardiovascular:    Pulses  Palpation: normal   Ascultation: Regular rate, S1/ S2 normal. No murmur, rub, or gallop. 2+ radial and pedal pulses, symmetric  Carotid  Femoral  + doppler PT, DP on right but - none on left   Abdomen and Gastrointestinal:   Soft, non-tender, bowel sounds active. Liver and Spleen  Masses   Musculoskeletal: No muscle wasting  Back  Gait   Extremities: Extremities normal, atraumatic. No cyanosis or edema.  No cyanosis clubbing       Skin: Inspection and palpation performed, no rashes or lesions. Pysch: Normal mood and affect. Alert and oriented to time place person   Neurologic: Normal gross motor and sensory exam.       Labs     All labs have been reviewed    Lab Results   Component Value Date    WBC 7.6 03/28/2022    RBC 5.12 03/28/2022    HGB 15.4 03/28/2022    HCT 46.2 03/28/2022    MCV 90.2 03/28/2022    RDW 14.3 03/28/2022     03/28/2022     Lab Results   Component Value Date     03/28/2022    K 4.3 03/28/2022    K 4.7 12/03/2021     03/28/2022    CO2 28 03/28/2022    BUN 11 03/28/2022    CREATININE 1.1 03/28/2022    GFRAA 59 03/28/2022    GFRAA >60 03/15/2011    AGRATIO 1.2 12/03/2021    LABGLOM 49 03/28/2022    GLUCOSE 107 03/28/2022    PROT 7.0 12/03/2021    PROT 6.5 03/15/2011    CALCIUM 9.7 03/28/2022    BILITOT 0.3 12/03/2021    ALKPHOS 96 12/03/2021    AST 26 12/03/2021    ALT 16 12/03/2021     No results found for: PTINR  No results found for: LABA1C  Lab Results   Component Value Date    TROPONINI <0.01 04/15/2014       Cardiac, Vascular and Imaging Data all Personally Reviewed in Detail by Myself      EKG Interpretation: 2/25/2022 Sinus rhythm     Stress Test: 5/1/14 ([facility])   The LV EF is 81%  All segments are normal. There is a small sized, partially reversible defect in the apical wall. This defect is consistent with breast attenuation. 1.Non-diagnostic ECG for ischemia with pharmocologic stress. 2.Normal left ventriular systolic function. 3.Negative nuclear stress imaging for inducible ischemia. 4.Nuclear stress image findings indicate low risk for future ischemic event . Stress test 9/15/2020   Pharmacological Stress/MPI Results:        1. Technically a satisfactory study.    2. Normal pharmacological stress portion of the study.    3. No evidence of Ischemia by Myocardial Perfusion Imaging.    4. Gated Study shows normal LV size and Systolic function; EF is 70 %.      ECHO 8/24/2021  Suboptimal image quality. Normal left ventricle size, wall thickness, and systolic function with an  estimated ejection fraction of 60-65%. No regional wall motion abnormalities  are seen. Normal right ventricular size and function. Trivial mitral regurgitation. The aortic valve leaflets are not well visualized but appear thickened. No  stenosis based on doppler velocity/gradient. Peripheral angiogram 12/30/14 ([facility])    Renal arteries are widely patent. Infrarenal aorta is ectatic. May have a small aneurysm.  The  common iliac, external and internal iliac arteries are patent. There is some mild disease but no high-grade stenosis noted. Right common femoral, profunda femoral, superficial  Femoral\arteries are patent. Distal SFA is occluded. Reconstitution of the popliteal and the  patient has two-vessel runoff on the right. Left common femoral and profunda femoral arteries  patent.  The patient has a fem below-the-knee pop bypass which appears widely patent.  Multiple views were obtained at both the proximal and distal anastomosis and I am unable to see any significant stenosis. The flow does appear to be slightly sluggish  compared to the right side, however, I do not see any evidence of stenosis. The patient has two vessel runoff to the left lower extremity. Peripheral angiogram 4/28/15 (Dr. Namita Garrett)   Irregular right superficial femoral artery through the majority of its course without hemodynamically significant stenoses. The right superficial femoral artery and proximal above knee popliteal artery were heavily diseased and occluded for a distance of 3 cm at the adductor canal.  The right popliteal artery reconstituted at that level with widely patent below knee  runoff through the posterior tibial and peroneal arteries. Unsuccessful attempt at recanalization and angioplasty of the right  superficial femoral/popliteal artery occlusion.      Peripheral angiogram 10/21/19  Abdominal aorta has small aneurysm present. It is tortuous. Patent mesenteric and renals. Left and right common iliac arteries and external iliac arteries are patent. Left common femoral artery and profunda are patent. Left superficial femoral artery is occluded. Left femoropopliteal graft is occluded. The patient has two-vessel run off below the knee. Placement of EKOS tPA catheter after performing mechanical thrombectomy of the left femoropopliteal graft. LE arterial duplex 11/1/19  There is an KIYA of 0.7 on the right.    Elevated velocity of the proximal common femoral and mid femoral arteries    suggests a less than 50% stenosis.    Elevated velocity of the the proximal popliteal artery suggests a 50-70% stenosis     There is an KIYA of 0.9 on the left.    The left femoral to popliteal arterial bypass graft appears patent with    normal waveforms throughout.    The majority of the waveforms are biphasic throughout the left lower extremity      CTA abd aorta w bilat. Runoff:11/12/19  1. Right lower extremity: Findings most compatible with chronic occlusion of   the above the knee popliteal artery with distal reconstitution.  There is   three-vessel runoff to the level of the ankle.  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   2. Left lower extremity: Patent femoral to popliteal bypass graft with   findings most compatible with chronic occlusion of the superficial femoral   artery. Dewitte Peto is three-vessel runoff to the level of the ankle. 3. Thin fluid collection within the subcutaneous fat of the right groin with   a suggestion of a defined wall, the possibility of an abscess cannot be   entirely excluded, correlate with visual inspection.  This measures at least   6 cm in the transverse dimension but less than 2 cm in thickness.  Please see   body of report for details.    4. Aneurysmal dilatation of the abdominal aorta measuring up to 5 cm in   greatest AP dimension as described in body of report. 5. Indeterminate 1.6 cm left adrenal nodule, this can be further evaluated   via dedicated outpatient adrenal protocol MRI. CT abdomen and pelvis 6/23/2020  Cellulitis of the left inguinal tissues but no abscess.       5.6 cm saccular abdominal aortic aneurysm. Recommend referral to a vascular   specialist.     BLE arterial Doppler 8/24/2021  Right Impression   Right KIYA 0.81. This is consistent with mild PAD at rest.   Elevated velocity of the mid superficial femoral suggests a less than 50%   stenosis. Possible occlusion of the right peroneal artery. Left Impression   Left KIYA 0.58. This is consistent with moderate PAD at rest.   Possible occlusion of the proximal popliteal artery. Occlusion of the left femoral- popliteal arterial bypass graft. Elevated velocity of the proximal superficial femoral artery suggests a less   than 50% stenosis. Peripheral angiogram 4/4/2022  ANGIOGRAPHY FINDINGS:  1. Abdominal aorta is patent. 2.  Left common iliac artery 50% stenosis. Right common iliac artery  50% stenosis. 3.  Left superficial femoral artery completely occluded. Profunda  femoris and external iliac arteries are patent. 4.  Left anterior tibial artery and posterior tibial artery are patent.     SUMMARY:  Successful revascularization of left superficial femoral  artery. Balloon angioplasty alone with a 6-mm drug-coated balloon. Assessment and Plan     Peripheral Arterial Disease and Claudication  S/p Successful revascularization of left superficial femoral  artery. Balloon angioplasty alone with a 6-mm drug-coated balloon. Intermittent pain with walking. Continue Xarelto and Plavix. Encouraged regular walking progrmam. Repeat bilateral lower extremity arterial Doppler. I checked  ankle-brachial index in the office. Left lower extremity KIYA is normal.      Abdominal aortic aneurysm   S/p open repair 10/16/2020. Managed by Dr Travis Tabares.     Hyperlipidemia, mixed   Continue Pravachol 40 mg daily. Essential Hypertension  Controlled. Continue current medical management. Murmur  Asymptomatic. Nicotine Addiction. Occasionally smokes. I have spent 3-5 minutes on smoking cessation. Diabetes   Managed per PCP    Follow up in 6 months. Thank you for allowing us to participate in the care of Concha Del Toro. Please do not hesitate to contact me if you have any questions. Akhil Weiss MD, MPH    [facility], [address] [place] [address] Mookie Campuzano [facility]  Ph: [phone]  Fax: [phone]      This note was scribed in the presence of Dr Graciela Hernandes, by Marin Guzman RN  Physician Attestation:  The scribes documentation has been prepared under my direction and personally reviewed by me in its entirety. I confirm that the note above accurately reflects all work, treatment, procedures, and medical decision making performed by me.

## 2022-04-28 ENCOUNTER — OFFICE VISIT (OUTPATIENT)
Dept: CARDIOLOGY CLINIC | Age: 72
End: 2022-04-28
Payer: MEDICARE

## 2022-04-28 VITALS
SYSTOLIC BLOOD PRESSURE: 148 MMHG | HEART RATE: 87 BPM | HEIGHT: 65 IN | WEIGHT: 185 LBS | BODY MASS INDEX: 30.82 KG/M2 | OXYGEN SATURATION: 99 % | DIASTOLIC BLOOD PRESSURE: 84 MMHG

## 2022-04-28 DIAGNOSIS — E78.2 MIXED HYPERLIPIDEMIA: ICD-10-CM

## 2022-04-28 DIAGNOSIS — I73.9 PAD (PERIPHERAL ARTERY DISEASE) (HCC): Primary | ICD-10-CM

## 2022-04-28 DIAGNOSIS — I10 ESSENTIAL HYPERTENSION: ICD-10-CM

## 2022-04-28 PROCEDURE — 99214 OFFICE O/P EST MOD 30 MIN: CPT | Performed by: INTERNAL MEDICINE

## 2022-04-28 NOTE — PATIENT INSTRUCTIONS
Patient Education        Stopping Smoking: Care Instructions  Your Care Instructions     Cigarette smokers crave the nicotine in cigarettes. Giving it up is much harder than simply changing a habit. Your body has to stop craving the nicotine. It is hard to quit, but you can do it. There are many tools that people use to quitsmoking. You may find that combining tools works best for you. There are several steps to quitting. First you get ready to quit. Then you get support to help you. After that, you learn new skills and behaviors to become anonsmoker. For many people, a necessary step is getting and using medicine. Your doctor will help you set up the plan that best meets your needs. You may want to attend a smoking cessation program to help you quit smoking. When you choose a program, look for one that has proven success. Ask your doctor for ideas. You will greatly increase your chances of success if you take medicineas well as get counseling or join a cessation program.  Some of the changes you feel when you first quit tobacco are uncomfortable. Your body will miss the nicotine at first, and you may feel short-tempered and grumpy. You may have trouble sleeping or concentrating. Medicine can help you deal with these symptoms. You may struggle with changing your smoking habits and rituals. The last step is the tricky one: Be prepared for the smoking urge to continue for a time. This is a lot to deal with, but keep at it. You willfeel better. Follow-up care is a key part of your treatment and safety. Be sure to make and go to all appointments, and call your doctor if you are having problems. It's also a good idea to know your test results and keep alist of the medicines you take. How can you care for yourself at home?  Ask your family, friends, and coworkers for support. You have a better chance of quitting if you have help and support.    Join a support group, such as Nicotine Anonymous, for people who are trying to quit smoking.  Consider signing up for a smoking cessation program, such as the American Lung Association's Freedom from Smoking program.   Get text messaging support. Go to the website at www.smokefree. gov to sign up for the [facility] program.   Set a quit date. Pick your date carefully so that it is not right in the middle of a big deadline or stressful time. Once you quit, do not even take a puff. Get rid of all ashtrays and lighters after your last cigarette. Clean your house and your clothes so that they do not smell of smoke.  Learn how to be a nonsmoker. Think about ways you can avoid those things that make you reach for a cigarette. ? Avoid situations that put you at greatest risk for smoking. For some people, it is hard to have a drink with friends without smoking. For others, they might skip a coffee break with coworkers who smoke. ? Change your daily routine. Take a different route to work or eat a meal in a different place.  Cut down on stress. Calm yourself or release tension by doing an activity you enjoy, such as reading a book, taking a hot bath, or gardening.  Talk to your doctor or pharmacist about nicotine replacement therapy, which replaces the nicotine in your body. You still get nicotine but you do not use tobacco. Nicotine replacement products help you slowly reduce the amount of nicotine you need. These products come in several forms, many of them available over-the-counter:  ? Nicotine patches  ? Nicotine gum and lozenges  ? Nicotine inhaler   Ask your doctor about bupropion (Wellbutrin) or varenicline (Chantix), which are prescription medicines. They do not contain nicotine. They help you by reducing withdrawal symptoms, such as stress and anxiety.  Some people find hypnosis, acupuncture, and massage helpful for ending the smoking habit.  Eat a healthy diet and get regular exercise.  Having healthy habits will help your body move past its craving for nicotine.  Be prepared to keep trying. Most people are not successful the first few times they try to quit. Do not get mad at yourself if you smoke again. Make a list of things you learned and think about when you want to try again, such as next week, next month, or next year. Where can you learn more? Go to https://[employer]pebriannaewvelia.[employer]. org and sign in to your [employer] account. Enter C930 in the Hachimen[employer]ppi box to learn more about \"Stopping Smoking: Care Instructions. \"     If you do not have an account, please click on the \"Sign Up Now\" link. Current as of: October 28, 2021               Content Version: 13.2  © 2006-2022 Healthwise, Incorporated. Care instructions adapted under license by [facility] ([facility]). If you have questions about a medical condition or this instruction, always ask your healthcare professional. [address] any warranty or liability for your use of this information.

## 2022-05-31 ENCOUNTER — HOSPITAL ENCOUNTER (OUTPATIENT)
Dept: VASCULAR LAB | Age: 72
Discharge: HOME | End: 2022-05-31
Payer: COMMERCIAL

## 2022-05-31 ENCOUNTER — TELEPHONE (OUTPATIENT)
Dept: CARDIOLOGY CLINIC | Age: 72
End: 2022-05-31

## 2022-05-31 DIAGNOSIS — I73.9 PAD (PERIPHERAL ARTERY DISEASE) (HCC): ICD-10-CM

## 2022-05-31 PROCEDURE — 93925 LOWER EXTREMITY STUDY: CPT

## 2022-05-31 NOTE — TELEPHONE ENCOUNTER
Patient called and says that her foot is on \"fire\". Her toes are red. She states that she was on her feet from Wednesday to Saturday. She states that she cannot walk on the ball of her foot. Let her know that she should proceed with the Doppler as scheduled. Will await results.

## 2022-06-01 NOTE — TELEPHONE ENCOUNTER
Called and confirmed with patient which foot she was having the pain. She says it is her left. I discussed with Dr Chano Maier and he reviewed the Doppler and would like her to see Rosemary Morales tomorrow. Nuha Strickland is agreeable.  I have her scheduled at 2:20

## 2022-07-07 NOTE — PROGRESS NOTES
[address]  Cardiology Progress Note    Erica Chauhan  1950 July 8, 2022      Reason for Referral: PAD     CC: \"I have swelling at times. \"     Subjective:     History of Present Illness:    Erica Chauhan is a 74 y.o. patient with a PMH significant for PAD with known right SFA occlusion hyperlipidemia, hypertension and type 2 diabetes who presents today for management of PAD. She was admitted to [facility] with worsening claudication and underwent a peripheral angiogram. She had EKOS and thrombectomy of left femoropopliteal graft. Today, she is here with increased lower extremity edema. She states that at times her small toe and big toes are red and blue at times. She states that the ball of her foot is painful. Patient denies exertional chest pain/pressure, dyspnea at rest, PIMENTEL, PND, orthopnea, palpitations, lightheadedness, weight changes, changes in LE edema, and syncope. Patient reports compliance to her medications. Past Medical History:   has a past medical history of Arthritis, Back pain, CAD (coronary artery disease), Cellulitis, COPD (chronic obstructive pulmonary disease) ([address] Depression, Diabetes mellitus ([address] Enlarging abdominal aortic aneurysm (AAA) ([address] History of femoral angiogram, Hyperlipidemia, Hypertension, and PVD (peripheral vascular disease) ([address] Surgical History:   has a past surgical history that includes Cholecystectomy; hernia repair; other surgical history (Left, 9-); vascular surgery (Left); Abscess Drainage (Right, 2/19/16); Cardiac catheterization; Abdominal aortic aneurysm repair (N/A, 10/16/2020); and Colonoscopy (N/A, 2/7/2022). Social History:   reports that she has been smoking cigarettes. She has a 22.50 pack-year smoking history. She has never used smokeless tobacco. She reports current alcohol use of about 3.8 standard drinks of alcohol per week. She reports that she does not use drugs.      Family History:  family history includes Heart Surgery in her father. Home Medications:  Were reviewed and are listed in nursing record and/or below  Prior to Admission medications    Medication Sig Start Date End Date Taking? Authorizing Provider   rivaroxaban (XARELTO) 2.5 MG TABS tablet Take 1 tablet by mouth 2 times daily 4/4/22  Yes Kalpana Gibson MD   clopidogrel (PLAVIX) 75 MG tablet TAKE 1 TABLET BY MOUTH EVERY DAY  Patient taking differently: Dr. Kacye Patel to stop five days prior 8/31/20  Yes Kalpana Gibson MD   gabapentin (NEURONTIN) 300 MG capsule Take 1 capsule by mouth 2 times daily for 180 days. Intended supply: 30 days 8/31/20 7/8/22 Yes Kalpana Gibson MD   acetaminophen (TYLENOL) 500 MG tablet Take 500 mg by mouth every 6 hours as needed for Pain   Yes Historical Provider, MD   metFORMIN (GLUCOPHAGE) 500 MG tablet Take 500 mg by mouth as needed Pt states she takes as needed/ she goes back and forth with taking one a day \"here and there\"   Yes Historical Provider, MD   amLODIPine (NORVASC) 5 MG tablet Take 5 mg by mouth 2 times daily    Yes Historical Provider, MD   pravastatin (PRAVACHOL) 40 MG tablet Take 40 mg by mouth daily. Yes Historical Provider, MD      Allergies:  Ciprocinonide [fluocinolone]     Review of Systems: all reviewed and refer to HPI   · Constitutional: no unanticipated weight loss. There's been no change in energy level, sleep pattern, or activity level. No fevers, chills. · Eyes: No visual changes or diplopia. No scleral icterus. · ENT: No Headaches, hearing loss or vertigo. No mouth sores or sore throat. · Cardiovascular: No Chest pain, tightness or discomfort.  No Shortness of breath. No Dyspnea on exertion, Orthopnea, Paroxysmal nocturnal dyspnea or breathlessness at rest.   No Palpitations.  No Syncope ('blackouts', 'faints', 'collapse') or dizziness. · Respiratory: No cough or wheezing, no sputum production. No hematemesis. · Gastrointestinal: No abdominal pain, appetite loss, blood in stools.  No change in bowel or bladder habits. · Genitourinary: No dysuria, trouble voiding, or hematuria. · Musculoskeletal:  No gait disturbance, no joint complaints. · Integumentary: No rash or pruritis. · Neurological: No headache, diplopia, change in muscle strength, numbness or tingling. · Psychiatric: No anxiety or depression. · Endocrine: No temperature intolerance. No excessive thirst, fluid intake, or urination. No tremor. · Hematologic/Lymphatic: No abnormal bruising or bleeding, blood clots or swollen lymph nodes. · Allergic/Immunologic: No nasal congestion or hives. · Vascular: BLE edema and pain       Objective:     PHYSICAL EXAM:      Vitals:    07/08/22 1540   BP: 106/72   Pulse: 86   SpO2: 97%   Weight: 185 lb (83.9 kg)   Height: 5' 5\" (1.651 m)      Weight: 185 lb (83.9 kg)       General Appearance:  Alert, cooperative, no distress, appears stated age. Head:  Normocephalic, without obvious abnormality, atraumatic. Eyes:  Pupils equal and round. No scleral icterus. Mouth: Moist mucosa, no pharyngeal erythema. Nose: Nares normal. No drainage or sinus tenderness. Neck: Supple, symmetrical, trachea midline. No adenopathy. No tenderness/mass/nodules. No carotid bruit or elevated JVD. Lungs:   Respiratory Effort: Normal   Auscultation: Clear to auscultation bilaterally, respirations unlabored. No wheeze, rales   Chest Wall:  No tenderness or deformity. Cardiovascular:    Pulses  Palpation: normal   Ascultation: Regular rate, S1/ S2 normal. No murmur, rub, or gallop. 2+ radial and pedal pulses, symmetric  Carotid  Femoral  + doppler PT, DP on right but - none on left   Abdomen and Gastrointestinal:   Soft, non-tender, bowel sounds active. Liver and Spleen  Masses   Musculoskeletal: No muscle wasting  Back  Gait   Extremities: Extremities normal, atraumatic. No cyanosis or edema. No cyanosis clubbing       Skin: Inspection and palpation performed, no rashes or lesions.    Pysch: Normal mood and affect. Alert and oriented to time place person   Neurologic: Normal gross motor and sensory exam.       Labs     All labs have been reviewed    Lab Results   Component Value Date/Time    WBC 7.6 03/28/2022 01:27 PM    RBC 5.12 03/28/2022 01:27 PM    HGB 15.4 03/28/2022 01:27 PM    HCT 46.2 03/28/2022 01:27 PM    MCV 90.2 03/28/2022 01:27 PM    RDW 14.3 03/28/2022 01:27 PM     03/28/2022 01:27 PM     Lab Results   Component Value Date/Time     03/28/2022 01:27 PM    K 4.3 03/28/2022 01:27 PM    K 4.7 12/03/2021 04:17 PM     03/28/2022 01:27 PM    CO2 28 03/28/2022 01:27 PM    BUN 11 03/28/2022 01:27 PM    CREATININE 1.1 03/28/2022 01:27 PM    GFRAA 59 03/28/2022 01:27 PM    GFRAA >60 03/15/2011 12:43 AM    AGRATIO 1.2 12/03/2021 04:17 PM    LABGLOM 49 03/28/2022 01:27 PM    GLUCOSE 107 03/28/2022 01:27 PM    PROT 7.0 12/03/2021 04:17 PM    PROT 6.5 03/15/2011 12:43 AM    CALCIUM 9.7 03/28/2022 01:27 PM    BILITOT 0.3 12/03/2021 04:17 PM    ALKPHOS 96 12/03/2021 04:17 PM    AST 26 12/03/2021 04:17 PM    ALT 16 12/03/2021 04:17 PM     No results found for: PTINR  No results found for: LABA1C  Lab Results   Component Value Date    TROPONINI <0.01 04/15/2014       Cardiac, Vascular and Imaging Data all Personally Reviewed in Detail by Myself      EKG Interpretation: 2/25/2022 Sinus rhythm      7/8/2022 Sinus rhythm     Stress Test: 5/1/14 ([facility])   The LV EF is 81%  All segments are normal. There is a small sized, partially reversible defect in the apical wall. This defect is consistent with breast attenuation. 1.Non-diagnostic ECG for ischemia with pharmocologic stress. 2.Normal left ventriular systolic function. 3.Negative nuclear stress imaging for inducible ischemia. 4.Nuclear stress image findings indicate low risk for future ischemic event . Stress test 9/15/2020   Pharmacological Stress/MPI Results:        1.  Technically a satisfactory study.    2. Normal pharmacological stress portion of the study.    3. No evidence of Ischemia by Myocardial Perfusion Imaging.    4. Gated Study shows normal LV size and Systolic function; EF is 70 %. ECHO 8/24/2021  Suboptimal image quality. Normal left ventricle size, wall thickness, and systolic function with an  estimated ejection fraction of 60-65%. No regional wall motion abnormalities  are seen. Normal right ventricular size and function. Trivial mitral regurgitation. The aortic valve leaflets are not well visualized but appear thickened. No  stenosis based on doppler velocity/gradient. Peripheral angiogram 12/30/14 ([facility])    Renal arteries are widely patent. Infrarenal aorta is ectatic. May have a small aneurysm.  The  common iliac, external and internal iliac arteries are patent. There is some mild disease but no high-grade stenosis noted. Right common femoral, profunda femoral, superficial  Femoral\arteries are patent. Distal SFA is occluded. Reconstitution of the popliteal and the  patient has two-vessel runoff on the right. Left common femoral and profunda femoral arteries  patent.  The patient has a fem below-the-knee pop bypass which appears widely patent.  Multiple views were obtained at both the proximal and distal anastomosis and I am unable to see any significant stenosis. The flow does appear to be slightly sluggish  compared to the right side, however, I do not see any evidence of stenosis. The patient has two vessel runoff to the left lower extremity. Peripheral angiogram 4/28/15 (Dr. Cecil Paz)   Irregular right superficial femoral artery through the majority of its course without hemodynamically significant stenoses. The right superficial femoral artery and proximal above knee popliteal artery were heavily diseased and occluded for a distance of 3 cm at the adductor canal.  The right popliteal artery reconstituted at that level with widely patent below knee  runoff through the posterior tibial and peroneal arteries. Unsuccessful attempt at recanalization and angioplasty of the right  superficial femoral/popliteal artery occlusion. Peripheral angiogram 10/21/19  Abdominal aorta has small aneurysm present. It is tortuous. Patent mesenteric and renals. Left and right common iliac arteries and external iliac arteries are patent. Left common femoral artery and profunda are patent. Left superficial femoral artery is occluded. Left femoropopliteal graft is occluded. The patient has two-vessel run off below the knee. Placement of EKOS tPA catheter after performing mechanical thrombectomy of the left femoropopliteal graft. LE arterial duplex 11/1/19  There is an KIYA of 0.7 on the right.    Elevated velocity of the proximal common femoral and mid femoral arteries    suggests a less than 50% stenosis.    Elevated velocity of the the proximal popliteal artery suggests a 50-70% stenosis     There is an KIYA of 0.9 on the left.    The left femoral to popliteal arterial bypass graft appears patent with    normal waveforms throughout.    The majority of the waveforms are biphasic throughout the left lower extremity      CTA abd aorta w bilat. Runoff:11/12/19  1. Right lower extremity: Findings most compatible with chronic occlusion of   the above the knee popliteal artery with distal reconstitution.  There is   three-vessel runoff to the level of the ankle.  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   2. Left lower extremity: Patent femoral to popliteal bypass graft with   findings most compatible with chronic occlusion of the superficial femoral   artery. [place] is three-vessel runoff to the level of the ankle.    3. Thin fluid collection within the subcutaneous fat of the right groin with   a suggestion of a defined wall, the possibility of an abscess cannot be   entirely excluded, correlate with visual inspection.  This measures at least   6 cm in the transverse dimension but less than 2 cm in thickness.  Please see   body of report for details. 4. Aneurysmal dilatation of the abdominal aorta measuring up to 5 cm in   greatest AP dimension as described in body of report. 5. Indeterminate 1.6 cm left adrenal nodule, this can be further evaluated   via dedicated outpatient adrenal protocol MRI. CT abdomen and pelvis 6/23/2020  Cellulitis of the left inguinal tissues but no abscess.       5.6 cm saccular abdominal aortic aneurysm. Recommend referral to a vascular   specialist.     BLE arterial Doppler 8/24/2021  Right Impression   Right KIYA 0.81. This is consistent with mild PAD at rest.   Elevated velocity of the mid superficial femoral suggests a less than 50%   stenosis. Possible occlusion of the right peroneal artery. Left Impression   Left KIYA 0.58. This is consistent with moderate PAD at rest.   Possible occlusion of the proximal popliteal artery. Occlusion of the left femoral- popliteal arterial bypass graft. Elevated velocity of the proximal superficial femoral artery suggests a less   than 50% stenosis. BLE arterial Doppler 5/31/2022  Impressions   Right Impression   The right ankle/brachial index is . 59 (the DP is 90 and the PT is 100 mmHg). The common femoral artery demonstrates biphasic flow, possibly indicating   aorto-iliac inflow disease. The distal superficial femoral/proximal popliteal artery is occluded. Three vessel runoff is noted to the foot. Left Impression   The left ankle/brachial index is . 96 (the DP is 136 and the PT is 164 mmHg). The common femoral artery demonstrates biphasic flow, possibly indicating   aorto-iliac inflow disease. There is an occluded left fem-pop PTFE graft noted. Elevated velocities at the proximal superficial femoral artery indicate a >50%   stenosis by velocity criteria (velocity went from 88 to 229 cm/s).    Elevated velocities at the mid popliteal artery indicate a >50% stenosis by   velocity criteria (velocity went from 76 to 298 cm/s). Three vessel runoff is noted to the foot. As compared to the previous study of 8/24/21 the right KIYA has dropped from   . 81 down to .59 today and the left KIYA has increased (post balloon   angioplasty) up to . 96 today.           Peripheral angiogram 4/4/2022  ANGIOGRAPHY FINDINGS:  1. Abdominal aorta is patent. 2.  Left common iliac artery 50% stenosis. Right common iliac artery  50% stenosis. 3.  Left superficial femoral artery completely occluded. Profunda  femoris and external iliac arteries are patent. 4.  Left anterior tibial artery and posterior tibial artery are patent.     SUMMARY:  Successful revascularization of left superficial femoral artery. Balloon angioplasty alone with a 6-mm drug-coated balloon. Assessment and Plan     Peripheral Arterial Disease and Claudication  S/p Successful revascularization of left superficial femoral  artery. Balloon angioplasty alone with a 6-mm drug-coated balloon. Intermittent pain. R>L. Continue Xarelto and Plavix. Encouraged regular walking progrmam. She will need intervention to her right leg but will wait until the left has completley healed. Repeat bilateral lower extremity Doppler in November.     -Bilateral lower extremity venous insufficiency continue consrvative management. Abdominal aortic aneurysm   S/p open repair 10/16/2020. Managed by Dr Anna Sneed. Hyperlipidemia, mixed   Continue Pravachol 40 mg daily. Essential Hypertension  Controlled. Continue current medical management. Murmur  Asymptomatic. Nicotine Addiction. Occasionally smokes. I have spent 3-5 minutes on smoking cessation. Diabetes   Managed per PCP    Follow up in 4 months. Thank you for allowing us to participate in the care of Luis Mahajan. Please do not hesitate to contact me if you have any questions.     Juan M Whittington MD, MPH    [facility] [address] [place] [address] [place] [address]  Ph: [phone]  Fax: [phone]    This note was scribed in the presence of Dr Kenneth Espinoza, by Shirin Sky RN  Physician Attestation:  The scribes documentation has been prepared under my direction and personally reviewed by me in its entirety. I confirm that the note above accurately reflects all work, treatment, procedures, and medical decision making performed by me.

## 2022-07-08 ENCOUNTER — OFFICE VISIT (OUTPATIENT)
Dept: CARDIOLOGY CLINIC | Age: 72
End: 2022-07-08

## 2022-07-08 VITALS
HEIGHT: 65 IN | SYSTOLIC BLOOD PRESSURE: 106 MMHG | OXYGEN SATURATION: 97 % | HEART RATE: 86 BPM | DIASTOLIC BLOOD PRESSURE: 72 MMHG | WEIGHT: 185 LBS | BODY MASS INDEX: 30.82 KG/M2

## 2022-07-08 DIAGNOSIS — E78.2 MIXED HYPERLIPIDEMIA: ICD-10-CM

## 2022-07-08 DIAGNOSIS — I73.9 PAD (PERIPHERAL ARTERY DISEASE) (HCC): Primary | ICD-10-CM

## 2022-07-08 DIAGNOSIS — Z72.0 TOBACCO USE: ICD-10-CM

## 2022-07-08 DIAGNOSIS — I10 ESSENTIAL HYPERTENSION: ICD-10-CM

## 2022-07-08 DIAGNOSIS — R01.1 MURMUR: ICD-10-CM

## 2022-07-08 PROCEDURE — 99214 OFFICE O/P EST MOD 30 MIN: CPT | Performed by: INTERNAL MEDICINE

## 2022-07-08 PROCEDURE — 93000 ELECTROCARDIOGRAM COMPLETE: CPT | Performed by: INTERNAL MEDICINE

## 2022-07-08 PROCEDURE — 1123F ACP DISCUSS/DSCN MKR DOCD: CPT | Performed by: INTERNAL MEDICINE

## 2022-07-08 NOTE — PATIENT INSTRUCTIONS
Patient Education        Learning About Peripheral Arterial Disease (PAD)  What is peripheral arterial disease? Peripheral arterial disease (PAD) is narrowing or blockage of arteries thatcauses poor blood flow to your arms and legs. PAD is most common in the legs. The most common cause of PAD is the buildup of plaque on the inside of arteries. Over time, plaque builds up in the walls of the arteries, including those that supply blood to your legs. If you have PAD, you're likely to have plaque in other arteries in your body. This raises your risk of a heart attackand stroke. Medicines and lifestyle changes may lower your risk of heart attack and stroke. They may also help if you have symptoms. In some cases, surgery or othertreatment is needed. Peripheral arterial disease is also called peripheral vascular disease. What are the symptoms? Many people who have PAD don't have symptoms. If you have symptoms, they may include a tight, aching, or squeezing pain in your calf, thigh, or buttock. This pain is called intermittent claudication. It usually happens after you have walked a certain distance. The pain goes away if you stop walking. As PAD gets worse, you may have pain in your foot or toe whenyou aren't walking. Other symptoms may include weak or tired legs. You might have trouble walkingor balancing. If PAD gets worse, you may have other symptoms caused by poor blood flow to your legs and feet. These symptoms aren't common. They may include cold or numb feet or toes, sores that are slow to heal, or leg or foot pain when you're atrest.  How can you prevent PAD?  Quit smoking. Quitting smoking is one of the best things you can do to help prevent PAD. If you need help quitting, talk to your doctor about stop-smoking programs and medicines. These can increase your chances of quitting for good.  Stay at a healthy weight.    Manage other health problems, including diabetes, high blood pressure, and high cholesterol.  Be physically active. Try to do moderate activity at least 2½ hours a week. Or try to do vigorous activity at least 1¼ hours a week. You may want to walk or try other activities, such as running, swimming, cycling, or playing tennis or team sports.  Eat a variety of heart-healthy foods. ? Eat fruits, vegetables, whole grains, beans, and other high-fiber foods. ? Eat lean proteins, such as seafood, lean meats, beans, nuts, and soy products. ? Eat healthy fats, such as canola and olive oil. ? Choose foods that are low in saturated fat and avoid trans fat. ? Limit sodium and alcohol. ? Limit drinks and foods with added sugar. How is PAD treated? Treatment for PAD focuses on relieving symptoms and lowering your risk of heart attack and stroke. Making healthy lifestyle changes can help you lower thisrisk.  If you smoke, quit. Quitting is the best thing you can do when you have PAD. Medicines and counseling can help you quit for good.  Get regular exercise (if your doctor says it's safe). Try walking, swimming, or biking for at least 30 minutes on most, if not all, days of the week. If you have leg symptoms when you exercise, your doctor might recommend a specialized exercise program that may relieve symptoms. The goal is to be able to walk farther without pain.  Eat heart-healthy foods, such as vegetables, fruits, nuts, beans, fish, and whole grains. Limit foods that have a lot of salt, fat, and sugar.  Stay at a healthy weight. Lose weight if you need to. You may need medicines to help lower your risk of heart attack and stroke. These include medicine to prevent blood clots, improve cholesterol, or lower blood pressure. You also may take a medicine that can help ease pain while youare walking. People who have severe PAD may have bypass surgery or other procedures (such asangioplasty) to restore proper blood flow to the legs. Follow-up care is a key part of your treatment and safety.

## 2022-07-25 RX ORDER — RIVAROXABAN 2.5 MG/1
TABLET, FILM COATED ORAL
Qty: 60 TABLET | Refills: 3 | Status: SHIPPED | OUTPATIENT
Start: 2022-07-25

## 2022-08-12 ENCOUNTER — HOSPITAL ENCOUNTER (OUTPATIENT)
Dept: PULMONOLOGY | Age: 72
Discharge: HOME | End: 2022-08-12
Payer: COMMERCIAL

## 2022-08-12 DIAGNOSIS — R09.02 HYPOXEMIA: ICD-10-CM

## 2022-08-12 DIAGNOSIS — J44.9 CHRONIC OBSTRUCTIVE PULMONARY DISEASE, UNSPECIFIED COPD TYPE (HCC): ICD-10-CM

## 2022-08-12 LAB
DLCO %PRED: 57 %
DLCO PRED: NORMAL
DLCO/VA %PRED: 77 %
DLCO/VA PRED: NORMAL
DLCO/VA: 3.78 ML/MIN/MMHG
DLCO: NORMAL
EXPIRATORY TIME: NORMAL
FEF 25-75% %PRED-PRE: NORMAL
FEF 25-75% PRED: NORMAL
FEF 25-75%-PRE: NORMAL
FEV1 %PRED-PRE: 68 %
FEV1 PRED: NORMAL
FEV1/FVC %PRED-PRE: NORMAL
FEV1/FVC PRED: NORMAL
FEV1/FVC: 60 %
FEV1: NORMAL
FVC %PRED-PRE: NORMAL
FVC PRED: NORMAL
FVC: NORMAL
GAW %PRED: NORMAL
GAW PRED: NORMAL
GAW: NORMAL
IC %PRED: NORMAL
IC PRED: NORMAL
IC: NORMAL
MVV %PRED-PRE: NORMAL
MVV PRED: NORMAL
MVV-PRE: NORMAL
PEF %PRED-PRE: NORMAL
PEF PRED: NORMAL
PEF-PRE: NORMAL
RAW %PRED: NORMAL
RAW PRED: NORMAL
RAW: NORMAL
RV %PRED: NORMAL
RV PRED: NORMAL
RV: NORMAL
SVC %PRED: NORMAL
SVC PRED: NORMAL
SVC: NORMAL
TLC %PRED: 89 %
TLC PRED: NORMAL
TLC: NORMAL
VA %PRED: 74 %
VA PRED: NORMAL
VA: 3.84 L
VTG %PRED: NORMAL
VTG PRED: NORMAL
VTG: NORMAL

## 2022-08-12 PROCEDURE — 94060 EVALUATION OF WHEEZING: CPT

## 2022-08-12 PROCEDURE — 94726 PLETHYSMOGRAPHY LUNG VOLUMES: CPT

## 2022-08-12 PROCEDURE — 94729 DIFFUSING CAPACITY: CPT

## 2022-08-12 PROCEDURE — 94640 AIRWAY INHALATION TREATMENT: CPT

## 2022-08-12 PROCEDURE — 94010 BREATHING CAPACITY TEST: CPT

## 2022-08-12 RX ORDER — ALBUTEROL SULFATE 90 UG/1
4 AEROSOL, METERED RESPIRATORY (INHALATION) ONCE
Status: DISCONTINUED | OUTPATIENT
Start: 2022-08-12 | End: 2022-08-12

## 2022-08-12 ASSESSMENT — PULMONARY FUNCTION TESTS
FEV1_PERCENT_PREDICTED_PRE: 68
FEV1/FVC: 60

## 2022-08-14 PROCEDURE — 94726 PLETHYSMOGRAPHY LUNG VOLUMES: CPT | Performed by: INTERNAL MEDICINE

## 2022-08-14 PROCEDURE — 94060 EVALUATION OF WHEEZING: CPT | Performed by: INTERNAL MEDICINE

## 2022-08-14 PROCEDURE — 94729 DIFFUSING CAPACITY: CPT | Performed by: INTERNAL MEDICINE

## 2022-08-14 NOTE — PROCEDURES
spirometry was not acceptable and reproducible by ATS standards      Spirometry/Flow volume loop: Moderate airflow obstruction     Lung volumes:   Normal     Diffusing capacity:   Mildly reduced but patient was not able to meet 90% VC requirement     Impression:   There is moderate air flow obstruction, no bronchodilator response done     TLC %Pred   Date Value Ref Range Status   08/12/2022 89 % Final     DLCO/VA %Pred   Date Value Ref Range Status   08/12/2022 77 % Final           OBSTRUCTION % Predicted FEV1   MILD >70%   MODERATE 60-69%   MODERATELY-SEVERE 50-59%   SEVERE 35-49%   VERY SEVERE <35%         RESTRICTION % Predicted TLC   MILD 66-80%   MODERATE 54-65%   MODERATELY-SEVERE <54%                 DIFFUSION CAPACITY DLCO % Pred   MILD >60% AND < LLN   MODERATE 40-60%   SEVERE <40%       PFT data will be scanned into the media tab under this encounter. Please see the scanned data for numerical values.      Saritha Jordan MD  [facility] Pulmonary, Sleep and Critical Care Medicine

## 2022-10-11 ENCOUNTER — OFFICE VISIT (OUTPATIENT)
Dept: PULMONOLOGY | Age: 72
End: 2022-10-11
Payer: COMMERCIAL

## 2022-10-11 VITALS
SYSTOLIC BLOOD PRESSURE: 130 MMHG | HEIGHT: 65 IN | WEIGHT: 192 LBS | HEART RATE: 90 BPM | DIASTOLIC BLOOD PRESSURE: 80 MMHG | RESPIRATION RATE: 21 BRPM | TEMPERATURE: 96.8 F | BODY MASS INDEX: 31.99 KG/M2 | OXYGEN SATURATION: 98 %

## 2022-10-11 DIAGNOSIS — J44.9 COPD, MODERATE (HCC): ICD-10-CM

## 2022-10-11 DIAGNOSIS — Z72.0 TOBACCO USE: ICD-10-CM

## 2022-10-11 DIAGNOSIS — Z87.891 PERSONAL HISTORY OF TOBACCO USE: Primary | ICD-10-CM

## 2022-10-11 DIAGNOSIS — E66.9 OBESITY (BMI 30-39.9): ICD-10-CM

## 2022-10-11 PROCEDURE — 1123F ACP DISCUSS/DSCN MKR DOCD: CPT | Performed by: INTERNAL MEDICINE

## 2022-10-11 PROCEDURE — G0296 VISIT TO DETERM LDCT ELIG: HCPCS | Performed by: INTERNAL MEDICINE

## 2022-10-11 PROCEDURE — 99204 OFFICE O/P NEW MOD 45 MIN: CPT | Performed by: INTERNAL MEDICINE

## 2022-10-11 RX ORDER — BUDESONIDE, GLYCOPYRROLATE, AND FORMOTEROL FUMARATE 160; 9; 4.8 UG/1; UG/1; UG/1
AEROSOL, METERED RESPIRATORY (INHALATION)
COMMUNITY

## 2022-10-11 RX ORDER — IPRATROPIUM BROMIDE AND ALBUTEROL SULFATE 2.5; .5 MG/3ML; MG/3ML
1 SOLUTION RESPIRATORY (INHALATION) EVERY 4 HOURS
COMMUNITY

## 2022-10-11 ASSESSMENT — COPD QUESTIONNAIRES
QUESTION5_HOMEACTIVITIES: 3
QUESTION3_CHESTTIGHTNESS: 2
QUESTION2_CHESTPHLEGM: 3
QUESTION4_WALKINCLINE: 1
QUESTION1_COUGHFREQUENCY: 4
QUESTION8_ENERGYLEVEL: 4
QUESTION7_SLEEPQUALITY: 3
CAT_TOTALSCORE: 25
QUESTION6_LEAVINGHOUSE: 5

## 2022-10-11 NOTE — PATIENT INSTRUCTIONS
Finish Breztri with spacer. Call [facility] about the cost     Nebulized budesonide , Yupelri       Please call [phone] to schedule test CT chest,        What is lung cancer screening? Lung cancer screening is a way in which doctors check the lungs for early signs of cancer in people who have no symptoms of lung cancer. A low-dose CT scan uses much less radiation than a normal CT scan and shows a more detailed image of the lungs than a standard X-ray. The goal of lung cancer screening is to find cancer early, before it has a chance to grow, spread, or cause problems. One large study found that smokers who were screened with low-dose CT scans were less likely to die of lung cancer than those who were screened with standard X-ray. Below is a summary of the things you need to know regarding screening for lung cancer with low-dose computed tomography (LDCT). This is a screening program that involves routine annual screening with LDCT studies of the lung. The LDCTs are done using low-dose radiation that is not thought to increase your cancer risk. If you have other serious medical conditions (other cancers, congestive heart failure) that limit your life expectancy to less than 10 years, you should not undergo lung cancer screening with LDCT. The chance is 20%-60% that the LDCT result will show abnormalities. This would require additional testing which could include repeat imaging or even invasive procedures. Most (about 95%) of \"abnormal\" LDCT results are false in the sense that no lung cancer is ultimately found. Additionally, some (about 10%) of the cancers found would not affect your life expectancy, even if undetected and untreated. If you are still smoking, the single most important thing that you can do to reduce your risk of dying of lung cancer is to quit. For this screening to be covered by Medicare and most other insurers, strict criteria must be met.   If you do not meet these criteria, but still wish to undergo LDCT testing, you will be required to sign a waiver indicating your willingness to pay for the scan.

## 2022-10-11 NOTE — ASSESSMENT & PLAN NOTE
Continues to smoke but has weaned. Smoke cessation advised    Lung cancer screening CAT scan ordered.

## 2022-10-11 NOTE — ASSESSMENT & PLAN NOTE
Uncontrolled. Having a lot of symptoms of shortness of breath with cough. She was on Trelegy and changed to [facility]. HFA technique assessed. It is clear that she is not getting any medications from her [facility] secondary to poor technique. We reviewed how to use of Breztri. Unfortunately, she had difficulty with timing. We would then reviewed how to use medication with a spacer. I believe this will be effective. Order for spacer sent    We also discussed cost.  Breztri and Trelegy will cost $37 until she hits the donut but then increases to $180. She is not able to afford this. She is currently sample dependent. I advised her to get a spacer and use the rest of her Breztri. Further, advised her to call her insurance company to see whether Yupelri and nebulized budesonide would be cheaper than HFA.

## 2022-10-11 NOTE — PROGRESS NOTES
REASON FOR CONSULTATION/CC:    Chief Complaint   Patient presents with    Establish Care    COPD        Consult at request of   Param Camacho MD for      PCP: Param Camacho MD    HISTORY OF PRESENT ILLNESS: Rusty Segura is a 74y.o. year old female with a history of          COPD   She completed pft with continued symptoms of cough and dyspnea. Dyspnea / dyspnea on exertion  with wheezing     Has Breztri. This is $37 per month now $180 and not affordable. She is using but not clear she is using it correc      symptoms are intermittent. Stress incontinence. Tobacco abuse  Down to 2-3 cigarettes per day. Down form 1 ppd    REVIEW OF SYSTEMS:  Constitutional: Negative for fever    HENT: Negative for sore throat  Eyes: Negative for redness   Respiratory: Negative for dyspnea, cough  Cardiovascular: Negative for chest pain  Gastrointestinal: Negative for vomiting, diarrhea   Genitourinary: Negative for hematuria   Musculoskeletal: Negative for arthralgias   Skin: Negative for rash  Neurological: Negative for syncope  Hematological: Negative for adenopathy  Psychiatric/Behavorial: Negative for anxiety      SOCIAL HISTORY:   reports that she has been smoking cigarettes. She has a 22.50 pack-year smoking history.  She has never used smokeless tobacco.    PAST MEDICAL HISTORY:  Past Medical History:   Diagnosis Date    Arthritis     osteoarthritis    Back pain     CAD (coronary artery disease)     Cellulitis     COPD (chronic obstructive pulmonary disease) ([address]     pt unsure    Depression     Diabetes mellitus ([address]     Enlarging abdominal aortic aneurysm (AAA)     History of femoral angiogram 4/30/2015    Hyperlipidemia     Hypertension     PVD (peripheral vascular disease) ([address]        PAST SURGICAL HISTORY:  Past Surgical History:   Procedure Laterality Date    ABDOMINAL AORTIC ANEURYSM REPAIR N/A 10/16/2020    OPEN REPAIR ABDOMINAL AORTIC ANEURYSM performed by Karen Ramírez MD at [place] [address] ABSCESS DRAINAGE Right 2/19/16    I and D right gluteal abscess    CARDIAC CATHETERIZATION      CHOLECYSTECTOMY      COLONOSCOPY N/A 2/7/2022    COLONOSCOPY POLYPECTOMY SNARE/COLD BIOPSY performed by Gisell Hooper MD at [address] Left 9-    excisional debridement 3.5 cm x 5 cm abscess    VASCULAR SURGERY Left     fem-tib       FAMILY HISTORY:  family history includes Heart Surgery in her father. Objective:   PHYSICAL EXAM:  Blood pressure 130/80, pulse 90, temperature 96.8 °F (36 °C), resp. rate 21, height 5' 5\" (1.651 m), weight 192 lb (87.1 kg), SpO2 98 %, not currently breastfeeding.'      Gen: No distress. Eyes: PERRL. No sclera icterus. No conjunctival injection. ENT: No discharge. Pharynx clear. External appearance of ears and nose normal.  Neck: Trachea midline. No obvious mass. Resp: No accessory muscle use. No crackles. No wheezes. No rhonchi. CV: Regular rate. Regular rhythm. No murmur or rub. No edema. GI:    Skin: Warm, dry, normal texture and turgor. No nodule on exposed extremities. Lymph: No cervical LAD. No supraclavicular LAD. M/S: No cyanosis. No clubbing. No joint deformity. Neuro: Moves all four extremities. Psych: Oriented x 3. No anxiety. Awake. Alert. Intact judgement and insight.     Current Outpatient Medications   Medication Sig Dispense Refill    Budeson-Glycopyrrol-Formoterol (BREZTRI AEROSPHERE) 160-9-4.8 MCG/ACT AERO Inhale into the lungs 2 puffs BID AM and PM      albuterol sulfate (PROAIR RESPICLICK) 369 (90 Base) MCG/ACT aerosol powder inhalation Inhale 1 puff into the lungs every 4 hours as needed for Wheezing or Shortness of Breath      ipratropium-albuterol (DUONEB) 0.5-2.5 (3) MG/3ML SOLN nebulizer solution Inhale 1 vial into the lungs every 4 hours      Spacer/Aero-Holding Chambers JACY 1 Device by Does not apply route daily 1 each 0    XARELTO 2.5 MG TABS tablet TAKE 1 TABLET BY MOUTH TWICE A DAY 60 tablet 3    clopidogrel (PLAVIX) 75 MG tablet TAKE 1 TABLET BY MOUTH EVERY DAY (Patient taking differently: Dr. Posey Arts to stop five days prior) 30 tablet 3    acetaminophen (TYLENOL) 500 MG tablet Take 500 mg by mouth every 6 hours as needed for Pain      metFORMIN (GLUCOPHAGE) 500 MG tablet Take 500 mg by mouth as needed Pt states she takes as needed/ she goes back and forth with taking one a day \"here and there\"      amLODIPine (NORVASC) 5 MG tablet Take 5 mg by mouth 2 times daily       pravastatin (PRAVACHOL) 40 MG tablet Take 40 mg by mouth daily. gabapentin (NEURONTIN) 300 MG capsule Take 1 capsule by mouth 2 times daily for 180 days. Intended supply: 30 days 180 capsule 0     No current facility-administered medications for this visit. Data Reviewed:     Category 1 Data points:      Last CBC  Lab Results   Component Value Date/Time    WBC 7.6 03/28/2022 01:27 PM    RBC 5.12 03/28/2022 01:27 PM    HGB 15.4 03/28/2022 01:27 PM    MCV 90.2 03/28/2022 01:27 PM     03/28/2022 01:27 PM     Last Renal  Lab Results   Component Value Date/Time     03/28/2022 01:27 PM    K 4.3 03/28/2022 01:27 PM    K 4.7 12/03/2021 04:17 PM     03/28/2022 01:27 PM    CO2 28 03/28/2022 01:27 PM    CO2 21 12/03/2021 04:17 PM    CO2 30 10/22/2020 05:55 AM    BUN 11 03/28/2022 01:27 PM    CREATININE 1.1 03/28/2022 01:27 PM    GLUCOSE 107 03/28/2022 01:27 PM    CALCIUM 9.7 03/28/2022 01:27 PM       Last ABG  POC Blood Gas: No results found for: POCPH, POCPCO2, POCPO2, POCHCO3, NBEA, QQNE1GUS  No results for input(s): PH, PCO2, PO2, HCO3, BE, O2SAT in the last 72 hours. Notes Reviewed:       Radiology Review:  Pertinent images / reports were reviewed as a part of this visit. CT Chest w/ contrast: No results found for this or any previous visit. CT Chest w/o contrast: No results found for this or any previous visit.       CTPA: No results found for this or any previous visit.      CXR PA/LAT: Results for orders placed during the hospital encounter of 07/13/21    XR CHEST (2 VW)    Narrative  EXAMINATION:  TWO XRAY VIEWS OF THE CHEST    7/13/2021 10:42 am    COMPARISON:  October 22, 2020    HISTORY:  ORDERING SYSTEM PROVIDED HISTORY: Bronchitis, chronic, simple ([address]  TECHNOLOGIST PROVIDED HISTORY:  Reason for Exam: bronchitis  Acuity: Acute  Type of Exam: Initial    FINDINGS:  No evidence of pneumonia, edema or other acute pulmonary process. No evidence  of acute process of the cardiac or mediastinal structures. No evidence of  pneumothorax or pleural effusion. Impression  No evidence of acute cardiopulmonary disease. CXR portable: Results for orders placed during the hospital encounter of 10/16/20    XR CHEST PORTABLE    Narrative  EXAMINATION:  ONE XRAY VIEW OF THE CHEST    10/22/2020 4:21 am    COMPARISON:  10/19/2020    HISTORY:  ORDERING SYSTEM PROVIDED HISTORY: respiratory failure  TECHNOLOGIST PROVIDED HISTORY:  Reason for exam:->respiratory failure  Reason for Exam: respiratory failure  Acuity: Acute  Type of Exam: Initial    FINDINGS:  Mediastinal silhouette appears stable. Vascular sheath has been removed  since prior study. Right lung base demonstrates improved aeration. The left lung base  demonstrates no significant change with regard to basilar airspace disease  and pleural effusion. No acute bony abnormality appreciated. Impression  Interval removal of vascular sheath. Improved aeration at the right lung base with persistent moderate left  basilar consolidation and left-sided pleural effusion. The appearance is  nonspecific. Primary considerations include atelectasis, pneumonia and  pulmonary edema. Continued radiographic follow-up recommended.           Total labs reviewed      Category 2 Data points:    Radiology Review:  Independent interpretation of      Category 3 Data points:    Discussed management or interpretation of test with external provider:              Assessment:        COPD FEV1 68% DLCO 57%  Tobacco abuse  Obesity Body mass index is 31.95 kg/m². Plan:      Problem List Items Addressed This Visit       Obesity (BMI 30-39. 9)     Uncontrolled. Tobacco use     Continues to smoke but has weaned. Smoke cessation advised    Lung cancer screening CAT scan ordered. COPD, moderate ([address]     Uncontrolled. Having a lot of symptoms of shortness of breath with cough. She was on Trelegy and changed to WatchGuard. HFA technique assessed. It is clear that she is not getting any medications from her WatchGuard secondary to poor technique. We reviewed how to use of Breztri. Unfortunately, she had difficulty with timing. We would then reviewed how to use medication with a spacer. I believe this will be effective. Order for spacer sent    We also discussed cost.  Breztri and Trelegy will cost $37 until she hits the donut but then increases to $180. She is not able to afford this. She is currently sample dependent. I advised her to get a spacer and use the rest of her Breztri. Further, advised her to call her insurance company to see whether Yupelri and nebulized budesonide would be cheaper than HFA. Relevant Medications    Budeson-Glycopyrrol-Formoterol (BREZTRI AEROSPHERE) 160-9-4.8 MCG/ACT AERO    albuterol sulfate (PROAIR RESPICLICK) 193 (90 Base) MCG/ACT aerosol powder inhalation    ipratropium-albuterol (DUONEB) 0.5-2.5 (3) MG/3ML SOLN nebulizer solution    Spacer/Aero-Holding Yani Shields     Other Visit Diagnoses       Personal history of tobacco use    -  Primary    Relevant Orders    OH VISIT TO DISCUSS LUNG CA SCREEN W LDCT (Completed)    CT Lung Screen (Annual)                  This note was transcribed using [id number] [employer]. Please disregard any translational errors.     [address] [facility]  [phone]     Low Dose CT (LDCT) Lung Screening criteria met:     Age 50-77(Medicare) or 50-80 ([facility])   Pack year smoking >20   Still smoking or less than 15 year since quit   No sign or symptoms of lung cancer   > 11 months since last LDCT     Risks and benefits of lung cancer screening with LDCT scans discussed:    Significance of positive screen - False-positive LDCT results often occur. 95% of all positive results do not lead to a diagnosis of cancer. Usually further imaging can resolve most false-positive results; however, some patients may require invasive procedures. Over diagnosis risk - 10% to 12% of screen-detected lung cancer cases are over diagnosed--that is, the cancer would not have been detected in the patient's lifetime without the screening. Need for follow up screens annually to continue lung cancer screening effectiveness     Risks associated with radiation from annual LDCT- Radiation exposure is about the same as for a mammogram, which is about 1/3 of the annual background radiation exposure from everyday life. Starting screening at age 54 is not likely to increase cancer risk from radiation exposure. Patients with comorbidities resulting in life expectancy of < 10 years, or that would preclude treatment of an abnormality identified on CT, should not be screened due to lack of benefit.     To obtain maximal benefit from this screening, smoking cessation and long-term abstinence from smoking is critical

## 2022-10-21 ENCOUNTER — HOSPITAL ENCOUNTER (OUTPATIENT)
Dept: VASCULAR LAB | Age: 72
Discharge: HOME | End: 2022-10-21
Payer: COMMERCIAL

## 2022-10-21 DIAGNOSIS — I73.9 PAD (PERIPHERAL ARTERY DISEASE) (HCC): ICD-10-CM

## 2022-10-21 PROCEDURE — 93925 LOWER EXTREMITY STUDY: CPT

## 2022-10-27 ENCOUNTER — TELEPHONE (OUTPATIENT)
Dept: CARDIOLOGY CLINIC | Age: 72
End: 2022-10-27

## 2022-10-27 ENCOUNTER — OFFICE VISIT (OUTPATIENT)
Dept: CARDIOLOGY CLINIC | Age: 72
End: 2022-10-27
Payer: COMMERCIAL

## 2022-10-27 VITALS
HEART RATE: 87 BPM | DIASTOLIC BLOOD PRESSURE: 78 MMHG | SYSTOLIC BLOOD PRESSURE: 120 MMHG | HEIGHT: 65 IN | BODY MASS INDEX: 31.65 KG/M2 | OXYGEN SATURATION: 97 % | WEIGHT: 190 LBS

## 2022-10-27 DIAGNOSIS — I73.9 PAD (PERIPHERAL ARTERY DISEASE) (HCC): Primary | ICD-10-CM

## 2022-10-27 PROCEDURE — 3074F SYST BP LT 130 MM HG: CPT | Performed by: INTERNAL MEDICINE

## 2022-10-27 PROCEDURE — 3078F DIAST BP <80 MM HG: CPT | Performed by: INTERNAL MEDICINE

## 2022-10-27 PROCEDURE — 1123F ACP DISCUSS/DSCN MKR DOCD: CPT | Performed by: INTERNAL MEDICINE

## 2022-10-27 PROCEDURE — 99215 OFFICE O/P EST HI 40 MIN: CPT | Performed by: INTERNAL MEDICINE

## 2022-10-27 NOTE — PATIENT INSTRUCTIONS
The morning of your procedure you will park in the hospital parking lot and report directly to the cath lab to check in.      Pre-Procedure Instructions   You will need to fast for at least 8 hours prior to procedure. No caffeine the morning of. You will need to hold your anticoagulation, Xarelto for 2 days prior. All other medications can be taken in the morning with sips of water. Do not use any lotions, creams or perfume the morning of procedure. Pre-procedure lab work will need to be completed 5-7 days prior to procedure. Please have a responsible adult to drive you home after procedure. We advise you have someone to stay with you for 24 hours following procedure for precautionary measures. Depending on procedure you may require an overnight stay. Cath lab will provide you with all post procedure instructions. If you have any questions regarding the procedure itself or medications, please call [phone] and ask to speak with a nurse.

## 2022-10-27 NOTE — PROGRESS NOTES
[facility]  Cardiology Progress Note    Carisa Contreras  1950 October 27, 2022      Reason for Referral: PAD     CC: \"I am having calf pain. \"     Subjective:     History of Present Illness:    Carisa Contreras is a 74 y.o. patient with a PMH significant for PAD with known right SFA occlusion hyperlipidemia, hypertension and type 2 diabetes who presents today for management of PAD. She was admitted to [facility] with worsening claudication and underwent a peripheral angiogram. She had EKOS and thrombectomy of left femoropopliteal graft. Today, she is here for follow up. She has been having calf pain. She knows that she needs to walk more. She did have injection in her left knee. Patient denies exertional chest pain/pressure, dyspnea at rest, PIMENTEL, PND, orthopnea, palpitations, lightheadedness, weight changes, changes in LE edema, and syncope. Patient reports compliance to her medications. Past Medical History:   has a past medical history of Arthritis, Back pain, CAD (coronary artery disease), Cellulitis, COPD (chronic obstructive pulmonary disease) ([address] Depression, Diabetes mellitus ([address] Enlarging abdominal aortic aneurysm (AAA), History of femoral angiogram, Hyperlipidemia, Hypertension, and PVD (peripheral vascular disease) ([address] Surgical History:   has a past surgical history that includes Cholecystectomy; hernia repair; other surgical history (Left, 9-); vascular surgery (Left); Abscess Drainage (Right, 2/19/16); Cardiac catheterization; Abdominal aortic aneurysm repair (N/A, 10/16/2020); and Colonoscopy (N/A, 2/7/2022). Social History:   reports that she has been smoking cigarettes. She has a 22.50 pack-year smoking history. She has never used smokeless tobacco. She reports current alcohol use of about 3.8 standard drinks per week. She reports that she does not use drugs. Family History:  family history includes Heart Surgery in her father.     Home Medications:  Were reviewed and are listed in nursing record and/or below  Prior to Admission medications    Medication Sig Start Date End Date Taking? Authorizing Provider   Budeson-Glycopyrrol-Formoterol (BREZTRI AEROSPHERE) 160-9-4.8 MCG/ACT AERO Inhale into the lungs 2 puffs BID AM and PM   Yes Historical Provider, MD   albuterol sulfate (PROAIR RESPICLICK) 577 (90 Base) MCG/ACT aerosol powder inhalation Inhale 1 puff into the lungs every 4 hours as needed for Wheezing or Shortness of Breath   Yes Historical Provider, MD   ipratropium-albuterol (DUONEB) 0.5-2.5 (3) MG/3ML SOLN nebulizer solution Inhale 1 vial into the lungs every 4 hours   Yes Historical Provider, MD   Spacer/Aero-Holding Chambers JACY 1 Device by Does not apply route daily 10/11/22  Yes Pete Colin MD   XARELTO 2.5 MG TABS tablet TAKE 1 TABLET BY MOUTH TWICE A DAY 7/25/22  Yes Estefani Abernathy MD   clopidogrel (PLAVIX) 75 MG tablet TAKE 1 TABLET BY MOUTH EVERY DAY  Patient taking differently: Dr. Ludwin Carmichael to stop five days prior 8/31/20  Yes Estefani Abernathy MD   acetaminophen (TYLENOL) 500 MG tablet Take 500 mg by mouth every 6 hours as needed for Pain   Yes Historical Provider, MD   metFORMIN (GLUCOPHAGE) 500 MG tablet Take 500 mg by mouth as needed Pt states she takes as needed/ she goes back and forth with taking one a day \"here and there\"   Yes Historical Provider, MD   amLODIPine (NORVASC) 5 MG tablet Take 5 mg by mouth 2 times daily    Yes Historical Provider, MD   pravastatin (PRAVACHOL) 40 MG tablet Take 40 mg by mouth daily. Yes Historical Provider, MD   gabapentin (NEURONTIN) 300 MG capsule Take 1 capsule by mouth 2 times daily for 180 days. Intended supply: 30 days 8/31/20 7/8/22  Estefani Abernathy MD      Allergies:  Ciprocinonide [fluocinolone]     Review of Systems: all reviewed and refer to HPI   Constitutional: no unanticipated weight loss. There's been no change in energy level, sleep pattern, or activity level. No fevers, chills.    Eyes: No visual changes or diplopia. No scleral icterus. ENT: No Headaches, hearing loss or vertigo. No mouth sores or sore throat. Cardiovascular: No Chest pain, tightness or discomfort. No Shortness of breath. No Dyspnea on exertion, Orthopnea, Paroxysmal nocturnal dyspnea or breathlessness at rest.  No Palpitations. No Syncope ('blackouts', 'faints', 'collapse') or dizziness. Respiratory: No cough or wheezing, no sputum production. No hematemesis. Gastrointestinal: No abdominal pain, appetite loss, blood in stools. No change in bowel or bladder habits. Genitourinary: No dysuria, trouble voiding, or hematuria. Musculoskeletal:  No gait disturbance, no joint complaints. Integumentary: No rash or pruritis. Neurological: No headache, diplopia, change in muscle strength, numbness or tingling. Psychiatric: No anxiety or depression. Endocrine: No temperature intolerance. No excessive thirst, fluid intake, or urination. No tremor. Hematologic/Lymphatic: No abnormal bruising or bleeding, blood clots or swollen lymph nodes. Allergic/Immunologic: No nasal congestion or hives. Vascular: BLE edema and pain       Objective:     PHYSICAL EXAM:      Vitals:    10/27/22 1033   BP: 120/78   Site: Left Upper Arm   Position: Sitting   Pulse: 87   SpO2: 97%   Weight: 190 lb (86.2 kg)   Height: 5' 5\" (1.651 m)        Weight: 190 lb (86.2 kg)       General Appearance:  Alert, cooperative, no distress, appears stated age. Head:  Normocephalic, without obvious abnormality, atraumatic. Eyes:  Pupils equal and round. No scleral icterus. Mouth: Moist mucosa, no pharyngeal erythema. Nose: Nares normal. No drainage or sinus tenderness. Neck: Supple, symmetrical, trachea midline. No adenopathy. No tenderness/mass/nodules. No carotid bruit or elevated JVD. Lungs:   Respiratory Effort: Normal   Auscultation: Clear to auscultation bilaterally, respirations unlabored. No wheeze, rales   Chest Wall:  No tenderness or deformity. Cardiovascular:    Pulses  Palpation: normal   Ascultation: Regular rate, S1/ S2 normal. No murmur, rub, or gallop. 2+ radial and pedal pulses, symmetric  Carotid  Femoral  + doppler PT, DP on right but - none on left   Abdomen and Gastrointestinal:   Soft, non-tender, bowel sounds active. Liver and Spleen  Masses   Musculoskeletal: No muscle wasting  Back  Gait   Extremities: Extremities normal, atraumatic. No cyanosis or edema. No cyanosis clubbing       Skin: Inspection and palpation performed, no rashes or lesions. Pysch: Normal mood and affect.  Alert and oriented to time place person   Neurologic: Normal gross motor and sensory exam.       Labs     All labs have been reviewed    Lab Results   Component Value Date/Time    WBC 7.6 03/28/2022 01:27 PM    RBC 5.12 03/28/2022 01:27 PM    HGB 15.4 03/28/2022 01:27 PM    HCT 46.2 03/28/2022 01:27 PM    MCV 90.2 03/28/2022 01:27 PM    RDW 14.3 03/28/2022 01:27 PM     03/28/2022 01:27 PM     Lab Results   Component Value Date/Time     03/28/2022 01:27 PM    K 4.3 03/28/2022 01:27 PM    K 4.7 12/03/2021 04:17 PM     03/28/2022 01:27 PM    CO2 28 03/28/2022 01:27 PM    BUN 11 03/28/2022 01:27 PM    CREATININE 1.1 03/28/2022 01:27 PM    GFRAA 59 03/28/2022 01:27 PM    GFRAA >60 03/15/2011 12:43 AM    AGRATIO 1.2 12/03/2021 04:17 PM    LABGLOM 49 03/28/2022 01:27 PM    GLUCOSE 107 03/28/2022 01:27 PM    PROT 7.0 12/03/2021 04:17 PM    PROT 6.5 03/15/2011 12:43 AM    CALCIUM 9.7 03/28/2022 01:27 PM    BILITOT 0.3 12/03/2021 04:17 PM    ALKPHOS 96 12/03/2021 04:17 PM    AST 26 12/03/2021 04:17 PM    ALT 16 12/03/2021 04:17 PM     No results found for: PTINR  No results found for: LABA1C  Lab Results   Component Value Date    TROPONINI <0.01 04/15/2014       Cardiac, Vascular and Imaging Data all Personally Reviewed in Detail by Myself      EKG Interpretation: 2/25/2022 Sinus rhythm      7/8/2022 Sinus rhythm     Stress Test: 5/1/14 ([facility])   The LV EF is 81%  All segments are normal. There is a small sized, partially reversible defect in the apical wall. This defect is consistent with breast attenuation. 1.Non-diagnostic ECG for ischemia with pharmocologic stress. 2.Normal left ventriular systolic function. 3.Negative nuclear stress imaging for inducible ischemia. 4.Nuclear stress image findings indicate low risk for future ischemic event . Stress test 9/15/2020   Pharmacological Stress/MPI Results:        1. Technically a satisfactory study. 2. Normal pharmacological stress portion of the study. 3. No evidence of Ischemia by Myocardial Perfusion Imaging. 4. Gated Study shows normal LV size and Systolic function; EF is 70 %. ECHO 8/24/2021  Suboptimal image quality. Normal left ventricle size, wall thickness, and systolic function with an  estimated ejection fraction of 60-65%. No regional wall motion abnormalities  are seen. Normal right ventricular size and function. Trivial mitral regurgitation. The aortic valve leaflets are not well visualized but appear thickened. No  stenosis based on doppler velocity/gradient. Peripheral angiogram 12/30/14 ([facility])    Renal arteries are widely patent. Infrarenal aorta is ectatic. May have a small aneurysm. The  common iliac, external and internal iliac arteries are patent. There is some mild disease but no high-grade stenosis noted. Right common femoral, profunda femoral, superficial  Femoral\arteries are patent. Distal SFA is occluded. Reconstitution of the popliteal and the  patient has two-vessel runoff on the right. Left common femoral and profunda femoral arteries  patent. The patient has a fem below-the-knee pop bypass which appears widely patent. Multiple views were obtained at both the proximal and distal anastomosis and I am unable to see any significant stenosis.  The flow does appear to be slightly sluggish  compared to the right side, however, I do not see any evidence of stenosis. The patient has two vessel runoff to the left lower extremity. Peripheral angiogram 4/28/15 (Dr. Bailey Soriano)   Irregular right superficial femoral artery through the majority of its course without hemodynamically significant stenoses. The right superficial femoral artery and proximal above knee popliteal artery were heavily diseased and occluded for a distance of 3 cm at the adductor canal.  The right popliteal artery reconstituted at that level with widely patent below knee  runoff through the posterior tibial and peroneal arteries. Unsuccessful attempt at recanalization and angioplasty of the right  superficial femoral/popliteal artery occlusion. Peripheral angiogram 10/21/19  Abdominal aorta has small aneurysm present. It is tortuous. Patent mesenteric and renals. Left and right common iliac arteries and external iliac arteries are patent. Left common femoral artery and profunda are patent. Left superficial femoral artery is occluded. Left femoropopliteal graft is occluded. The patient has two-vessel run off below the knee. Placement of EKOS tPA catheter after performing mechanical thrombectomy of the left femoropopliteal graft. LE arterial duplex 11/1/19  There is an KIYA of 0.7 on the right. Elevated velocity of the proximal common femoral and mid femoral arteries    suggests a less than 50% stenosis. Elevated velocity of the the proximal popliteal artery suggests a 50-70% stenosis     There is an KIYA of 0.9 on the left. The left femoral to popliteal arterial bypass graft appears patent with    normal waveforms throughout. The majority of the waveforms are biphasic throughout the left lower extremity      CTA abd aorta w bilat. Runoff:11/12/19  1. Right lower extremity: Findings most compatible with chronic occlusion of   the above the knee popliteal artery with distal reconstitution. There is   three-vessel runoff to the level of the ankle.   Two small aneurysms of the popliteal artery are identified, the largest of which measures up to 1.3 cm.   2. Left lower extremity: Patent femoral to popliteal bypass graft with   findings most compatible with chronic occlusion of the superficial femoral   artery. There is three-vessel runoff to the level of the ankle. 3. Thin fluid collection within the subcutaneous fat of the right groin with   a suggestion of a defined wall, the possibility of an abscess cannot be   entirely excluded, correlate with visual inspection. This measures at least   6 cm in the transverse dimension but less than 2 cm in thickness. Please see   body of report for details. 4. Aneurysmal dilatation of the abdominal aorta measuring up to 5 cm in   greatest AP dimension as described in body of report. 5. Indeterminate 1.6 cm left adrenal nodule, this can be further evaluated   via dedicated outpatient adrenal protocol MRI. CT abdomen and pelvis 6/23/2020  Cellulitis of the left inguinal tissues but no abscess. 5.6 cm saccular abdominal aortic aneurysm. Recommend referral to a vascular   specialist.     BLE arterial Doppler 8/24/2021  Right Impression   Right KIYA 0.81. This is consistent with mild PAD at rest.   Elevated velocity of the mid superficial femoral suggests a less than 50%   stenosis. Possible occlusion of the right peroneal artery. Left Impression   Left KIYA 0.58. This is consistent with moderate PAD at rest.   Possible occlusion of the proximal popliteal artery. Occlusion of the left femoral- popliteal arterial bypass graft. Elevated velocity of the proximal superficial femoral artery suggests a less   than 50% stenosis. BLE arterial Doppler 5/31/2022  Impressions   Right Impression   The right ankle/brachial index is . 59 (the DP is 90 and the PT is 100 mmHg). The common femoral artery demonstrates biphasic flow, possibly indicating   aorto-iliac inflow disease.    The distal superficial femoral/proximal popliteal artery is occluded. Three vessel runoff is noted to the foot. Left Impression   The left ankle/brachial index is . 96 (the DP is 136 and the PT is 164 mmHg). The common femoral artery demonstrates biphasic flow, possibly indicating   aorto-iliac inflow disease. There is an occluded left fem-pop PTFE graft noted. Elevated velocities at the proximal superficial femoral artery indicate a >50%   stenosis by velocity criteria (velocity went from 88 to 229 cm/s). Elevated velocities at the mid popliteal artery indicate a >50% stenosis by   velocity criteria (velocity went from 76 to 298 cm/s). Three vessel runoff is noted to the foot. As compared to the previous study of 8/24/21 the right KIYA has dropped from   . 81 down to .59 today and the left KIYA has increased (post balloon   angioplasty) up to . 96 today. BLE arterial Doppler 10/21/2022  Impressions   Right Impression   Right KIYA 0.7. This is consistent with moderate PAD at rest.   Occlusion of the right distal superficial femoral artery. The flow reconstitutes at the proximal popliteal artery. Monophasic flow noted in the right popliteal artery and distally. Left Impression   Left KIYA 0.93. This is consistent with no PAD at rest.   Elevated velocity of the popliteal artery suggest a greater than 50% stenosis. The majority of the waveforms are multiphasic throughout the left lower   extremity. Elevated velocity of the popliteal artery suggest a greater than 50% stenosis. Occlusion of the left fem-popliteal arterial bypass graft was noted. Peripheral angiogram 4/4/2022  ANGIOGRAPHY FINDINGS:  1. Abdominal aorta is patent. 2.  Left common iliac artery 50% stenosis. Right common iliac artery  50% stenosis. 3.  Left superficial femoral artery completely occluded. Profunda  femoris and external iliac arteries are patent. 4.  Left anterior tibial artery and posterior tibial artery are patent.      SUMMARY:  Successful revascularization of left superficial femoral artery. Balloon angioplasty alone with a 6-mm drug-coated balloon. Assessment and Plan     Peripheral Arterial Disease and Claudication  S/p Successful revascularization of left superficial femoral  artery. Balloon angioplasty alone with a 6-mm drug-coated balloon. Increased calf pain R>L. Continue Xarelto and Plavix. Encouraged regular walking progrmam. She will need intervention to her right leg. I have explained to her the risks and benefits of the procedure. She is willing to proceed. Abdominal aortic aneurysm   S/p open repair 10/16/2020. Managed by Dr Bailey Soriano. Hyperlipidemia, mixed   Continue Pravachol 40 mg daily. Essential Hypertension  Controlled. Continue current medical management. Murmur  Asymptomatic. Nicotine Addiction. Occasionally smokes. I have spent 3-5 minutes on smoking cessation. Diabetes   Managed per PCP    Follow up after procedure. Thank you for allowing us to participate in the care of Niranjan Pope. Please do not hesitate to contact me if you have any questions.     Bernadine Lundborg, MD, MPH    [place] [address]  Ph: [phone]  Fax: [phone]      This note was scribed in the presence of Dr Elidia Kelsey, by Harshad Hollingsworth RN

## 2022-10-27 NOTE — TELEPHONE ENCOUNTER
Patient in the office 10/27/2022 and needs to be scheduled for peripheral angiogram (right). The morning of your procedure you will park in the hospital parking lot and report directly to the cath lab to check in.     Pre-Procedure Instructions   You will need to fast for at least 8 hours prior to procedure. No caffeine the morning of. You will need to hold your anticoagulation, Xarelto for 2 days prior. All other medications can be taken in the morning with sips of water. Do not use any lotions, creams or perfume the morning of procedure. Pre-procedure lab work will need to be completed 5-7 days prior to procedure. Please have a responsible adult to drive you home after procedure. We advise you have someone to stay with you for 24 hours following procedure for precautionary measures. Depending on procedure you may require an overnight stay. Cath lab will provide you with all post procedure instructions. If you have any questions regarding the procedure itself or medications, please call [phone] and ask to speak with a nurse.

## 2022-11-29 DIAGNOSIS — I73.9 PAD (PERIPHERAL ARTERY DISEASE) (HCC): ICD-10-CM

## 2022-11-29 LAB
ANION GAP SERPL CALCULATED.3IONS-SCNC: 11 MMOL/L (ref 3–16)
BUN BLDV-MCNC: 12 MG/DL (ref 7–20)
CALCIUM SERPL-MCNC: 9.9 MG/DL (ref 8.3–10.6)
CHLORIDE BLD-SCNC: 103 MMOL/L (ref 99–110)
CO2: 28 MMOL/L (ref 21–32)
CREAT SERPL-MCNC: 1.2 MG/DL (ref 0.6–1.2)
GFR SERPL CREATININE-BSD FRML MDRD: 47 ML/MIN/{1.73_M2}
GLUCOSE BLD-MCNC: 115 MG/DL (ref 70–99)
HCT VFR BLD CALC: 46.5 % (ref 36–48)
HEMOGLOBIN: 15.3 G/DL (ref 12–16)
MCH RBC QN AUTO: 30.2 PG (ref 26–34)
MCHC RBC AUTO-ENTMCNC: 33 G/DL (ref 31–36)
MCV RBC AUTO: 91.7 FL (ref 80–100)
PDW BLD-RTO: 14.5 % (ref 12.4–15.4)
PLATELET # BLD: 244 K/UL (ref 135–450)
PMV BLD AUTO: 8.7 FL (ref 5–10.5)
POTASSIUM SERPL-SCNC: 4.8 MMOL/L (ref 3.5–5.1)
RBC # BLD: 5.08 M/UL (ref 4–5.2)
SODIUM BLD-SCNC: 142 MMOL/L (ref 136–145)
WBC # BLD: 8.2 K/UL (ref 4–11)

## 2022-11-30 ENCOUNTER — HOSPITAL ENCOUNTER (OUTPATIENT)
Dept: CARDIAC CATH/INVASIVE PROCEDURES | Age: 72
Discharge: HOME | End: 2022-11-30
Payer: COMMERCIAL

## 2022-11-30 VITALS
BODY MASS INDEX: 30.82 KG/M2 | OXYGEN SATURATION: 97 % | HEIGHT: 65 IN | TEMPERATURE: 98.1 F | HEART RATE: 77 BPM | WEIGHT: 185 LBS | RESPIRATION RATE: 16 BRPM | SYSTOLIC BLOOD PRESSURE: 148 MMHG | DIASTOLIC BLOOD PRESSURE: 75 MMHG

## 2022-11-30 PROBLEM — I70.223 CRITICAL LIMB ISCHEMIA OF BOTH LOWER EXTREMITIES (HCC): Status: ACTIVE | Noted: 2019-10-21

## 2022-11-30 LAB
POC ACT LR: 218 SEC
POC ACT LR: 289 SEC

## 2022-11-30 PROCEDURE — 75710 ARTERY X-RAYS ARM/LEG: CPT

## 2022-11-30 PROCEDURE — C2623 CATH, TRANSLUMIN, DRUG-COAT: HCPCS

## 2022-11-30 PROCEDURE — 2580000003 HC RX 258

## 2022-11-30 PROCEDURE — 99153 MOD SED SAME PHYS/QHP EA: CPT

## 2022-11-30 PROCEDURE — 2709999900 HC NON-CHARGEABLE SUPPLY

## 2022-11-30 PROCEDURE — 2500000003 HC RX 250 WO HCPCS

## 2022-11-30 PROCEDURE — 37224 PR REVSC OPN/PRG FEM/POP W/ANGIOPLASTY UNI: CPT | Performed by: INTERNAL MEDICINE

## 2022-11-30 PROCEDURE — C1887 CATHETER, GUIDING: HCPCS

## 2022-11-30 PROCEDURE — 37224 HC FEM POP TERRITORY PLASTY: CPT

## 2022-11-30 PROCEDURE — 85347 COAGULATION TIME ACTIVATED: CPT

## 2022-11-30 PROCEDURE — 6360000004 HC RX CONTRAST MEDICATION: Performed by: INTERNAL MEDICINE

## 2022-11-30 PROCEDURE — 75716 ARTERY X-RAYS ARMS/LEGS: CPT | Performed by: INTERNAL MEDICINE

## 2022-11-30 PROCEDURE — C1725 CATH, TRANSLUMIN NON-LASER: HCPCS

## 2022-11-30 PROCEDURE — 6360000002 HC RX W HCPCS

## 2022-11-30 PROCEDURE — 6370000000 HC RX 637 (ALT 250 FOR IP)

## 2022-11-30 PROCEDURE — C1894 INTRO/SHEATH, NON-LASER: HCPCS

## 2022-11-30 PROCEDURE — C1769 GUIDE WIRE: HCPCS

## 2022-11-30 PROCEDURE — 99152 MOD SED SAME PHYS/QHP 5/>YRS: CPT

## 2022-11-30 RX ORDER — SODIUM CHLORIDE 9 MG/ML
INJECTION, SOLUTION INTRAVENOUS PRN
Status: DISCONTINUED | OUTPATIENT
Start: 2022-11-30 | End: 2022-12-01 | Stop reason: HOSPADM

## 2022-11-30 RX ORDER — SODIUM CHLORIDE 0.9 % (FLUSH) 0.9 %
5-40 SYRINGE (ML) INJECTION PRN
Status: DISCONTINUED | OUTPATIENT
Start: 2022-11-30 | End: 2022-12-01 | Stop reason: HOSPADM

## 2022-11-30 RX ORDER — ACETAMINOPHEN 325 MG/1
650 TABLET ORAL EVERY 4 HOURS PRN
Status: DISCONTINUED | OUTPATIENT
Start: 2022-11-30 | End: 2022-12-01 | Stop reason: HOSPADM

## 2022-11-30 RX ORDER — SODIUM CHLORIDE 0.9 % (FLUSH) 0.9 %
5-40 SYRINGE (ML) INJECTION EVERY 12 HOURS SCHEDULED
Status: DISCONTINUED | OUTPATIENT
Start: 2022-11-30 | End: 2022-12-01 | Stop reason: HOSPADM

## 2022-11-30 RX ORDER — DIPHENHYDRAMINE HYDROCHLORIDE 50 MG/ML
25 INJECTION INTRAMUSCULAR; INTRAVENOUS ONCE
Status: DISCONTINUED | OUTPATIENT
Start: 2022-11-30 | End: 2022-12-01 | Stop reason: HOSPADM

## 2022-11-30 RX ORDER — SODIUM CHLORIDE 9 MG/ML
INJECTION, SOLUTION INTRAVENOUS CONTINUOUS
Status: DISCONTINUED | OUTPATIENT
Start: 2022-11-30 | End: 2022-12-01 | Stop reason: HOSPADM

## 2022-11-30 RX ORDER — ONDANSETRON 2 MG/ML
4 INJECTION INTRAMUSCULAR; INTRAVENOUS EVERY 6 HOURS PRN
Status: DISCONTINUED | OUTPATIENT
Start: 2022-11-30 | End: 2022-12-01 | Stop reason: HOSPADM

## 2022-11-30 RX ORDER — ASPIRIN 325 MG
325 TABLET ORAL ONCE
Status: DISCONTINUED | OUTPATIENT
Start: 2022-11-30 | End: 2022-12-01 | Stop reason: HOSPADM

## 2022-11-30 RX ADMIN — IOPAMIDOL 130 ML: 755 INJECTION, SOLUTION INTRAVENOUS at 12:26

## 2022-11-30 NOTE — PROCEDURES
[address] [facility]                            CARDIAC CATHETERIZATION    PATIENT NAME: Jorden Asher                   :        1950  MED REC NO:   [id number]                          ROOM:  ACCOUNT NO:   [de-identified]                           ADMIT DATE: 2022  PROVIDER:     Emmie Whitfield MD    DATE OF PROCEDURE:  2022    PROCEDURES PERFORMED:  1. Right and left lower extremity selective angiography. 2.  Right superficial femoral artery angioplasty. INDICATION FOR PROCEDURE:  Critical limb ischemia. Informed consent obtained. ASA is II. Mallampati score is II. No complications. Estimated blood loss less than 30 mL. PROCEDURE IN DETAIL:  The left groin was prepped and draped in sterile  fashion. Accessed left common femoral artery and inserted 6-[place]  sheath. COLIN inserted in the right common iliac artery and performed  right lower extremity selective angiography. Then, 6-[place] crossover  sheath was advanced and parked in the right superficial femoral artery. Advanced Glidewire Advantage across the lesion. Balloon angioplasty was  performed with a 5-mm balloon catheter. Glidewire Advantage switched  _____ 0.014-inch wire. Then, we used a 5-mm drug-coated balloon to  performed _____ balloon angioplasty and performed further angioplasty  using a 6-mm balloon catheter. After that final angiography was  performed. Then, left lower extremity angiography was performed. All  catheters, wires, and sheaths were removed. Manual pressure applied to  achieve hemostasis. ANGIOGRAPHY FINDINGS:  1. Right and left common iliac artery and external iliac arteries are  patent. 2.  Right common femoral artery and profunda femoris is patent. 3.  Distal right superficial femoral artery is occluded with a  two-vessel runoff below-the-knee.   4.  Left common femoral artery, profunda, and superficial femoral  arteries are patent. Distal left superficial femoral artery has 75%  stenosis at present. SUMMARY:  Successful revascularization of the right superficial femoral  artery. RECOMMENDATIONS:  1. Continue postop post cath care. 2.  Site care. 3.  Risk factor modification. 4.  Continue antiplatelet therapy. 5.  Continue walking program.    The patient will return back and see me after she is recovered from the  procedure and will need outpatient Doppler.         García Gee MD    D: 11/30/2022 11:12:47       T: 11/30/2022 14:28:18     [id number]  Job#: [id number]     Doc#: [id number]    CC:

## 2022-11-30 NOTE — H&P
CC: \"I am having calf pain. \"      Subjective:      History of Present Illness:     Tiny Paulson is a 74 y.o. patient with a PMH significant for PAD with known right SFA occlusion hyperlipidemia, hypertension and type 2 diabetes who presents today for management of PAD. She was admitted to [facility] with worsening claudication and underwent a peripheral angiogram. She had EKOS and thrombectomy of left femoropopliteal graft. Today, she is here for follow up. She has been having calf pain. She knows that she needs to walk more. She did have injection in her left knee. Patient denies exertional chest pain/pressure, dyspnea at rest, PIMENTEL, PND, orthopnea, palpitations, lightheadedness, weight changes, changes in LE edema, and syncope. Patient reports compliance to her medications. Past Medical History:   has a past medical history of Arthritis, Back pain, CAD (coronary artery disease), Cellulitis, COPD (chronic obstructive pulmonary disease) ([address] Depression, Diabetes mellitus ([address] Enlarging abdominal aortic aneurysm (AAA), History of femoral angiogram, Hyperlipidemia, Hypertension, and PVD (peripheral vascular disease) ([address] Surgical History:   has a past surgical history that includes Cholecystectomy; hernia repair; other surgical history (Left, 9-); vascular surgery (Left); Abscess Drainage (Right, 2/19/16); Cardiac catheterization; Abdominal aortic aneurysm repair (N/A, 10/16/2020); and Colonoscopy (N/A, 2/7/2022). Social History:   reports that she has been smoking cigarettes. She has a 22.50 pack-year smoking history. She has never used smokeless tobacco. She reports current alcohol use of about 3.8 standard drinks per week. She reports that she does not use drugs. Family History:  family history includes Heart Surgery in her father.      Home Medications:  Were reviewed and are listed in nursing record and/or below  Home Medications           Prior to Admission medications    Medication Sig Start Date End Date Taking? Authorizing Provider   Budeson-Glycopyrrol-Formoterol (BREZTRI AEROSPHERE) 160-9-4.8 MCG/ACT AERO Inhale into the lungs 2 puffs BID AM and PM     Yes Historical Provider, MD   albuterol sulfate (PROAIR RESPICLICK) 758 (90 Base) MCG/ACT aerosol powder inhalation Inhale 1 puff into the lungs every 4 hours as needed for Wheezing or Shortness of Breath     Yes Historical Provider, MD   ipratropium-albuterol (DUONEB) 0.5-2.5 (3) MG/3ML SOLN nebulizer solution Inhale 1 vial into the lungs every 4 hours     Yes Historical Provider, MD   Spacer/Aero-Holding Chambers JACY 1 Device by Does not apply route daily 10/11/22   Yes Pinky Barreto MD   XARELTO 2.5 MG TABS tablet TAKE 1 TABLET BY MOUTH TWICE A DAY 7/25/22   Yes Charley Lynne MD   clopidogrel (PLAVIX) 75 MG tablet TAKE 1 TABLET BY MOUTH EVERY DAY  Patient taking differently: Dr. Kj Rangel to stop five days prior 8/31/20   Yes Charley Lynne MD   acetaminophen (TYLENOL) 500 MG tablet Take 500 mg by mouth every 6 hours as needed for Pain     Yes Historical Provider, MD   metFORMIN (GLUCOPHAGE) 500 MG tablet Take 500 mg by mouth as needed Pt states she takes as needed/ she goes back and forth with taking one a day \"here and there\"     Yes Historical Provider, MD   amLODIPine (NORVASC) 5 MG tablet Take 5 mg by mouth 2 times daily      Yes Historical Provider, MD   pravastatin (PRAVACHOL) 40 MG tablet Take 40 mg by mouth daily. Yes Historical Provider, MD   gabapentin (NEURONTIN) 300 MG capsule Take 1 capsule by mouth 2 times daily for 180 days. Intended supply: 30 days 8/31/20 7/8/22   Charley Lynne MD         Allergies:  Ciprocinonide [fluocinolone]      Review of Systems: all reviewed and refer to HPI   Constitutional: no unanticipated weight loss. There's been no change in energy level, sleep pattern, or activity level. No fevers, chills. Eyes: No visual changes or diplopia. No scleral icterus.   ENT: No Headaches, hearing loss or vertigo. No mouth sores or sore throat. Cardiovascular: No Chest pain, tightness or discomfort. No Shortness of breath. No Dyspnea on exertion, Orthopnea, Paroxysmal nocturnal dyspnea or breathlessness at rest.  No Palpitations. No Syncope ('blackouts', 'faints', 'collapse') or dizziness. Respiratory: No cough or wheezing, no sputum production. No hematemesis. Gastrointestinal: No abdominal pain, appetite loss, blood in stools. No change in bowel or bladder habits. Genitourinary: No dysuria, trouble voiding, or hematuria. Musculoskeletal:  No gait disturbance, no joint complaints. Integumentary: No rash or pruritis. Neurological: No headache, diplopia, change in muscle strength, numbness or tingling. Psychiatric: No anxiety or depression. Endocrine: No temperature intolerance. No excessive thirst, fluid intake, or urination. No tremor. Hematologic/Lymphatic: No abnormal bruising or bleeding, blood clots or swollen lymph nodes. Allergic/Immunologic: No nasal congestion or hives. Vascular: BLE edema and pain         Objective:      PHYSICAL EXAM:       Vitals       Vitals:     10/27/22 1033   BP: 120/78   Site: Left Upper Arm   Position: Sitting   Pulse: 87   SpO2: 97%   Weight: 190 lb (86.2 kg)   Height: 5' 5\" (1.651 m)             Weight: 190 lb (86.2 kg)       General Appearance:  Alert, cooperative, no distress, appears stated age. Head:  Normocephalic, without obvious abnormality, atraumatic. Eyes:  Pupils equal and round. No scleral icterus. Mouth: Moist mucosa, no pharyngeal erythema. Nose: Nares normal. No drainage or sinus tenderness. Neck: Supple, symmetrical, trachea midline. No adenopathy. No tenderness/mass/nodules. No carotid bruit or elevated JVD. Lungs:   Respiratory Effort: Normal   Auscultation: Clear to auscultation bilaterally, respirations unlabored. No wheeze, rales   Chest Wall:  No tenderness or deformity.    Cardiovascular:     Pulses  Palpation: normal Ascultation: Regular rate, S1/ S2 normal. No murmur, rub, or gallop. 2+ radial and pedal pulses, symmetric  Carotid  Femoral  + doppler PT, DP on right but - none on left   Abdomen and Gastrointestinal:   Soft, non-tender, bowel sounds active. Liver and Spleen  Masses   Musculoskeletal: No muscle wasting  Back  Gait   Extremities: Extremities normal, atraumatic. No cyanosis or edema. No cyanosis clubbing         Skin: Inspection and palpation performed, no rashes or lesions. Pysch: Normal mood and affect.  Alert and oriented to time place person   Neurologic: Normal gross motor and sensory exam.       Labs      All labs have been reviewed           Lab Results   Component Value Date/Time     WBC 7.6 03/28/2022 01:27 PM     RBC 5.12 03/28/2022 01:27 PM     HGB 15.4 03/28/2022 01:27 PM     HCT 46.2 03/28/2022 01:27 PM     MCV 90.2 03/28/2022 01:27 PM     RDW 14.3 03/28/2022 01:27 PM      03/28/2022 01:27 PM            Lab Results   Component Value Date/Time      03/28/2022 01:27 PM     K 4.3 03/28/2022 01:27 PM     K 4.7 12/03/2021 04:17 PM      03/28/2022 01:27 PM     CO2 28 03/28/2022 01:27 PM     BUN 11 03/28/2022 01:27 PM     CREATININE 1.1 03/28/2022 01:27 PM     GFRAA 59 03/28/2022 01:27 PM     GFRAA >60 03/15/2011 12:43 AM     AGRATIO 1.2 12/03/2021 04:17 PM     LABGLOM 49 03/28/2022 01:27 PM     GLUCOSE 107 03/28/2022 01:27 PM     PROT 7.0 12/03/2021 04:17 PM     PROT 6.5 03/15/2011 12:43 AM     CALCIUM 9.7 03/28/2022 01:27 PM     BILITOT 0.3 12/03/2021 04:17 PM     ALKPHOS 96 12/03/2021 04:17 PM     AST 26 12/03/2021 04:17 PM     ALT 16 12/03/2021 04:17 PM      No results found for: PTINR  No results found for: LABA1C        Lab Results   Component Value Date     TROPONINI <0.01 04/15/2014         Cardiac, Vascular and Imaging Data all Personally Reviewed in Detail by Myself       EKG Interpretation: 2/25/2022 Sinus rhythm                                       7/8/2022 Sinus rhythm Stress Test: 5/1/14 ([facility])   The LV EF is 81%  All segments are normal. There is a small sized, partially reversible defect in the apical wall. This defect is consistent with breast attenuation. 1.Non-diagnostic ECG for ischemia with pharmocologic stress. 2.Normal left ventriular systolic function. 3.Negative nuclear stress imaging for inducible ischemia. 4.Nuclear stress image findings indicate low risk for future ischemic event . Stress test 9/15/2020   Pharmacological Stress/MPI Results:        1. Technically a satisfactory study. 2. Normal pharmacological stress portion of the study. 3. No evidence of Ischemia by Myocardial Perfusion Imaging. 4. Gated Study shows normal LV size and Systolic function; EF is 70 %. ECHO 8/24/2021  Suboptimal image quality. Normal left ventricle size, wall thickness, and systolic function with an  estimated ejection fraction of 60-65%. No regional wall motion abnormalities  are seen. Normal right ventricular size and function. Trivial mitral regurgitation. The aortic valve leaflets are not well visualized but appear thickened. No  stenosis based on doppler velocity/gradient. Peripheral angiogram 12/30/14 ([facility])    Renal arteries are widely patent. Infrarenal aorta is ectatic. May have a small aneurysm. The  common iliac, external and internal iliac arteries are patent. There is some mild disease but no high-grade stenosis noted. Right common femoral, profunda femoral, superficial  Femoral\arteries are patent. Distal SFA is occluded. Reconstitution of the popliteal and the  patient has two-vessel runoff on the right. Left common femoral and profunda femoral arteries  patent. The patient has a fem below-the-knee pop bypass which appears widely patent. Multiple views were obtained at both the proximal and distal anastomosis and I am unable to see any significant stenosis.  The flow does appear to be slightly sluggish  compared to the right side, however, I do not see any evidence of stenosis. The patient has two vessel runoff to the left lower extremity. Peripheral angiogram 4/28/15 (Dr. Amie Leger)   Irregular right superficial femoral artery through the majority of its course without hemodynamically significant stenoses. The right superficial femoral artery and proximal above knee popliteal artery were heavily diseased and occluded for a distance of 3 cm at the adductor canal.  The right popliteal artery reconstituted at that level with widely patent below knee  runoff through the posterior tibial and peroneal arteries. Unsuccessful attempt at recanalization and angioplasty of the right  superficial femoral/popliteal artery occlusion. Peripheral angiogram 10/21/19  Abdominal aorta has small aneurysm present. It is tortuous. Patent mesenteric and renals. Left and right common iliac arteries and external iliac arteries are patent. Left common femoral artery and profunda are patent. Left superficial femoral artery is occluded. Left femoropopliteal graft is occluded. The patient has two-vessel run off below the knee. Placement of EKOS tPA catheter after performing mechanical thrombectomy of the left femoropopliteal graft. LE arterial duplex 11/1/19  There is an KIYA of 0.7 on the right. Elevated velocity of the proximal common femoral and mid femoral arteries    suggests a less than 50% stenosis. Elevated velocity of the the proximal popliteal artery suggests a 50-70% stenosis     There is an KIYA of 0.9 on the left. The left femoral to popliteal arterial bypass graft appears patent with    normal waveforms throughout. The majority of the waveforms are biphasic throughout the left lower extremity       CTA abd aorta w bilat. Runoff:11/12/19  1. Right lower extremity: Findings most compatible with chronic occlusion of   the above the knee popliteal artery with distal reconstitution.   There is   three-vessel runoff to the level of the ankle. 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   2. Left lower extremity: Patent femoral to popliteal bypass graft with   findings most compatible with chronic occlusion of the superficial femoral   artery. There is three-vessel runoff to the level of the ankle. 3. Thin fluid collection within the subcutaneous fat of the right groin with   a suggestion of a defined wall, the possibility of an abscess cannot be   entirely excluded, correlate with visual inspection. This measures at least   6 cm in the transverse dimension but less than 2 cm in thickness. Please see   body of report for details. 4. Aneurysmal dilatation of the abdominal aorta measuring up to 5 cm in   greatest AP dimension as described in body of report. 5. Indeterminate 1.6 cm left adrenal nodule, this can be further evaluated   via dedicated outpatient adrenal protocol MRI. CT abdomen and pelvis 6/23/2020  Cellulitis of the left inguinal tissues but no abscess. 5.6 cm saccular abdominal aortic aneurysm. Recommend referral to a vascular   specialist.      BLE arterial Doppler 8/24/2021  Right Impression   Right KIYA 0.81. This is consistent with mild PAD at rest.   Elevated velocity of the mid superficial femoral suggests a less than 50%   stenosis. Possible occlusion of the right peroneal artery. Left Impression   Left KIYA 0.58. This is consistent with moderate PAD at rest.   Possible occlusion of the proximal popliteal artery. Occlusion of the left femoral- popliteal arterial bypass graft. Elevated velocity of the proximal superficial femoral artery suggests a less   than 50% stenosis. BLE arterial Doppler 5/31/2022  Impressions   Right Impression   The right ankle/brachial index is . 59 (the DP is 90 and the PT is 100 mmHg). The common femoral artery demonstrates biphasic flow, possibly indicating   aorto-iliac inflow disease.    The distal superficial femoral/proximal popliteal artery is occluded. Three vessel runoff is noted to the foot. Left Impression   The left ankle/brachial index is . 96 (the DP is 136 and the PT is 164 mmHg). The common femoral artery demonstrates biphasic flow, possibly indicating   aorto-iliac inflow disease. There is an occluded left fem-pop PTFE graft noted. Elevated velocities at the proximal superficial femoral artery indicate a >50%   stenosis by velocity criteria (velocity went from 88 to 229 cm/s). Elevated velocities at the mid popliteal artery indicate a >50% stenosis by   velocity criteria (velocity went from 76 to 298 cm/s). Three vessel runoff is noted to the foot. As compared to the previous study of 8/24/21 the right KIYA has dropped from   . 81 down to .59 today and the left KIYA has increased (post balloon   angioplasty) up to . 96 today. BLE arterial Doppler 10/21/2022  Impressions   Right Impression   Right KIYA 0.7. This is consistent with moderate PAD at rest.   Occlusion of the right distal superficial femoral artery. The flow reconstitutes at the proximal popliteal artery. Monophasic flow noted in the right popliteal artery and distally. Left Impression   Left KIYA 0.93. This is consistent with no PAD at rest.   Elevated velocity of the popliteal artery suggest a greater than 50% stenosis. The majority of the waveforms are multiphasic throughout the left lower   extremity. Elevated velocity of the popliteal artery suggest a greater than 50% stenosis. Occlusion of the left fem-popliteal arterial bypass graft was noted. Peripheral angiogram 4/4/2022  ANGIOGRAPHY FINDINGS:  1. Abdominal aorta is patent. 2.  Left common iliac artery 50% stenosis. Right common iliac artery  50% stenosis. 3.  Left superficial femoral artery completely occluded. Profunda  femoris and external iliac arteries are patent.   4.  Left anterior tibial artery and posterior tibial artery are patent. SUMMARY:  Successful revascularization of left superficial femoral artery. Balloon angioplasty alone with a 6-mm drug-coated balloon. Assessment and Plan      Peripheral Arterial Disease and Claudication  S/p Successful revascularization of left superficial femoral  artery. Balloon angioplasty alone with a 6-mm drug-coated balloon. Increased calf pain R>L. Continue Xarelto and Plavix. Encouraged regular walking progrmam. She will need intervention to her right leg. I have explained to her the risks and benefits of the procedure. She is willing to proceed. Abdominal aortic aneurysm   S/p open repair 10/16/2020. Managed by Dr Valeri Marcus. Hyperlipidemia, mixed   Continue Pravachol 40 mg daily. Essential Hypertension  Controlled. Continue current medical management. Murmur  Asymptomatic. Nicotine Addiction. Occasionally smokes. I have spent 3-5 minutes on smoking cessation. Diabetes   Managed per PCP     Follow up after procedure. Thank you for allowing us to participate in the care of Rolando Mobley. Please do not hesitate to contact me if you have any questions.      Bev Oseguera MD, MPH

## 2022-11-30 NOTE — DISCHARGE INSTRUCTIONS
PERIPHERAL ANGIOGRAM    Care of your puncture site:  Remove bandage 24 hours after the procedure. May shower in 24 hours but do not sit in a bathtub/pool of water for 5 days or until the wound is healed. Gently clean groin using soap and water. Dry thoroughly and apply a Band-Aid that covers the entire site. Use Band-Aid until skin heals over in about 3-5 days. Do not apply powder or lotion. Normal Observations:  Soreness or tenderness which may last one week. Mild oozing from the incision site. Possible bruising that could last 2 weeks. Activity:  You may resume driving 24 hours following the procedure. Do not make important / legal decisions within 24 hours after procedure. You may resume normal activity in 5 days or after the wound heals. Avoid lifting more than 10 pounds for 5 days or until the wound heals. Avoid strenuous exercise or activity for 1 week. Nutrition:  Regular diet  Drink at least 8 to 10 glasses of decaffeinated, non-alcoholic fluid for the next 24 hours to flush the x-ray dye used for your angiogram out of your body. Call your doctor immediately if your condition worsens, for any other concerns, for a follow-up appointment or if you experience any of the following:  Increased swelling on the groin or leg. Unusual pain, numbness, or tingling of the groin or down the leg. Any signs of infection such as: redness, yellow drainage at the site, swelling or pain. IF GROIN STARTS BLEEDING SIGNIFICANTLY:   LAY FLAT, HOLD FIRM DIRECT PRESSURE, AND CALL 911    Other Instructions:  Hold Metformin or Metformin containing drugs for 48 hours after procedure.

## 2022-12-01 ENCOUNTER — TELEPHONE (OUTPATIENT)
Dept: CARDIOLOGY CLINIC | Age: 72
End: 2022-12-01

## 2022-12-01 NOTE — TELEPHONE ENCOUNTER
Blake Morris is calling stating Dr. Reece Eng did PERIPHERAL VASCULAR ANGIOGRAM   Yesterday 11/30/22 and is complaing of right calf pain and wants to know if that is estrada;. States she was just nervous about it and wanted to ask.     Can be reached at [phone]

## 2022-12-01 NOTE — TELEPHONE ENCOUNTER
Called and spoke to patient and let her know the pain could be due to the increased blood flow. Let her know to wear compression stockings and call with increased symptoms.

## 2022-12-08 ENCOUNTER — OFFICE VISIT (OUTPATIENT)
Dept: CARDIOLOGY CLINIC | Age: 72
End: 2022-12-08
Payer: COMMERCIAL

## 2022-12-08 VITALS
OXYGEN SATURATION: 94 % | BODY MASS INDEX: 30.82 KG/M2 | WEIGHT: 185 LBS | SYSTOLIC BLOOD PRESSURE: 124 MMHG | HEIGHT: 65 IN | HEART RATE: 88 BPM | DIASTOLIC BLOOD PRESSURE: 70 MMHG

## 2022-12-08 DIAGNOSIS — Z71.6 TOBACCO ABUSE COUNSELING: ICD-10-CM

## 2022-12-08 DIAGNOSIS — I73.9 PAD (PERIPHERAL ARTERY DISEASE) (HCC): Primary | ICD-10-CM

## 2022-12-08 DIAGNOSIS — Z72.0 TOBACCO ABUSE: ICD-10-CM

## 2022-12-08 PROCEDURE — 3074F SYST BP LT 130 MM HG: CPT | Performed by: NURSE PRACTITIONER

## 2022-12-08 PROCEDURE — 3017F COLORECTAL CA SCREEN DOC REV: CPT | Performed by: NURSE PRACTITIONER

## 2022-12-08 PROCEDURE — 1123F ACP DISCUSS/DSCN MKR DOCD: CPT | Performed by: NURSE PRACTITIONER

## 2022-12-08 PROCEDURE — G8428 CUR MEDS NOT DOCUMENT: HCPCS | Performed by: NURSE PRACTITIONER

## 2022-12-08 PROCEDURE — G8417 CALC BMI ABV UP PARAM F/U: HCPCS | Performed by: NURSE PRACTITIONER

## 2022-12-08 PROCEDURE — 99406 BEHAV CHNG SMOKING 3-10 MIN: CPT | Performed by: NURSE PRACTITIONER

## 2022-12-08 PROCEDURE — 99214 OFFICE O/P EST MOD 30 MIN: CPT | Performed by: NURSE PRACTITIONER

## 2022-12-08 PROCEDURE — 4004F PT TOBACCO SCREEN RCVD TLK: CPT | Performed by: NURSE PRACTITIONER

## 2022-12-08 PROCEDURE — 3078F DIAST BP <80 MM HG: CPT | Performed by: NURSE PRACTITIONER

## 2022-12-08 PROCEDURE — G8400 PT W/DXA NO RESULTS DOC: HCPCS | Performed by: NURSE PRACTITIONER

## 2022-12-08 PROCEDURE — 1090F PRES/ABSN URINE INCON ASSESS: CPT | Performed by: NURSE PRACTITIONER

## 2022-12-08 PROCEDURE — G8484 FLU IMMUNIZE NO ADMIN: HCPCS | Performed by: NURSE PRACTITIONER

## 2022-12-08 NOTE — PROGRESS NOTES
The [address] [place] [address], [address] [place] [address] [address] [address]  [phone]    PrimaryCare Doctor:  Sendy Ward MD  Primary Cardiologist: Dr. Diogo Leroy    Chief Complaint   Patient presents with    Follow-up     No /Cc         History of Present Illness:  Nicole Sepulveda is a 74 y.o. female with PMH PAD w known R SFA occlusion, s/p peripheral angiogram w EKOS and thrombectomy of L fem/pop graft 2019, S/p  revasc of L superficial fem artery w balloon angioplasty 4/2022, HLP, HTN, DM2, Abdominal aortic aneurysm s/p repair 10/2020 (Dr. Kaveh Steel smoker  Seen by Dr. Diogo Leroy 10/27/22 with C/O RLE claudication. Underwent peripheral cath with successful revascularization 11/30/2022. Patient presents to [facility] cardiology for follow up for PAD. Patient reports increased swelling BLE since shopping and being on her feet for several hours a few days ago. She C/O moderate aching pain R calf with ambulation. Improves with rest.   Dr. Diogo Leroy made aware- KIYA in office completed and are normal.   Strong doppler pedal pulse BLE. She denies SOB, CP, LH, dizziness, palpitations, syncope, orthopnea. Review of Systems:   General: Denies fever, chills, fatigue, weakness  Skin: Denies skin changes, rash, itching, lesions.   HEENT: Denies headache, dizziness, vision changes, nosebleeds, sore throat, nasal drainage  RESP: Denies cough, sputum, dyspnea, wheeze, snoring  CARD: Denies palpitations,  murmur  GI:Denies nausea, vomiting, heartburn, loss of appetite, change in bowels  : Denies frequency, pain, incontinence, polyuria  VASC: Denies claudication, leg cramps, clots  MUSC/SKEL: Denies pain, stiffness, arthritis  PSYCH: Denies anxiety, depression, stress  NEURO: Denies numbness, tingling, weakness,change in mood or memory  HEME: Denies abn bruising, bleeding, anemia  ENDO: Denies intolerance to heat, cold, excessive thirst or hunger, hx thyroid disease    /70   Pulse 88   Ht 5' 5\" (1.651 m)   Wt 185 lb (83.9 kg)   SpO2 94%   BMI 30.79 kg/m²   Wt Readings from Last 3 Encounters:   12/08/22 185 lb (83.9 kg)   11/30/22 185 lb (83.9 kg)   10/27/22 190 lb (86.2 kg)       Physical Exam:  GEN: Appears well, no acute distress  SKIN: Pink, warm, dry. Nails without clubbing. HEENT: PERRLA. Normocephalic, atraumatic. Neck supple. No adenopathy. LUNG: AP diameter normal. Clear bilateral. No wheeze, rales, or ronchi. Respiratory effort normal.  HEART: S1S2 A/R. No JVD. No carotid bruit. No murmur, rub or gallop. ABD: Soft, nontender. +BS X 4 quads. No hepatomegaly. EXT: Radial and pedal pulses 2+ with doppler and symmetric. Without varicosities. 1+ BLE edema. RLE slightly red. MUSCSKEL: Good ROM X4 extremities. No deformity. NEURO: A/O X3. Calm and cooperative. Past Medical History:   has a past medical history of Arthritis, Back pain, CAD (coronary artery disease), Cellulitis, COPD (chronic obstructive pulmonary disease) ([address] Depression, Diabetes mellitus ([address] Enlarging abdominal aortic aneurysm (AAA), History of femoral angiogram, Hyperlipidemia, Hypertension, and PVD (peripheral vascular disease) ([address] Surgical History:   has a past surgical history that includes Cholecystectomy; hernia repair; other surgical history (Left, 9-); vascular surgery (Left); Abscess Drainage (Right, 2/19/16); Cardiac catheterization; Abdominal aortic aneurysm repair (N/A, 10/16/2020); and Colonoscopy (N/A, 2/7/2022). Social History:   reports that she has been smoking cigarettes. She has a 22.50 pack-year smoking history. She has never used smokeless tobacco. She reports current alcohol use of about 3.8 standard drinks per week. She reports that she does not use drugs. Family History:   Family History   Problem Relation Age of Onset    Heart Surgery Father        HomeMedications:  Prior to Admission medications    Medication Sig Start Date End Date Taking?  Authorizing Provider Budeson-Glycopyrrol-Formoterol (BREZTRI AEROSPHERE) 160-9-4.8 MCG/ACT AERO Inhale into the lungs 2 puffs BID AM and PM   Yes Historical Provider, MD   albuterol sulfate (PROAIR RESPICLICK) 048 (90 Base) MCG/ACT aerosol powder inhalation Inhale 1 puff into the lungs every 4 hours as needed for Wheezing or Shortness of Breath   Yes Historical Provider, MD   ipratropium-albuterol (DUONEB) 0.5-2.5 (3) MG/3ML SOLN nebulizer solution Inhale 1 vial into the lungs every 4 hours   Yes Historical Provider, MD   XARELTO 2.5 MG TABS tablet TAKE 1 TABLET BY MOUTH TWICE A DAY 22  Yes Prema Samson MD   clopidogrel (PLAVIX) 75 MG tablet TAKE 1 TABLET BY MOUTH EVERY DAY 20  Yes Prema Samson MD   gabapentin (NEURONTIN) 300 MG capsule Take 1 capsule by mouth 2 times daily for 180 days. Intended supply: 30 days 20 Yes Prema Samson MD   acetaminophen (TYLENOL) 500 MG tablet Take 500 mg by mouth every 6 hours as needed for Pain   Yes Historical Provider, MD   metFORMIN (GLUCOPHAGE) 500 MG tablet Take 500 mg by mouth as needed Pt states she takes as needed/ she goes back and forth with taking one a day \"here and there\"   Yes Historical Provider, MD   amLODIPine (NORVASC) 5 MG tablet Take 5 mg by mouth 2 times daily    Yes Historical Provider, MD   pravastatin (PRAVACHOL) 40 MG tablet Take 40 mg by mouth daily. Yes Historical Provider, MD   Spacer/Aero-Holding Chambers JACY 1 Device by Does not apply route daily  Patient not taking: Reported on 2022 10/11/22   [place] Jean Baptiste MD        Allergies:  Ciprocinonide [fluocinolone]       LABS: Results reviewed with patient today.     CBC:   Lab Results   Component Value Date/Time    WBC 8.2 2022 09:38 AM    WBC 7.6 2022 01:27 PM    WBC 8.0 2021 04:17 PM    RBC 5.08 2022 09:38 AM    RBC 5.12 2022 01:27 PM    RBC 5.26 2021 04:17 PM    HGB 15.3 2022 09:38 AM    HGB 15.4 2022 01:27 PM    HGB 15.8 2021 04:17 PM HCT 46.5 11/29/2022 09:38 AM    HCT 46.2 03/28/2022 01:27 PM    HCT 49.0 12/03/2021 04:17 PM    MCV 91.7 11/29/2022 09:38 AM    MCV 90.2 03/28/2022 01:27 PM    MCV 93.1 12/03/2021 04:17 PM    RDW 14.5 11/29/2022 09:38 AM    RDW 14.3 03/28/2022 01:27 PM    RDW 14.6 12/03/2021 04:17 PM     11/29/2022 09:38 AM     03/28/2022 01:27 PM     12/03/2021 04:17 PM     BMP:  Lab Results   Component Value Date/Time     11/29/2022 09:38 AM     03/28/2022 01:27 PM     12/03/2021 04:17 PM    K 4.8 11/29/2022 09:38 AM    K 4.3 03/28/2022 01:27 PM    K 4.7 12/03/2021 04:17 PM    K 3.6 10/22/2020 05:55 AM    K 4.0 06/23/2020 10:08 AM     11/29/2022 09:38 AM     03/28/2022 01:27 PM    CL 97 12/03/2021 04:17 PM    CO2 28 11/29/2022 09:38 AM    CO2 28 03/28/2022 01:27 PM    CO2 21 12/03/2021 04:17 PM    BUN 12 11/29/2022 09:38 AM    BUN 11 03/28/2022 01:27 PM    BUN 10 12/03/2021 04:17 PM    CREATININE 1.2 11/29/2022 09:38 AM    CREATININE 1.1 03/28/2022 01:27 PM    CREATININE 1.0 12/03/2021 04:17 PM     BNP: No results found for: PROBNP    Parameters:   > 450 pg/mL under age 48  > 900 pg/mL ages 54-65  > 1800 pg/mL over age 76    Iron Studies:  No results found for: TIBC, FERRITIN  GLUCOSE: No results for input(s): GLUCOSE in the last 72 hours.   LIVER PROFILE:   Lab Results   Component Value Date/Time    AST 26 12/03/2021 04:17 PM    ALT 16 12/03/2021 04:17 PM    LIPASE 28.0 12/03/2021 04:17 PM    AMYLASE 92 10/17/2020 03:52 AM    LABALBU 3.8 12/03/2021 04:17 PM    BILITOT 0.3 12/03/2021 04:17 PM    ALKPHOS 96 12/03/2021 04:17 PM     PT/INR:   Lab Results   Component Value Date/Time    PROTIME 12.6 10/17/2020 03:52 AM    INR 1.09 10/17/2020 03:52 AM     Cardiac Enzymes:  Lab Results   Component Value Date/Time    TROPONINI <0.01 04/15/2014 12:33 AM     FASTING LIPID PANEL:No results found for: CHOL, HDL, LDLCALC, TRIG  TSH:   Lab Results   Component Value Date/Time    TSH 3.37 12/27/2021 10:19 AM       Cardiac Imaging: Reports interpreted by me and reviewed with patient today. EKG:    Stress Test: 5/1/14 ([facility])   The LV EF is 81%  All segments are normal. There is a small sized, partially reversible defect in the apical wall. This defect is consistent with breast attenuation. 1.Non-diagnostic ECG for ischemia with pharmocologic stress. 2.Normal left ventriular systolic function. 3.Negative nuclear stress imaging for inducible ischemia. 4.Nuclear stress image findings indicate low risk for future ischemic event . Stress test 9/15/2020   Pharmacological Stress/MPI Results:        1. Technically a satisfactory study. 2. Normal pharmacological stress portion of the study. 3. No evidence of Ischemia by Myocardial Perfusion Imaging. 4. Gated Study shows normal LV size and Systolic function; EF is 70 %. ECHO 8/24/2021  Suboptimal image quality. Normal left ventricle size, wall thickness, and systolic function with an  estimated ejection fraction of 60-65%. No regional wall motion abnormalities  are seen. Normal right ventricular size and function. Trivial mitral regurgitation. The aortic valve leaflets are not well visualized but appear thickened. No  stenosis based on doppler velocity/gradient. Peripheral angiogram 12/30/14 ([facility])    Renal arteries are widely patent. Infrarenal aorta is ectatic. May have a small aneurysm. The  common iliac, external and internal iliac arteries are patent. There is some mild disease but no high-grade stenosis noted. Right common femoral, profunda femoral, superficial  Femoral\arteries are patent. Distal SFA is occluded. Reconstitution of the popliteal and the  patient has two-vessel runoff on the right. Left common femoral and profunda femoral arteries  patent. The patient has a fem below-the-knee pop bypass which appears widely patent.   Multiple views were obtained at both the proximal and distal anastomosis and I am unable to see any significant stenosis. The flow does appear to be slightly sluggish  compared to the right side, however, I do not see any evidence of stenosis. The patient has two vessel runoff to the left lower extremity. Peripheral angiogram 4/28/15 (Dr. Yvonne Germain)   Irregular right superficial femoral artery through the majority of its course without hemodynamically significant stenoses. The right superficial femoral artery and proximal above knee popliteal artery were heavily diseased and occluded for a distance of 3 cm at the adductor canal.  The right popliteal artery reconstituted at that level with widely patent below knee  runoff through the posterior tibial and peroneal arteries. Unsuccessful attempt at recanalization and angioplasty of the right  superficial femoral/popliteal artery occlusion. Peripheral angiogram 10/21/19  Abdominal aorta has small aneurysm present. It is tortuous. Patent mesenteric and renals. Left and right common iliac arteries and external iliac arteries are patent. Left common femoral artery and profunda are patent. Left superficial femoral artery is occluded. Left femoropopliteal graft is occluded. The patient has two-vessel run off below the knee. Placement of EKOS tPA catheter after performing mechanical thrombectomy of the left femoropopliteal graft. LE arterial duplex 11/1/19  There is an KIYA of 0.7 on the right. Elevated velocity of the proximal common femoral and mid femoral arteries    suggests a less than 50% stenosis. Elevated velocity of the the proximal popliteal artery suggests a 50-70% stenosis     There is an KIYA of 0.9 on the left. The left femoral to popliteal arterial bypass graft appears patent with    normal waveforms throughout. The majority of the waveforms are biphasic throughout the left lower extremity       CTA abd aorta w bilat. Runoff:11/12/19  1.  Right lower extremity: Findings most compatible with chronic occlusion of the above the knee popliteal artery with distal reconstitution. There is   three-vessel runoff to the level of the ankle. 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   2. Left lower extremity: Patent femoral to popliteal bypass graft with   findings most compatible with chronic occlusion of the superficial femoral   artery. There is three-vessel runoff to the level of the ankle. 3. Thin fluid collection within the subcutaneous fat of the right groin with   a suggestion of a defined wall, the possibility of an abscess cannot be   entirely excluded, correlate with visual inspection. This measures at least   6 cm in the transverse dimension but less than 2 cm in thickness. Please see   body of report for details. 4. Aneurysmal dilatation of the abdominal aorta measuring up to 5 cm in   greatest AP dimension as described in body of report. 5. Indeterminate 1.6 cm left adrenal nodule, this can be further evaluated   via dedicated outpatient adrenal protocol MRI. CT abdomen and pelvis 6/23/2020  Cellulitis of the left inguinal tissues but no abscess. 5.6 cm saccular abdominal aortic aneurysm. Recommend referral to a vascular   specialist.      BLE arterial Doppler 8/24/2021  Right Impression   Right KIYA 0.81. This is consistent with mild PAD at rest.   Elevated velocity of the mid superficial femoral suggests a less than 50%   stenosis. Possible occlusion of the right peroneal artery. Left Impression   Left KIYA 0.58. This is consistent with moderate PAD at rest.   Possible occlusion of the proximal popliteal artery. Occlusion of the left femoral- popliteal arterial bypass graft. Elevated velocity of the proximal superficial femoral artery suggests a less   than 50% stenosis. BLE arterial Doppler 5/31/2022  Impressions   Right Impression   The right ankle/brachial index is . 59 (the DP is 90 and the PT is 100 mmHg).    The common femoral artery demonstrates biphasic flow, possibly indicating   aorto-iliac inflow disease. The distal superficial femoral/proximal popliteal artery is occluded. Three vessel runoff is noted to the foot. Left Impression   The left ankle/brachial index is . 96 (the DP is 136 and the PT is 164 mmHg). The common femoral artery demonstrates biphasic flow, possibly indicating   aorto-iliac inflow disease. There is an occluded left fem-pop PTFE graft noted. Elevated velocities at the proximal superficial femoral artery indicate a >50%   stenosis by velocity criteria (velocity went from 88 to 229 cm/s). Elevated velocities at the mid popliteal artery indicate a >50% stenosis by   velocity criteria (velocity went from 76 to 298 cm/s). Three vessel runoff is noted to the foot. As compared to the previous study of 8/24/21 the right KIYA has dropped from   . 81 down to .59 today and the left KIYA has increased (post balloon   angioplasty) up to . 96 today. BLE arterial Doppler 10/21/2022  Impressions   Right Impression   Right KIYA 0.7. This is consistent with moderate PAD at rest.   Occlusion of the right distal superficial femoral artery. The flow reconstitutes at the proximal popliteal artery. Monophasic flow noted in the right popliteal artery and distally. Left Impression   Left KIYA 0.93. This is consistent with no PAD at rest.   Elevated velocity of the popliteal artery suggest a greater than 50% stenosis. The majority of the waveforms are multiphasic throughout the left lower   extremity. Elevated velocity of the popliteal artery suggest a greater than 50% stenosis. Occlusion of the left fem-popliteal arterial bypass graft was noted. Peripheral angiogram 4/4/2022  ANGIOGRAPHY FINDINGS:  1. Abdominal aorta is patent. 2.  Left common iliac artery 50% stenosis. Right common iliac artery  50% stenosis. 3.  Left superficial femoral artery completely occluded. Profunda  femoris and external iliac arteries are patent. 4.  Left anterior tibial artery and posterior tibial artery are patent. SUMMARY:  Successful revascularization of left superficial femoral artery. Balloon angioplasty alone with a 6-mm drug-coated balloon. 11/30/2022  ANGIOGRAPHY FINDINGS:  1. Right and left common iliac artery and external iliac arteries are  patent. 2.  Right common femoral artery and profunda femoris is patent. 3.  Distal right superficial femoral artery is occluded with a  two-vessel runoff below-the-knee. 4.  Left common femoral artery, profunda, and superficial femoral  arteries are patent. Distal left superficial femoral artery has 75%  stenosis at present. SUMMARY:  Successful revascularization of the right superficial femoral  artery. RECOMMENDATIONS:  1. Continue postop post cath care. 2.  Site care. 3.  Risk factor modification. 4.  Continue antiplatelet therapy. 5.  Continue walking program.     The patient will return back and see me after she is recovered from the  procedure and will need outpatient Doppler. KIYA in office 12/8/2022  R - 1.05  L - 1.13    Assessment/Plan:      1.) PAD: S/P revascularization of LLE 2019 and 4/2022. Presented for OV w Dr. Romana Turner 10/27/2022 with claudication RLE. Underwent successful revascularization of LLE. Supervised Exercise Therapy - 3x/week for 12 weeks- she is getting a puppy next week and will be walking her dog on most days- she wants to wait to schedule supervised exercise program.   Repeat arterial doppler in 4 mos and FU w Dr. Romana Turner after. Cont plavix and xarelto.   Risk factor management: Smoking cessation, cardiac/low cholesterol diet, BS control (consider SGLT2i), weight reduction    2.) Hyperlipidemia:   LDL Goal < 70  Statin:  pravachol          Low cholesterol diet  Liver fx 2 months after initiation then q6mos  Lipid panel annually    3.) Tobacco abuse: Now using vape- adm to occasional cigarette  Patient continues to smoke cigarettes. Smoking cessation advised. Smoking cessation counseling provided > 3 min. Benefits of smoking cessation include decrease risk for lung disease, heart disease, stroke, and peripheral vascular disease. Recommendations include medications such as Chantix, Zyban or nicotine replacement products, counseling and resources such as 1-800-QUIT-NOW. Will continue to monitor progress and follow up next office visit. Instructions:     Heart Healthy Diet  Blood pressure control if  you have high blood pressure  Diabetic control if you have diabetes  Smoking cessation if you smoke  Weight loss if BMI >30  Regular exercise when OK per cardiac rehab  Stress Reduction    Call your Doctor if you have:   Increased shortness of breath, weakness, or increased fatigue  A fast or a slow heartbeat  Problems or questions with your medications  Feeling lightheaded or dizzy  Unusual swelling of lower legs/feet, chest discomfort, unusual weakness or fatigue    I appreciate the opportunity of cooperating in the care of this individual.    LILI Penn - CNP, CNP, 12/8/2022,4:46 PM

## 2022-12-16 ENCOUNTER — TELEPHONE (OUTPATIENT)
Dept: CARDIOLOGY CLINIC | Age: 72
End: 2022-12-16

## 2022-12-16 NOTE — TELEPHONE ENCOUNTER
Medication Samples    Medication: XARELTO 2.5 MG TABS    Dosage of the medication: TAKE 1 TABLET BY MOUTH TWICE A DAY    How are you taking this medication (QD, BID, TID, QID, PRN): TAKE 1 TABLET BY MOUTH TWICE A DAY     in the office or Mail to your home? Will . ... She also stated that if you do not have any if she can double up on the plavix to get her through this month that she will have new insurance starting in January.

## 2023-01-06 ENCOUNTER — TELEPHONE (OUTPATIENT)
Dept: CARDIOLOGY CLINIC | Age: 73
End: 2023-01-06

## 2023-01-06 ENCOUNTER — HOSPITAL ENCOUNTER (OUTPATIENT)
Dept: CT IMAGING | Age: 73
Discharge: HOME | End: 2023-01-06
Payer: MEDICARE

## 2023-01-06 DIAGNOSIS — I71.23 ANEURYSM OF DESCENDING THORACIC AORTA WITHOUT RUPTURE: Primary | ICD-10-CM

## 2023-01-06 DIAGNOSIS — Z87.891 PERSONAL HISTORY OF TOBACCO USE: ICD-10-CM

## 2023-01-06 PROCEDURE — 71271 CT THORAX LUNG CANCER SCR C-: CPT

## 2023-01-06 NOTE — RESULT ENCOUNTER NOTE
Called and left voicemail about findings. Advised the mucus in the airway likely from inflammation versus low-level infection. Can consider sputum culture versus monitoring. Further, advised aorta was enlarged and made recommendation to see a vascular surgeon. She has seen Dr. Andrea Johnson in the past.  Yenny Melonie her to follow-up with vascular surgeon if she is already connected. If she has not connected with the vascular surgeon, she was advised to call and I will make a referral.    Will review CAT scan with the patient at next appointment.     Future Appointments  1/11/2023  10:40 AM   Dinah Dominguez MD    [facility] PULM             [facility]  4/3/2023   9:30 AM    SCHEDULE, [facility]ITA VASCULAR R* [facility]           [facility]  5/4/2023   9:00 AM    Catalina Howard MD             [facility]

## 2023-01-09 ENCOUNTER — HOSPITAL ENCOUNTER (OUTPATIENT)
Dept: CT IMAGING | Age: 73
Discharge: HOME | End: 2023-01-09
Payer: MEDICARE

## 2023-01-09 ENCOUNTER — TELEPHONE (OUTPATIENT)
Dept: PULMONOLOGY | Age: 73
End: 2023-01-09

## 2023-01-09 ENCOUNTER — TELEPHONE (OUTPATIENT)
Dept: CASE MANAGEMENT | Age: 73
End: 2023-01-09

## 2023-01-09 DIAGNOSIS — I71.23 ANEURYSM OF THE DESCENDING THORACIC AORTA, WITHOUT RUPTURE: Primary | ICD-10-CM

## 2023-01-09 DIAGNOSIS — I71.23 ANEURYSM OF DESCENDING THORACIC AORTA WITHOUT RUPTURE: ICD-10-CM

## 2023-01-09 LAB
GFR SERPL CREATININE-BSD FRML MDRD: 53 ML/MIN/{1.73_M2}
PERFORMED ON: ABNORMAL
PERFORMED ON: NORMAL
POC CREATININE: 1.1 MG/DL (ref 0.6–1.2)
POC SAMPLE TYPE: ABNORMAL
POC SAMPLE TYPE: NORMAL

## 2023-01-09 PROCEDURE — 6360000004 HC RX CONTRAST MEDICATION: Performed by: INTERNAL MEDICINE

## 2023-01-09 PROCEDURE — 71275 CT ANGIOGRAPHY CHEST: CPT

## 2023-01-09 PROCEDURE — 82565 ASSAY OF CREATININE: CPT

## 2023-01-09 RX ADMIN — IOPAMIDOL 75 ML: 755 INJECTION, SOLUTION INTRAVENOUS at 13:06

## 2023-01-09 NOTE — TELEPHONE ENCOUNTER
Called and spoke to patient and she is agreeable to CTA. She is agreeable Please schedule CTA ASAP and call her with the date and time.

## 2023-01-09 NOTE — TELEPHONE ENCOUNTER
Raudel Melchor is scheduled for today 1/9/23 at [facility] at 1:00 pm    I called and went over the time and instructions, she v/u

## 2023-01-09 NOTE — TELEPHONE ENCOUNTER
CT Lung Cancer Screening completed on 1/6/2022, ordering provider, Dr James Yo, reviewed radiology results with recommendations & office notified patient of results with recommendations. Order placed for CTA Chest With Contrast by Dr Renetta Luque & patient is scheduled for 1/9/2023. Patient is scheduled for FU with Dr James Yo on 1/11/2023. Smoking history reviewed.

## 2023-01-10 NOTE — TELEPHONE ENCOUNTER
Asmita Rios called in this morning wanting to know if Dr. Lori Patricia was able to review her CTA results?      You can reach Asmita Rios at #[phone]

## 2023-01-10 NOTE — TELEPHONE ENCOUNTER
Attempted to call, no answer, LM    Patient returned call. I made her aware of Dr. Elvin Nissen comments on her CTA. She was concerned because the pulmonary doctor told her to see a vascular surgeon and she did not know if that was necessary. I instructed her that Dr. Jaclyn Stevens did not recommend a surgeon at this point, but I will check with him. She also stated she is starting to have  increased pain in her right leg. Dopplers scheduled for 4/3/23. She said no urgency but she wanted Dr. Jaclyn Stevens to be aware. Also wanted to know when or how often  she will repeat the CTA?

## 2023-01-11 ENCOUNTER — OFFICE VISIT (OUTPATIENT)
Dept: PULMONOLOGY | Age: 73
End: 2023-01-11
Payer: MEDICARE

## 2023-01-11 VITALS
BODY MASS INDEX: 30.82 KG/M2 | HEART RATE: 64 BPM | TEMPERATURE: 97.7 F | WEIGHT: 185 LBS | HEIGHT: 65 IN | OXYGEN SATURATION: 97 % | SYSTOLIC BLOOD PRESSURE: 140 MMHG | DIASTOLIC BLOOD PRESSURE: 70 MMHG

## 2023-01-11 DIAGNOSIS — J44.9 COPD, MODERATE (HCC): ICD-10-CM

## 2023-01-11 DIAGNOSIS — I71.23 ANEURYSM OF DESCENDING THORACIC AORTA WITHOUT RUPTURE: ICD-10-CM

## 2023-01-11 DIAGNOSIS — Z71.6 TOBACCO ABUSE COUNSELING: ICD-10-CM

## 2023-01-11 PROBLEM — I71.21 ANEURYSM OF ASCENDING AORTA WITHOUT RUPTURE: Status: ACTIVE | Noted: 2023-01-11

## 2023-01-11 PROCEDURE — G8484 FLU IMMUNIZE NO ADMIN: HCPCS | Performed by: INTERNAL MEDICINE

## 2023-01-11 PROCEDURE — 1123F ACP DISCUSS/DSCN MKR DOCD: CPT | Performed by: INTERNAL MEDICINE

## 2023-01-11 PROCEDURE — G8417 CALC BMI ABV UP PARAM F/U: HCPCS | Performed by: INTERNAL MEDICINE

## 2023-01-11 PROCEDURE — G8400 PT W/DXA NO RESULTS DOC: HCPCS | Performed by: INTERNAL MEDICINE

## 2023-01-11 PROCEDURE — 3077F SYST BP >= 140 MM HG: CPT | Performed by: INTERNAL MEDICINE

## 2023-01-11 PROCEDURE — 3017F COLORECTAL CA SCREEN DOC REV: CPT | Performed by: INTERNAL MEDICINE

## 2023-01-11 PROCEDURE — G8427 DOCREV CUR MEDS BY ELIG CLIN: HCPCS | Performed by: INTERNAL MEDICINE

## 2023-01-11 PROCEDURE — 99213 OFFICE O/P EST LOW 20 MIN: CPT | Performed by: INTERNAL MEDICINE

## 2023-01-11 PROCEDURE — 1090F PRES/ABSN URINE INCON ASSESS: CPT | Performed by: INTERNAL MEDICINE

## 2023-01-11 PROCEDURE — 3078F DIAST BP <80 MM HG: CPT | Performed by: INTERNAL MEDICINE

## 2023-01-11 PROCEDURE — 4004F PT TOBACCO SCREEN RCVD TLK: CPT | Performed by: INTERNAL MEDICINE

## 2023-01-11 PROCEDURE — 3023F SPIROM DOC REV: CPT | Performed by: INTERNAL MEDICINE

## 2023-01-11 ASSESSMENT — COPD QUESTIONNAIRES
QUESTION5_HOMEACTIVITIES: 4
QUESTION4_WALKINCLINE: 5
QUESTION1_COUGHFREQUENCY: 5
QUESTION8_ENERGYLEVEL: 1
QUESTION2_CHESTPHLEGM: 2
CAT_TOTALSCORE: 20
QUESTION7_SLEEPQUALITY: 3
QUESTION3_CHESTTIGHTNESS: 0
QUESTION6_LEAVINGHOUSE: 0

## 2023-01-11 NOTE — LETTER
[facility] Pulmonology   [address] [facility]. [address]  Phone: [phone]  Fax: [phone]     1/11/2023    Dr. Jose Daniel Watts MD    Today had the pleasure to see our mutual patient, Carisa Contreras. My office note is attached. Please let me know if you have any questions.         Sincerely,    Chauncey Arroyo Pulmonary, [facility] Care  [phone]

## 2023-01-11 NOTE — PROGRESS NOTES
REASON FOR CONSULTATION/CC:    Chief Complaint   Patient presents with    COPD        Consult at request of   Kiara Davalos MD for      PCP: Kiara Davalos MD    HISTORY OF PRESENT ILLNESS: Jeremy Coleman is a 74y.o. year old female with a history of           Tobacco abuse. Quit cigarettes now only using Vape. Weaning vape. COPD  Now not able to afford Breztri at $160. Having a lo of cough as well. Objective:   PHYSICAL EXAM:  Blood pressure (!) 140/70, pulse 64, temperature 97.7 °F (36.5 °C), temperature source Infrared, height 5' 5\" (1.651 m), weight 185 lb (83.9 kg), SpO2 97 %, not currently breastfeeding.'      Gen: No distress. Eyes: PERRL. No sclera icterus. No conjunctival injection. ENT: No discharge. Pharynx clear. External appearance of ears and nose normal.  Neck: Trachea midline. No obvious mass. Resp: No accessory muscle use. No crackles. No wheezes. No rhonchi. CV: Regular rate. Regular rhythm. No murmur or rub. No edema. GI:    Skin: Warm, dry, normal texture and turgor. No nodule on exposed extremities. Lymph: No cervical LAD. No supraclavicular LAD. M/S: No cyanosis. No clubbing. No joint deformity. Neuro: Moves all four extremities. Psych: Oriented x 3. No anxiety. Awake. Alert. Intact judgement and insight.        Data Reviewed:     Category 1 Data points:      Last CBC  Lab Results   Component Value Date/Time    WBC 8.2 11/29/2022 09:38 AM    RBC 5.08 11/29/2022 09:38 AM    HGB 15.3 11/29/2022 09:38 AM    MCV 91.7 11/29/2022 09:38 AM     11/29/2022 09:38 AM     Last Renal  Lab Results   Component Value Date/Time     11/29/2022 09:38 AM    K 4.8 11/29/2022 09:38 AM    K 4.7 12/03/2021 04:17 PM     11/29/2022 09:38 AM    CO2 28 11/29/2022 09:38 AM    CO2 28 03/28/2022 01:27 PM    CO2 21 12/03/2021 04:17 PM    BUN 12 11/29/2022 09:38 AM    CREATININE 1.1 01/09/2023 12:55 PM    CREATININE 1.2 11/29/2022 09:38 AM    GLUCOSE 115 11/29/2022 09:38 AM    CALCIUM 9.9 11/29/2022 09:38 AM       Last ABG  POC Blood Gas: No results found for: POCPH, POCPCO2, POCPO2, POCHCO3, NBEA, PTGP9ORK  No results for input(s): PH, PCO2, PO2, HCO3, BE, O2SAT in the last 72 hours. Notes Reviewed:       Radiology Review:  Pertinent images / reports were reviewed as a part of this visit. CT Chest w/ contrast: No results found for this or any previous visit. CT Chest w/o contrast: No results found for this or any previous visit. CTPA: No results found for this or any previous visit. CXR PA/LAT: Results for orders placed during the hospital encounter of 07/13/21    XR CHEST (2 VW)    Narrative  EXAMINATION:  TWO XRAY VIEWS OF THE CHEST    7/13/2021 10:42 am    COMPARISON:  October 22, 2020    HISTORY:  ORDERING SYSTEM PROVIDED HISTORY: Bronchitis, chronic, simple ([address]  TECHNOLOGIST PROVIDED HISTORY:  Reason for Exam: bronchitis  Acuity: Acute  Type of Exam: Initial    FINDINGS:  No evidence of pneumonia, edema or other acute pulmonary process. No evidence  of acute process of the cardiac or mediastinal structures. No evidence of  pneumothorax or pleural effusion. Impression  No evidence of acute cardiopulmonary disease. CXR portable: Results for orders placed during the hospital encounter of 10/16/20    XR CHEST PORTABLE    Narrative  EXAMINATION:  ONE XRAY VIEW OF THE CHEST    10/22/2020 4:21 am    COMPARISON:  10/19/2020    HISTORY:  ORDERING SYSTEM PROVIDED HISTORY: respiratory failure  TECHNOLOGIST PROVIDED HISTORY:  Reason for exam:->respiratory failure  Reason for Exam: respiratory failure  Acuity: Acute  Type of Exam: Initial    FINDINGS:  Mediastinal silhouette appears stable. Vascular sheath has been removed  since prior study. Right lung base demonstrates improved aeration. The left lung base  demonstrates no significant change with regard to basilar airspace disease  and pleural effusion.     No acute bony abnormality appreciated. Impression  Interval removal of vascular sheath. Improved aeration at the right lung base with persistent moderate left  basilar consolidation and left-sided pleural effusion. The appearance is  nonspecific. Primary considerations include atelectasis, pneumonia and  pulmonary edema. Continued radiographic follow-up recommended. Total labs reviewed      Category 2 Data points:    Radiology Review:  Independent interpretation of aneurysm seen within chest    Category 3 Data points:    Discussed management or interpretation of test with external provider: Directly discussed with her radiologist about this interpretation. See plan              Assessment:        COPD FEV1 68% DLCO 57%  Tobacco abuse  Obesity Body mass index is 30.79 kg/m². Descending thoracic aneurysm detected November 2023    Plan:      Problem List Items Addressed This Visit       Tobacco abuse counseling     Working on quitting. Now using Vape. Plans to stop using vape as well. Aneurysm of descending thoracic aorta without rupture      Per radiology report, it has been stable since 2019. However, did not appear to be visible on 2019 image. Reviewed with radiology who also agreed that the descending thoracic aneurysm was not able to be seen in 2019 because the images did not go high enough. Disease from the base of the heart down appears to be stable from 2019. Therefore, it is plausible that this was present in 2019 and unchanged but is not able to be seen. Discussed with cardiology who will review the images and reach out to the patient. Again results with patient after visit and discussed update after confusion during visit    Spent 60 minutes with a combination of prechart review, with patient, reviewing with radiologist and speaking with cardiologist         COPD, moderate ([address]      Discussed cost.  She will call insurance to see what is going to be covered.       Given 2 samples of Trelegy. This note was transcribed using [id number] [address]. Please disregard any translational errors. [address] [facility]  [phone]     Low Dose CT (LDCT) Lung Screening criteria met:     Age 50-77(Medicare) or 50-80 ([facility])   Pack year smoking >20   Still smoking or less than 15 year since quit   No sign or symptoms of lung cancer   > 11 months since last LDCT     Risks and benefits of lung cancer screening with LDCT scans discussed:    Significance of positive screen - False-positive LDCT results often occur. 95% of all positive results do not lead to a diagnosis of cancer. Usually further imaging can resolve most false-positive results; however, some patients may require invasive procedures. Over diagnosis risk - 10% to 12% of screen-detected lung cancer cases are over diagnosed--that is, the cancer would not have been detected in the patient's lifetime without the screening. Need for follow up screens annually to continue lung cancer screening effectiveness     Risks associated with radiation from annual LDCT- Radiation exposure is about the same as for a mammogram, which is about 1/3 of the annual background radiation exposure from everyday life. Starting screening at age 54 is not likely to increase cancer risk from radiation exposure. Patients with comorbidities resulting in life expectancy of < 10 years, or that would preclude treatment of an abnormality identified on CT, should not be screened due to lack of benefit.     To obtain maximal benefit from this screening, smoking cessation and long-term abstinence from smoking is critical

## 2023-01-11 NOTE — ASSESSMENT & PLAN NOTE
Per radiology report, it has been stable since 2019. However, did not appear to be visible on 2019 image. Reviewed with radiology who also agreed that the descending thoracic aneurysm was not able to be seen in 2019 because the images did not go high enough. Disease from the base of the heart down appears to be stable from 2019. Therefore, it is plausible that this was present in 2019 and unchanged but is not able to be seen. Discussed with cardiology who will review the images and reach out to the patient.   Again results with patient after visit and discussed update after confusion during visit    Spent 60 minutes with a combination of prechart review, with patient, reviewing with radiologist and speaking with cardiologist

## 2023-01-11 NOTE — ASSESSMENT & PLAN NOTE
Discussed cost.  She will call insurance to see what is going to be covered. Given 2 samples of Trelegy.

## 2023-01-12 ENCOUNTER — TELEPHONE (OUTPATIENT)
Dept: CASE MANAGEMENT | Age: 73
End: 2023-01-12

## 2023-01-12 NOTE — TELEPHONE ENCOUNTER
CTA Chest With Contrast completed on 1/9/2023, ordering provider, Dr Nathan Davila reviewed radiology results with recommendations & office notified patient of results with recommendations.

## 2023-01-16 NOTE — TELEPHONE ENCOUNTER
Hank Arturo called in this morning stating that Dr. Evan Conley was going to call in a referral to Dr. Jon Briones and wanted to know the appointment date and time. I informed Hank Arturo that there wasn't an appointment made for Dr. Jon Briones after looking at her appointment desk. I suggested that she call Dr. Jon Briones to set up her appointment. Hank Morris wanted to speak to someone else as she felt that I was in error.     Hank Morris can be reached at: [phone]

## 2023-01-16 NOTE — TELEPHONE ENCOUNTER
Devan Jarvis,   I see that she is scheduled with Dr. Chanel Romero. Is that the plan moving forward or continuing to monitor with routine imaging?

## 2023-01-22 ENCOUNTER — TELEPHONE (OUTPATIENT)
Dept: CASE MANAGEMENT | Age: 73
End: 2023-01-22

## 2023-01-22 NOTE — TELEPHONE ENCOUNTER
CTA Chest WITH Contrast completed on 1/9/2023, radiology results with recommendations discussed with patient at [address] with Dr Dede Smith on 1/11/2023. Smoking history reviewed.

## 2023-02-02 ENCOUNTER — HOSPITAL ENCOUNTER (OUTPATIENT)
Age: 73
Discharge: HOME | End: 2023-02-02
Payer: MEDICARE

## 2023-02-02 ENCOUNTER — HOSPITAL ENCOUNTER (OUTPATIENT)
Dept: GENERAL RADIOLOGY | Age: 73
Discharge: HOME | End: 2023-02-02
Payer: MEDICARE

## 2023-02-02 DIAGNOSIS — M54.2 CERVICALGIA: ICD-10-CM

## 2023-02-02 DIAGNOSIS — R52 PAIN: ICD-10-CM

## 2023-02-02 DIAGNOSIS — M54.6 THORACIC BACK PAIN, UNSPECIFIED BACK PAIN LATERALITY, UNSPECIFIED CHRONICITY: ICD-10-CM

## 2023-02-02 PROCEDURE — 72072 X-RAY EXAM THORAC SPINE 3VWS: CPT

## 2023-02-02 PROCEDURE — 71101 X-RAY EXAM UNILAT RIBS/CHEST: CPT

## 2023-02-02 PROCEDURE — 72040 X-RAY EXAM NECK SPINE 2-3 VW: CPT

## 2023-02-22 ENCOUNTER — OFFICE VISIT (OUTPATIENT)
Dept: VASCULAR SURGERY | Age: 73
End: 2023-02-22
Payer: MEDICARE

## 2023-02-22 VITALS — BODY MASS INDEX: 30.82 KG/M2 | WEIGHT: 185 LBS | HEIGHT: 65 IN

## 2023-02-22 DIAGNOSIS — I71.23 ANEURYSM OF DESCENDING THORACIC AORTA WITHOUT RUPTURE: Primary | ICD-10-CM

## 2023-02-22 PROCEDURE — G8417 CALC BMI ABV UP PARAM F/U: HCPCS | Performed by: SURGERY

## 2023-02-22 PROCEDURE — 99214 OFFICE O/P EST MOD 30 MIN: CPT | Performed by: SURGERY

## 2023-02-22 PROCEDURE — G8400 PT W/DXA NO RESULTS DOC: HCPCS | Performed by: SURGERY

## 2023-02-22 PROCEDURE — 4004F PT TOBACCO SCREEN RCVD TLK: CPT | Performed by: SURGERY

## 2023-02-22 PROCEDURE — G8484 FLU IMMUNIZE NO ADMIN: HCPCS | Performed by: SURGERY

## 2023-02-22 PROCEDURE — G8427 DOCREV CUR MEDS BY ELIG CLIN: HCPCS | Performed by: SURGERY

## 2023-02-22 PROCEDURE — 1123F ACP DISCUSS/DSCN MKR DOCD: CPT | Performed by: SURGERY

## 2023-02-22 PROCEDURE — 1090F PRES/ABSN URINE INCON ASSESS: CPT | Performed by: SURGERY

## 2023-02-22 PROCEDURE — 3017F COLORECTAL CA SCREEN DOC REV: CPT | Performed by: SURGERY

## 2023-02-22 ASSESSMENT — ENCOUNTER SYMPTOMS
BACK PAIN: 1
EYES NEGATIVE: 1
CONSTIPATION: 1
SHORTNESS OF BREATH: 1
COUGH: 1
ALLERGIC/IMMUNOLOGIC NEGATIVE: 1

## 2023-02-22 NOTE — PROGRESS NOTES
Subjective:      Patient ID: Shasha Crowley is a 74 y.o. female. HPI Previous pt of mine S/P open AAA repair 10/16/2020 referred back to see me by Sabino Watters MD and Nora Gilford, MD for evaluation of a thoracic aneurysm seen on a screening CT scan done because of her history of tobacco abuse. Patient denies any chest pain. Has had some lower extremity arterial intervention performed by Dr. Aline You for mild claudication.   Denies any rest pain or repeated claudication    Past Medical History:   Diagnosis Date    Arthritis     osteoarthritis    Back pain     CAD (coronary artery disease)     Cellulitis     COPD (chronic obstructive pulmonary disease) ([facility])     pt unsure    Depression     Diabetes mellitus ([facility]     Enlarging abdominal aortic aneurysm (AAA)     History of femoral angiogram 4/30/2015    Hyperlipidemia     Hypertension     PVD (peripheral vascular disease) ([facility]      Past Surgical History:   Procedure Laterality Date    ABDOMINAL AORTIC ANEURYSM REPAIR N/A 10/16/2020    OPEN REPAIR ABDOMINAL AORTIC ANEURYSM performed by Claudetta Feather, MD at [address] Right 2/19/16    I and D right gluteal abscess    CARDIAC CATHETERIZATION      CHOLECYSTECTOMY      COLONOSCOPY N/A 2/7/2022    COLONOSCOPY POLYPECTOMY SNARE/COLD BIOPSY performed by Alcira Carrera MD at [address] Left 9-    excisional debridement 3.5 cm x 5 cm abscess    VASCULAR SURGERY Left     fem-tib     Allergies   Allergen Reactions    Ciprocinonide [Fluocinolone]      Pt does not know     Current Outpatient Medications   Medication Sig Dispense Refill    Budeson-Glycopyrrol-Formoterol (BREZTRI AEROSPHERE) 160-9-4.8 MCG/ACT AERO Inhale into the lungs 2 puffs BID AM and PM      albuterol sulfate (PROAIR RESPICLICK) 540 (90 Base) MCG/ACT aerosol powder inhalation Inhale 1 puff into the lungs every 4 hours as needed for Wheezing or Shortness of Breath ipratropium-albuterol (DUONEB) 0.5-2.5 (3) MG/3ML SOLN nebulizer solution Inhale 1 vial into the lungs every 4 hours      Spacer/Aero-Holding Karon LOUIE 1 Device by Does not apply route daily (Patient not taking: Reported on 12/8/2022) 1 each 0    XARELTO 2.5 MG TABS tablet TAKE 1 TABLET BY MOUTH TWICE A DAY 60 tablet 3    clopidogrel (PLAVIX) 75 MG tablet TAKE 1 TABLET BY MOUTH EVERY DAY 30 tablet 3    gabapentin (NEURONTIN) 300 MG capsule Take 1 capsule by mouth 2 times daily for 180 days. Intended supply: 30 days 180 capsule 0    acetaminophen (TYLENOL) 500 MG tablet Take 500 mg by mouth every 6 hours as needed for Pain      metFORMIN (GLUCOPHAGE) 500 MG tablet Take 500 mg by mouth as needed Pt states she takes as needed/ she goes back and forth with taking one a day \"here and there\"      amLODIPine (NORVASC) 5 MG tablet Take 5 mg by mouth 2 times daily       pravastatin (PRAVACHOL) 40 MG tablet Take 40 mg by mouth daily. No current facility-administered medications for this visit. Social History     Socioeconomic History    Marital status:      Spouse name: Not on file    Number of children: Not on file    Years of education: Not on file    Highest education level: Not on file   Occupational History    Not on file   Tobacco Use    Smoking status: Every Day     Packs/day: 0.50     Years: 45.00     Pack years: 22.50     Types: Cigarettes    Smokeless tobacco: Never    Tobacco comments:     been cutting back with electronic cig's-pt states she is vaping- but not often. She is down to 2 per day. Vaping Use    Vaping Use: Some days    Substances: Always   Substance and Sexual Activity    Alcohol use:  Yes     Alcohol/week: 3.8 standard drinks     Types: 3 Cans of beer, 1 Standard drinks or equivalent per week    Drug use: No    Sexual activity: Not Currently   Other Topics Concern    Not on file   Social History Narrative    Not on file     Social Determinants of Health     Financial Resource Strain: Not on file   Food Insecurity: Not on file   Transportation Needs: Not on file   Physical Activity: Not on file   Stress: Not on file   Social Connections: Not on file   Intimate Partner Violence: Not on file   Housing Stability: Not on file     Family History   Problem Relation Age of Onset    Heart Surgery Father          Review of Systems   Constitutional: Negative. HENT: Negative. Eyes: Negative. Respiratory:  Positive for cough and shortness of breath. Cardiovascular: Negative. Gastrointestinal:  Positive for constipation. Endocrine: Negative. Genitourinary: Negative. Musculoskeletal:  Positive for arthralgias and back pain. Skin: Negative. Allergic/Immunologic: Negative. Neurological: Negative. Hematological:  Bruises/bleeds easily. Psychiatric/Behavioral: Negative. 15 pt ROS confirmed personally by this MD.    Objective:   Physical Exam  Vitals and nursing note reviewed. Constitutional:       Appearance: She is normal weight. HENT:      Head: Normocephalic and atraumatic. Right Ear: External ear normal.      Left Ear: External ear normal.      Nose: Nose normal.      Mouth/Throat:      Mouth: Mucous membranes are moist.      Pharynx: Oropharynx is clear. Eyes:      Conjunctiva/sclera: Conjunctivae normal.      Pupils: Pupils are equal, round, and reactive to light. Cardiovascular:      Rate and Rhythm: Normal rate and regular rhythm. Heart sounds: Normal heart sounds. Pulmonary:      Effort: Pulmonary effort is normal.      Breath sounds: Rhonchi present. Abdominal:      General: Abdomen is flat. Bowel sounds are normal.      Palpations: Abdomen is soft. There is no mass. Hernia: No hernia is present. Comments: Well healed midline incision   Musculoskeletal:      Cervical back: Normal range of motion and neck supple. Right lower leg: No edema. Left lower leg: No edema. Skin:     General: Skin is warm and dry. Capillary Refill: Capillary refill takes less than 2 seconds. Neurological:      General: No focal deficit present. Mental Status: She is alert and oriented to person, place, and time. Cranial Nerves: No cranial nerve deficit. Sensory: No sensory deficit. Motor: No weakness. Coordination: Coordination normal.      Gait: Gait normal.   Psychiatric:         Mood and Affect: Mood normal.         Behavior: Behavior normal.         Thought Content: Thought content normal.         Judgment: Judgment normal.       Pulses:   R bruit  L bruit   2   carotid 2    2   brachial 2    2   radial 2    2   femoral 2    2   popliteal 1    2   posterior tibial 1    2   dorsalis pedis 1    na   bypass graft na      CTA chest 1/9/2023 - personally reviewed - agree with interpretation and sizing  Irregular atheromatous plaque is seen the descending thoracic aorta. No   intimal flap seen. When measured at a similar level of the descending   thoracic aorta, the descending thoracic aorta measures 4.0 x 3.7 cm, similar   compared to prior abdominal CT from 2019. Lucyann Push Excluded from the field of view   on prior abdominal CT for comparison are 2 focal areas of ulcerative plaque   in the descending thoracic aorta, 1 on image number 146, measuring 10 mm in   short axis. A 2nd of which is seen posteriorly on image number 166 measuring   10 mm in short axis. Lungs/pleura: There is underlying emphysema. Ground-glass nodules in the   left lung apex appear similar. Small subcentimeter ground-glass appearing   nodules are seen in the right lung apex, also similar compared to prior. A focal noncalcified nodule in the right is seen in the right lower lobe as   described on recent CT       Upper Abdomen: Low attenuation is seen throughout the liver. Focal fat is   seen along the falciform. Hypervascular nodule is seen in the dome of the liver measuring 1.3 x 1.1   cm. .  This is unchanged compared to 2020 CT scan       Adrenal glands are unremarkable. Atherosclerotic change is seen in the upper   abdominal aorta. Abdominal aorta measures 3.3 cm x 3.3 cm       Soft Tissues/Bones: Spurring is seen in the spine. Spurring is seen in the   shoulder joints           Impression   Extensive atherosclerotic plaque is seen throughout the descending thoracic   aorta, unchanged in size compared to a prior study in 2019 when a similar   level is measured. 2 areas of ulcerative plaque are seen in the descending thoracic aorta. No   intimal flap       Emphysema and pulmonary nodules as described on lung screening CT. Please   see recent chest CT for follow-up recommendations     Assessment:      1) Asymptomatic mid-descending thoracic aortic aneurysm (4.0 cm)  2) S/P open AAA repair. Doing well. 3) Bilateral SFA stenotic disease - mild symptoms S/P endovascular treatment  4) H/O tobacco abuse  5) DM  6) Hyperlipidemia  7) HTN      Plan:      Observation of TAA as size does not warrant aggressive intervention or further w/u. CT Chest 1 year with office follow up. No activity restrictions at this time. Would consider intervention if/when TAA reaches 6 cm. Explained in detail and reassured pt of safety with observation.

## 2023-02-22 NOTE — Clinical Note
Dear Dr. Adelina Booker,  I saw Kel Farley in the office today for evaluation of her documented thoracic aneurysm picked up on a lung cancer screening CT. The aneurysm measures only 4.0 cm and for this reason could be observed safely. She should undergo a repeat surveillance CT scan in 1 year. I will see her after that time for further discussions and consider repair once it reaches 6 cm. If you have any questions please feel free to contact me. Also me know if any help with any other patients in the future.   Anupama Petty

## 2023-03-08 ENCOUNTER — TELEPHONE (OUTPATIENT)
Dept: CARDIOLOGY CLINIC | Age: 73
End: 2023-03-08

## 2023-03-08 NOTE — TELEPHONE ENCOUNTER
Stephen Begum called in this morning, she moved her doppler from 4/3 to 3/20 she has a question concerning this . She can be reached at [phone].

## 2023-03-09 ENCOUNTER — NURSE ONLY (OUTPATIENT)
Dept: CARDIOLOGY CLINIC | Age: 73
End: 2023-03-09

## 2023-03-09 ENCOUNTER — HOSPITAL ENCOUNTER (OUTPATIENT)
Dept: VASCULAR LAB | Age: 73
Discharge: HOME | End: 2023-03-09
Payer: MEDICARE

## 2023-03-09 DIAGNOSIS — I73.9 PAD (PERIPHERAL ARTERY DISEASE) (HCC): ICD-10-CM

## 2023-03-09 DIAGNOSIS — I73.9 PAD (PERIPHERAL ARTERY DISEASE) (HCC): Primary | ICD-10-CM

## 2023-03-09 PROCEDURE — 93925 LOWER EXTREMITY STUDY: CPT

## 2023-03-09 NOTE — TELEPHONE ENCOUNTER
[facility] returned call and states that she got her Doppler moved to a sooner date but she dont know if she should wait. She is having increased pain. I asked her to come into the office today for a bedside KIYA. She is agreeable.

## 2023-03-09 NOTE — PROGRESS NOTES
Patient in the office due to increased leg pain. Bedside Doppler completed and showed abnormally weak pulses. Discussed with Dr Juani Carcamo and he would like her to get Doppler today. Allen Beams in Doppler is agreeable to do today at 1230.

## 2023-03-10 ENCOUNTER — TELEPHONE (OUTPATIENT)
Dept: CARDIOLOGY CLINIC | Age: 73
End: 2023-03-10

## 2023-03-10 NOTE — TELEPHONE ENCOUNTER
TEST/LAB RESULTS      WHICH RESULTS ARE YOU CALLING ABOUT:  ARTERIAL DOPPLER BILAT LOWER EXT    WHEN WAS THIS DONE:  3/9/2023     WHERE DID YOU HAVE THIS DONE:  [facility]     CALL BACK NUMBER:  [phone]

## 2023-03-10 NOTE — TELEPHONE ENCOUNTER
Dr. Juvenal Pierce, please advise in Dr. Ricardo Garzon absence as patient is calling for results, thanks. Summary        Mild KIYA reduction bilaterally. Multiphasic waveforms appreciated throughout    both lower extremities. Severe stenosis appreciated in the right superficial    femoral artery. Occluded previously placed left femoral popliteal bypass    graft with retained patentcy of native superficial femoral artery with    evidence of mild stenosis in the proximal portion.

## 2023-03-13 NOTE — TELEPHONE ENCOUNTER
Contact patient who states she personally received a call from Dr. Reece Eng on Friday who recommended the same; continue aggressive medical therapy and walking as tolerated. She has a f/u in May, instructed to call with any increasing symptoms.

## 2023-04-12 ENCOUNTER — TELEPHONE (OUTPATIENT)
Dept: CARDIOTHORACIC SURGERY | Age: 73
End: 2023-04-12

## 2023-04-13 ENCOUNTER — TELEPHONE (OUTPATIENT)
Dept: CARDIOLOGY CLINIC | Age: 73
End: 2023-04-13

## 2023-04-13 DIAGNOSIS — I10 ESSENTIAL HYPERTENSION: ICD-10-CM

## 2023-04-13 DIAGNOSIS — I73.9 PAD (PERIPHERAL ARTERY DISEASE) (HCC): Primary | ICD-10-CM

## 2023-04-18 ENCOUNTER — TELEPHONE (OUTPATIENT)
Dept: ORTHOPEDIC SURGERY | Age: 73
End: 2023-04-18

## 2023-04-18 ENCOUNTER — OFFICE VISIT (OUTPATIENT)
Dept: ORTHOPEDIC SURGERY | Age: 73
End: 2023-04-18
Payer: MEDICARE

## 2023-04-18 VITALS — RESPIRATION RATE: 18 BRPM | BODY MASS INDEX: 31.65 KG/M2 | HEIGHT: 65 IN | WEIGHT: 190 LBS

## 2023-04-18 DIAGNOSIS — M25.552 LEFT HIP PAIN: Primary | ICD-10-CM

## 2023-04-18 DIAGNOSIS — M16.12 ARTHRITIS OF LEFT HIP: ICD-10-CM

## 2023-04-18 PROCEDURE — 4004F PT TOBACCO SCREEN RCVD TLK: CPT | Performed by: PHYSICIAN ASSISTANT

## 2023-04-18 PROCEDURE — 1123F ACP DISCUSS/DSCN MKR DOCD: CPT | Performed by: PHYSICIAN ASSISTANT

## 2023-04-18 PROCEDURE — G8400 PT W/DXA NO RESULTS DOC: HCPCS | Performed by: PHYSICIAN ASSISTANT

## 2023-04-18 PROCEDURE — G8417 CALC BMI ABV UP PARAM F/U: HCPCS | Performed by: PHYSICIAN ASSISTANT

## 2023-04-18 PROCEDURE — G8427 DOCREV CUR MEDS BY ELIG CLIN: HCPCS | Performed by: PHYSICIAN ASSISTANT

## 2023-04-18 PROCEDURE — 1090F PRES/ABSN URINE INCON ASSESS: CPT | Performed by: PHYSICIAN ASSISTANT

## 2023-04-18 PROCEDURE — 99212 OFFICE O/P EST SF 10 MIN: CPT | Performed by: PHYSICIAN ASSISTANT

## 2023-04-18 PROCEDURE — 3017F COLORECTAL CA SCREEN DOC REV: CPT | Performed by: PHYSICIAN ASSISTANT

## 2023-04-18 RX ORDER — RIVAROXABAN 2.5 MG/1
2.5 TABLET, FILM COATED ORAL 2 TIMES DAILY
Qty: 60 TABLET | Refills: 3 | COMMUNITY
Start: 2023-04-18

## 2023-04-18 NOTE — TELEPHONE ENCOUNTER
Left message for patient. Per office visit on 2/22/23 CTA chest in 1 year with office visit to follow for results.    Patient had last CTA chest in January 2023- so she is due January 2024

## 2023-04-18 NOTE — PROGRESS NOTES
Subjective:      Patient ID: Susy Castellano is a 74 y.o. female who is here for follow up evaluation of left hip arthritis. She underwent left hip intra-articular injection on 1 year ago with great relief up until recently. She has been experiencing left anterior thigh and groin pain as well as left buttock pain. Pain with ambulation. Symptoms improved with rest.  She also has a history of lumbar radicular pain. Review Of Systems:   She denies fevers or chills, bladder or bowel changes, saddle anesthesia. Past Medical History:   Diagnosis Date    Arthritis     osteoarthritis    Back pain     CAD (coronary artery disease)     Cellulitis     COPD (chronic obstructive pulmonary disease) ([facility])     pt unsure    Depression     Diabetes mellitus ([address]     Enlarging abdominal aortic aneurysm (AAA) ([address]     History of femoral angiogram 4/30/2015    Hyperlipidemia     Hypertension     PVD (peripheral vascular disease) ([address]        Family History   Problem Relation Age of Onset    Heart Surgery Father        Past Surgical History:   Procedure Laterality Date    ABDOMINAL AORTIC ANEURYSM REPAIR N/A 10/16/2020    OPEN REPAIR ABDOMINAL AORTIC ANEURYSM performed by Chester Killian MD at [address] Right 2/19/16    I and D right gluteal abscess    CARDIAC CATHETERIZATION      CHOLECYSTECTOMY      COLONOSCOPY N/A 2/7/2022    COLONOSCOPY POLYPECTOMY SNARE/COLD BIOPSY performed by Jose Perez MD at [address] Left 9-    excisional debridement 3.5 cm x 5 cm abscess    VASCULAR SURGERY Left     fem-tib       Social History     Occupational History    Not on file   Tobacco Use    Smoking status: Every Day     Packs/day: 0.50     Years: 45.00     Pack years: 22.50     Types: Cigarettes    Smokeless tobacco: Never    Tobacco comments:     been cutting back with electronic cig's-pt states she is vaping- but not often.  She is down to 2 per

## 2023-04-20 ENCOUNTER — HOSPITAL ENCOUNTER (OUTPATIENT)
Dept: GENERAL RADIOLOGY | Age: 73
Discharge: HOME | End: 2023-04-20
Payer: MEDICARE

## 2023-04-20 ENCOUNTER — OFFICE VISIT (OUTPATIENT)
Dept: ORTHOPEDIC SURGERY | Age: 73
End: 2023-04-20

## 2023-04-20 DIAGNOSIS — M16.12 ARTHRITIS OF LEFT HIP: ICD-10-CM

## 2023-04-20 DIAGNOSIS — M16.12 PRIMARY OSTEOARTHRITIS OF LEFT HIP: Primary | ICD-10-CM

## 2023-04-20 PROCEDURE — 77002 NEEDLE LOCALIZATION BY XRAY: CPT

## 2023-04-20 PROCEDURE — 6360000002 HC RX W HCPCS

## 2023-04-20 PROCEDURE — 20610 DRAIN/INJ JOINT/BURSA W/O US: CPT

## 2023-04-20 PROCEDURE — 2500000003 HC RX 250 WO HCPCS

## 2023-04-20 NOTE — TELEPHONE ENCOUNTER
Called and spoke to Stefanie to schedule her peripheral. I had a date for 5/17/23. Stefanie stated that she is getting a cortisone shot in her upper leg due to arthritis. Stefanie is wanting to see if the shot will help first, if it doesn't help, she will call me back to get her peripheral scheduled. Discussed with Cal Martin.

## 2023-04-20 NOTE — TELEPHONE ENCOUNTER
The morning of your procedure you will park in the hospital parking lot and report directly to the cath lab to check in.     Pre-Procedure Instructions   You will need to fast for at least 8 hours prior to procedure. No caffeine the morning of. You will need to hold your anticoagulation, Xarelto for 2 days prior. Hold all diabetic medications including, Metfomin. If you take Lantus/Levemir only take ½ your normal dose the evening before. All other medications can be taken in the morning with sips of water. Do not use any lotions, creams or perfume the morning of procedure. Pre-procedure lab work will need to be completed 5-7 days prior to procedure. Please have a responsible adult to drive you home after procedure. We advise you have someone to stay with you for 24 hours following procedure for precautionary measures. Depending on procedure you may require an overnight stay. Cath lab will provide you with all post procedure instructions. If you have any questions regarding the procedure itself or medications, please call [phone] and ask to speak with a nurse.

## 2023-04-24 ENCOUNTER — TELEPHONE (OUTPATIENT)
Dept: CARDIOLOGY CLINIC | Age: 73
End: 2023-04-24

## 2023-04-24 NOTE — TELEPHONE ENCOUNTER
Leo Jansen called in this morning, She states she is scheduled for an angiogram on may 17th She was wondering if that needs to be done in May or maybe be pushed back until June.      She states her upper leg pain was from arthritis and a shot that she had received      Would like to talk with Scott Jason and hear her recommendations as to have it in May or push it back until June

## 2023-04-27 NOTE — TELEPHONE ENCOUNTER
Denice Kemp called me back this morning stating that the shot did work, but she isn't sure how important the peripheral is. She is wanting to know how bad the blockage is and if Dr. Jerson Stack thinks the procedure should be schedule sooner than later.

## 2023-04-27 NOTE — TELEPHONE ENCOUNTER
Called and lvm for Serafin Michael to call me back regarding her peripheral.   In the telephone encounter on 4/13, she didn't want to schedule at that time and wanted to see first if her shot worked.

## 2023-04-28 NOTE — TELEPHONE ENCOUNTER
Called and spoke to patient and she is willing to proceed with scheduling. Sent to  Kristopher to schedule.

## 2023-05-18 DIAGNOSIS — I10 ESSENTIAL HYPERTENSION: ICD-10-CM

## 2023-05-18 DIAGNOSIS — I73.9 PAD (PERIPHERAL ARTERY DISEASE) (HCC): ICD-10-CM

## 2023-05-18 LAB
ANION GAP SERPL CALCULATED.3IONS-SCNC: 14 MMOL/L (ref 3–16)
BUN SERPL-MCNC: 11 MG/DL (ref 7–20)
CALCIUM SERPL-MCNC: 9.8 MG/DL (ref 8.3–10.6)
CHLORIDE SERPL-SCNC: 102 MMOL/L (ref 99–110)
CO2 SERPL-SCNC: 25 MMOL/L (ref 21–32)
CREAT SERPL-MCNC: 1.1 MG/DL (ref 0.6–1.2)
DEPRECATED RDW RBC AUTO: 15.1 % (ref 12.4–15.4)
GFR SERPLBLD CREATININE-BSD FMLA CKD-EPI: 52 ML/MIN/{1.73_M2}
GLUCOSE SERPL-MCNC: 116 MG/DL (ref 70–99)
HCT VFR BLD AUTO: 45 % (ref 36–48)
HGB BLD-MCNC: 14.8 G/DL (ref 12–16)
MCH RBC QN AUTO: 30.5 PG (ref 26–34)
MCHC RBC AUTO-ENTMCNC: 32.9 G/DL (ref 31–36)
MCV RBC AUTO: 92.6 FL (ref 80–100)
PLATELET # BLD AUTO: 224 K/UL (ref 135–450)
PMV BLD AUTO: 8.4 FL (ref 5–10.5)
POTASSIUM SERPL-SCNC: 4.5 MMOL/L (ref 3.5–5.1)
RBC # BLD AUTO: 4.86 M/UL (ref 4–5.2)
SODIUM SERPL-SCNC: 141 MMOL/L (ref 136–145)
WBC # BLD AUTO: 9.7 K/UL (ref 4–11)

## 2023-05-24 ENCOUNTER — HOSPITAL ENCOUNTER (OUTPATIENT)
Dept: CARDIAC CATH/INVASIVE PROCEDURES | Age: 73
Discharge: HOME | End: 2023-05-24
Payer: MEDICARE

## 2023-05-24 VITALS
OXYGEN SATURATION: 94 % | HEART RATE: 81 BPM | DIASTOLIC BLOOD PRESSURE: 77 MMHG | HEIGHT: 65 IN | RESPIRATION RATE: 17 BRPM | SYSTOLIC BLOOD PRESSURE: 167 MMHG | BODY MASS INDEX: 30.82 KG/M2 | WEIGHT: 185 LBS | TEMPERATURE: 97.2 F

## 2023-05-24 LAB — POC ACT LR: 250 SEC

## 2023-05-24 PROCEDURE — 2500000003 HC RX 250 WO HCPCS

## 2023-05-24 PROCEDURE — 99152 MOD SED SAME PHYS/QHP 5/>YRS: CPT

## 2023-05-24 PROCEDURE — C1894 INTRO/SHEATH, NON-LASER: HCPCS

## 2023-05-24 PROCEDURE — 2709999900 HC NON-CHARGEABLE SUPPLY

## 2023-05-24 PROCEDURE — 75716 ARTERY X-RAYS ARMS/LEGS: CPT

## 2023-05-24 PROCEDURE — 6370000000 HC RX 637 (ALT 250 FOR IP)

## 2023-05-24 PROCEDURE — C1725 CATH, TRANSLUMIN NON-LASER: HCPCS

## 2023-05-24 PROCEDURE — 37224 HC FEM POP TERRITORY PLASTY: CPT

## 2023-05-24 PROCEDURE — 99153 MOD SED SAME PHYS/QHP EA: CPT

## 2023-05-24 PROCEDURE — 6360000002 HC RX W HCPCS

## 2023-05-24 PROCEDURE — 6360000004 HC RX CONTRAST MEDICATION: Performed by: INTERNAL MEDICINE

## 2023-05-24 PROCEDURE — C1769 GUIDE WIRE: HCPCS

## 2023-05-24 PROCEDURE — 85347 COAGULATION TIME ACTIVATED: CPT

## 2023-05-24 RX ORDER — ONDANSETRON 2 MG/ML
4 INJECTION INTRAMUSCULAR; INTRAVENOUS EVERY 6 HOURS PRN
Status: DISCONTINUED | OUTPATIENT
Start: 2023-05-24 | End: 2023-05-25 | Stop reason: HOSPADM

## 2023-05-24 RX ORDER — SODIUM CHLORIDE 0.9 % (FLUSH) 0.9 %
5-40 SYRINGE (ML) INJECTION EVERY 12 HOURS SCHEDULED
Status: DISCONTINUED | OUTPATIENT
Start: 2023-05-24 | End: 2023-05-24 | Stop reason: SDUPTHER

## 2023-05-24 RX ORDER — SODIUM CHLORIDE 9 MG/ML
INJECTION, SOLUTION INTRAVENOUS CONTINUOUS
Status: DISCONTINUED | OUTPATIENT
Start: 2023-05-24 | End: 2023-05-25 | Stop reason: HOSPADM

## 2023-05-24 RX ORDER — SODIUM CHLORIDE 9 MG/ML
INJECTION, SOLUTION INTRAVENOUS PRN
Status: DISCONTINUED | OUTPATIENT
Start: 2023-05-24 | End: 2023-05-25 | Stop reason: HOSPADM

## 2023-05-24 RX ORDER — SODIUM CHLORIDE 9 MG/ML
INJECTION, SOLUTION INTRAVENOUS PRN
Status: DISCONTINUED | OUTPATIENT
Start: 2023-05-24 | End: 2023-05-24 | Stop reason: SDUPTHER

## 2023-05-24 RX ORDER — SODIUM CHLORIDE 0.9 % (FLUSH) 0.9 %
5-40 SYRINGE (ML) INJECTION EVERY 12 HOURS SCHEDULED
Status: DISCONTINUED | OUTPATIENT
Start: 2023-05-24 | End: 2023-05-25 | Stop reason: HOSPADM

## 2023-05-24 RX ORDER — SODIUM CHLORIDE 0.9 % (FLUSH) 0.9 %
5-40 SYRINGE (ML) INJECTION PRN
Status: DISCONTINUED | OUTPATIENT
Start: 2023-05-24 | End: 2023-05-25 | Stop reason: HOSPADM

## 2023-05-24 RX ORDER — ACETAMINOPHEN 325 MG/1
650 TABLET ORAL EVERY 4 HOURS PRN
Status: DISCONTINUED | OUTPATIENT
Start: 2023-05-24 | End: 2023-05-25 | Stop reason: HOSPADM

## 2023-05-24 RX ORDER — SODIUM CHLORIDE 0.9 % (FLUSH) 0.9 %
5-40 SYRINGE (ML) INJECTION PRN
Status: DISCONTINUED | OUTPATIENT
Start: 2023-05-24 | End: 2023-05-24 | Stop reason: SDUPTHER

## 2023-05-24 RX ADMIN — IOPAMIDOL 270 ML: 755 INJECTION, SOLUTION INTRAVENOUS at 13:50

## 2023-05-24 NOTE — DISCHARGE INSTRUCTIONS
CARDIAC ANGIOGRAM (LEFT HEART CATH) - RADIAL ACCESS    Care of your puncture site:  Remove clear bandage 24 hours after the procedure. May shower in 24 hours  Inspect the site daily and gently clean using soap and water. Dry thoroughly and apply a Band-Aid. Normal Observations:  Soreness or tenderness which may last one week. Mild oozing from the incision site. Possible bruising that could last 2 weeks. Activity:  You may resume driving 24 hours following the procedure. You may resume normal activity in 3 days or after the wound heals. Avoid lifting more than 10 pounds for 3 days with affected arm. Do not make important / legal decisions within 24 hours after procedure. Nutrition:  Regular diet or ***. Drink at least 8 to 10 glasses of decaffeinated, non-alcoholic fluid for the next 24 hours to flush the x-ray dye used for your angiogram out of your body. Call your doctor immediately if your condition worsens, for any other concerns, for a follow-up appointment or if you experience any of the following:  Significant bleeding that does not stop after 10 minutes of applying firm pressure on the puncture site. Increased swelling of the wrist.  Unusual pain, numbness, or tingling of the wrist/arm. Any signs of infection such as: redness, yellow drainage at the site, swelling or pain. IF experiencing any recurrent chest pain, arm or jaw pain, shortness of breath,sweating,nausea & vomiting, lighteheadedness or sudden weak. Other Instructions:  Hold Metformin or Metformin containing drugs for 48 hours after procedure.

## 2023-05-24 NOTE — H&P
femoral/proximal popliteal artery is occluded. Three vessel runoff is noted to the foot. Left Impression   The left ankle/brachial index is . 96 (the DP is 136 and the PT is 164 mmHg). The common femoral artery demonstrates biphasic flow, possibly indicating   aorto-iliac inflow disease. There is an occluded left fem-pop PTFE graft noted. Elevated velocities at the proximal superficial femoral artery indicate a >50%   stenosis by velocity criteria (velocity went from 88 to 229 cm/s). Elevated velocities at the mid popliteal artery indicate a >50% stenosis by   velocity criteria (velocity went from 76 to 298 cm/s). Three vessel runoff is noted to the foot. As compared to the previous study of 8/24/21 the right KIYA has dropped from   . 81 down to .59 today and the left KIYA has increased (post balloon   angioplasty) up to . 96 today. BLE arterial Doppler 10/21/2022  Impressions   Right Impression   Right KIYA 0.7. This is consistent with moderate PAD at rest.   Occlusion of the right distal superficial femoral artery. The flow reconstitutes at the proximal popliteal artery. Monophasic flow noted in the right popliteal artery and distally. Left Impression   Left KIYA 0.93. This is consistent with no PAD at rest.   Elevated velocity of the popliteal artery suggest a greater than 50% stenosis. The majority of the waveforms are multiphasic throughout the left lower   extremity. Elevated velocity of the popliteal artery suggest a greater than 50% stenosis. Occlusion of the left fem-popliteal arterial bypass graft was noted. BLE arterial Doppler 3/9/2023  Impressions   Right Impression   There is an KIYA of 0.80 on the right. This is consistent with mild arterial   insufficiency at rest.   , . The majority of the waveforms are multiphasic throughout the right lower   extremity.    Ultrasound images of the right lower extremity reveal moderate calcific plaque   formation

## 2023-05-24 NOTE — PROCEDURES
[address] [place] [address]                            CARDIAC CATHETERIZATION    PATIENT NAME: Tala Burger                   :        1950  MED REC NO:   [id number]                          ROOM:  ACCOUNT NO:   [de-identified]                           ADMIT DATE: 2023  PROVIDER:     Navi Juarez MD    DATE OF PROCEDURE:  2023    PROCEDURES PERFORMED:  1. Abdominal aortogram.  2.  Right and left selective angiography. 3.  Balloon angioplasty of right superficial femoral artery with a 5 and  6 mL balloon catheter. 4.  Balloon angioplasty of left superficial femoral artery using a 5 mL  balloon catheter. INDICATION FOR PROCEDURE:  Severe peripheral arterial disease with  severe claudication. Informed consent obtained. ASA is II. Mallampati score is II. No complications. Estimated blood loss less than 30 mL. PROCEDURE IN DETAIL:  Left radial access obtained. Pigtail was advanced  and performed abdominal aortogram after positioning pigtail in the  abdominal aorta. Glidewire Advantage placed in the right superficial femoral artery. The  sheath removed and 6-Canadian long peripheral sheath advanced into the  right common femoral artery. Dilator was removed. Sheath was flushed. Then, we advanced an 0.018 Glidewire Advantage into right superficial  femoral artery. We performed balloon angioplasty using a first a 5  followed by a 6 mL balloon catheter of the right superficial femoral  artery. Final angiography performed. Then, the sheath was positioned  in the left common femoral artery and a balloon angioplasty of the left  superficial femoral artery was performed with a 5 mL balloon catheter. Final angiography was performed. All catheters, wires, and sheaths were  removed. Radial band applied to achieve hemostasis. ANGIOGRAPHIC FINDINGS:  1. Abdominal aorta is patent.   2.  Right

## 2023-06-28 ENCOUNTER — TELEPHONE (OUTPATIENT)
Dept: CARDIOLOGY CLINIC | Age: 73
End: 2023-06-28

## 2023-08-17 RX ORDER — RIVAROXABAN 2.5 MG/1
TABLET, FILM COATED ORAL
Qty: 180 TABLET | Refills: 3 | Status: SHIPPED | OUTPATIENT
Start: 2023-08-17

## 2023-08-17 NOTE — TELEPHONE ENCOUNTER
Script sent as patient call that it had  not been routed to her pharmacy about a week ago. continue w/ home anti-HTN meds  -c/w amlodipine 5mg qd  -c/w metoprolol succinate 75mg qd

## 2023-09-19 NOTE — PROGRESS NOTES
[facility]   Cardiology Office Visit      Date: 9/20/2023  CC:    Chief Complaint   Patient presents with    Follow-up     S/p peripheral angiogram       HPI:   I had the privilege of visiting Frank Kimball in the office. Frank Kimball is a 74 y.o. female  Presents today for follow up : for PAD. She had peripheral angiogram 5/24/23. She reports she has been feeling better since the procedure. Occasional bilateral claudication pain. She works part time as a -does not wear compression when working. She reports she continues to smoke although she has cut down significantly and has been vaping. Patient denies lightheadedness, dizziness, shortness of breath, chest pain, palpitations, orthopnea, edema, presyncope or syncope. Past Medical History:   Diagnosis Date    Arthritis     osteoarthritis    Back pain     CAD (coronary artery disease)     Cellulitis     COPD (chronic obstructive pulmonary disease) ([facility])     pt unsure    Depression     Diabetes mellitus ([address])     Enlarging abdominal aortic aneurysm (AAA) ([address])     History of femoral angiogram 4/30/2015    Hyperlipidemia     Hypertension     PVD (peripheral vascular disease) ([address])         Past Surgical History:   Procedure Laterality Date    ABDOMINAL AORTIC ANEURYSM REPAIR N/A 10/16/2020    OPEN REPAIR ABDOMINAL AORTIC ANEURYSM performed by Pato Hunt MD at [address] [place] [address] N Right 2/19/16    I and D right gluteal abscess    CARDIAC CATHETERIZATION      CHOLECYSTECTOMY      COLONOSCOPY N/A 2/7/2022    COLONOSCOPY POLYPECTOMY SNARE/COLD BIOPSY performed by Usha Josue MD at [address] [place] [address] Left 9-    excisional debridement 3.5 cm x 5 cm abscess    VASCULAR SURGERY Left     fem-tib       Allergies:   Allergies   Allergen Reactions    Ciprocinonide [Fluocinolone]      Pt does not know       Medication:  Current Outpatient Medications   Medication

## 2023-09-20 ENCOUNTER — OFFICE VISIT (OUTPATIENT)
Dept: CARDIOLOGY CLINIC | Age: 73
End: 2023-09-20
Payer: MEDICARE

## 2023-09-20 VITALS
HEIGHT: 65 IN | DIASTOLIC BLOOD PRESSURE: 64 MMHG | WEIGHT: 188 LBS | SYSTOLIC BLOOD PRESSURE: 142 MMHG | HEART RATE: 90 BPM | OXYGEN SATURATION: 96 % | BODY MASS INDEX: 31.32 KG/M2

## 2023-09-20 DIAGNOSIS — I73.9 PAD (PERIPHERAL ARTERY DISEASE) (HCC): Primary | ICD-10-CM

## 2023-09-20 DIAGNOSIS — I73.9 CLAUDICATION (HCC): ICD-10-CM

## 2023-09-20 DIAGNOSIS — R91.8 PULMONARY NODULES: ICD-10-CM

## 2023-09-20 DIAGNOSIS — Z72.0 TOBACCO ABUSE: ICD-10-CM

## 2023-09-20 DIAGNOSIS — I10 PRIMARY HYPERTENSION: ICD-10-CM

## 2023-09-20 DIAGNOSIS — R01.1 MURMUR: ICD-10-CM

## 2023-09-20 PROCEDURE — G8427 DOCREV CUR MEDS BY ELIG CLIN: HCPCS | Performed by: NURSE PRACTITIONER

## 2023-09-20 PROCEDURE — 99214 OFFICE O/P EST MOD 30 MIN: CPT | Performed by: NURSE PRACTITIONER

## 2023-09-20 PROCEDURE — 3078F DIAST BP <80 MM HG: CPT | Performed by: NURSE PRACTITIONER

## 2023-09-20 PROCEDURE — 3077F SYST BP >= 140 MM HG: CPT | Performed by: NURSE PRACTITIONER

## 2023-09-20 PROCEDURE — G8417 CALC BMI ABV UP PARAM F/U: HCPCS | Performed by: NURSE PRACTITIONER

## 2023-09-20 PROCEDURE — 1090F PRES/ABSN URINE INCON ASSESS: CPT | Performed by: NURSE PRACTITIONER

## 2023-09-20 PROCEDURE — G8400 PT W/DXA NO RESULTS DOC: HCPCS | Performed by: NURSE PRACTITIONER

## 2023-09-20 PROCEDURE — 1123F ACP DISCUSS/DSCN MKR DOCD: CPT | Performed by: NURSE PRACTITIONER

## 2023-09-20 PROCEDURE — 3017F COLORECTAL CA SCREEN DOC REV: CPT | Performed by: NURSE PRACTITIONER

## 2023-09-20 PROCEDURE — 93000 ELECTROCARDIOGRAM COMPLETE: CPT | Performed by: NURSE PRACTITIONER

## 2023-09-20 PROCEDURE — 4004F PT TOBACCO SCREEN RCVD TLK: CPT | Performed by: NURSE PRACTITIONER

## 2023-09-20 RX ORDER — FLUTICASONE FUROATE, UMECLIDINIUM BROMIDE AND VILANTEROL TRIFENATATE 100; 62.5; 25 UG/1; UG/1; UG/1
POWDER RESPIRATORY (INHALATION)
COMMUNITY
Start: 2023-08-03

## 2023-09-20 ASSESSMENT — ENCOUNTER SYMPTOMS
CHEST TIGHTNESS: 0
WHEEZING: 0
APNEA: 0
SHORTNESS OF BREATH: 1
COUGH: 0

## 2023-09-20 NOTE — PATIENT INSTRUCTIONS
Compression socks 10-20 mmHg wear when working     Continue to increase walking     Ask the pulmonologist about repeat imaging for a chest CT  And to discuss CT from 1/2023

## 2023-11-02 ENCOUNTER — TELEPHONE (OUTPATIENT)
Dept: CARDIOLOGY CLINIC | Age: 73
End: 2023-11-02

## 2023-11-02 NOTE — TELEPHONE ENCOUNTER
Pt is having a problem finding her XARELTO 2.5mg She [facility] [address] in [facility] and also [facility]. Please call and help her please.       Angelia Bumpers # [phone]

## 2023-11-02 NOTE — TELEPHONE ENCOUNTER
I called [facility] on [address], pharmacy states patient has enough refills for Xarelto 2.5 mg till Jan/2024    I called patient left message that [facility] her.  If any questions please give us a call back

## 2023-11-09 ENCOUNTER — TELEPHONE (OUTPATIENT)
Dept: CARDIOLOGY CLINIC | Age: 73
End: 2023-11-09

## 2023-11-09 NOTE — TELEPHONE ENCOUNTER
Fax received on pts TIER exception that has been DENIED.    The medication was DENIED; DENIAL letter uploaded to MEDIA.    If you want an APPEAL; please note in this encounter what new information you would like to APPEAL with.  Once complete route back to [place] POOL.    If this requires a response please respond to the pool ( P [facility] MEDICATION PRE-AUTH).      Thank you please advise patient.

## 2023-11-09 NOTE — TELEPHONE ENCOUNTER
They are unable to approve request because patients medication is covered under Medicare part D benefit.

## 2023-11-27 ENCOUNTER — TELEPHONE (OUTPATIENT)
Dept: CARDIOLOGY CLINIC | Age: 73
End: 2023-11-27

## 2023-11-27 NOTE — TELEPHONE ENCOUNTER
Yes to the echo, please inform her , it will be a while before we can get her in to see Tiffany Frank

## 2023-11-27 NOTE — TELEPHONE ENCOUNTER
Olman Erickson called in to cancel her appt. BRUCE/Karon on 12/15/23 because she wants to see Dr. Donivan Sever. She also stated if Jessie Acevedo still wants her to have the ECHO done on 12/27/23?       Olman Erickson can be reached at [phone]

## 2023-11-29 ENCOUNTER — HOSPITAL ENCOUNTER (OUTPATIENT)
Dept: VASCULAR LAB | Age: 73
Discharge: HOME | End: 2023-11-29
Payer: MEDICARE

## 2023-11-29 DIAGNOSIS — I73.9 CLAUDICATION (HCC): ICD-10-CM

## 2023-11-29 DIAGNOSIS — I73.9 PAD (PERIPHERAL ARTERY DISEASE) (HCC): ICD-10-CM

## 2023-11-29 PROCEDURE — 93925 LOWER EXTREMITY STUDY: CPT

## 2023-12-15 ENCOUNTER — OFFICE VISIT (OUTPATIENT)
Dept: CARDIOLOGY CLINIC | Age: 73
End: 2023-12-15
Payer: MEDICARE

## 2023-12-15 VITALS
DIASTOLIC BLOOD PRESSURE: 80 MMHG | WEIGHT: 188 LBS | SYSTOLIC BLOOD PRESSURE: 138 MMHG | BODY MASS INDEX: 31.28 KG/M2 | OXYGEN SATURATION: 97 % | HEART RATE: 91 BPM

## 2023-12-15 DIAGNOSIS — I73.9 CLAUDICATION (HCC): ICD-10-CM

## 2023-12-15 DIAGNOSIS — Z72.0 TOBACCO USE: ICD-10-CM

## 2023-12-15 DIAGNOSIS — I10 ESSENTIAL HYPERTENSION: ICD-10-CM

## 2023-12-15 DIAGNOSIS — I73.9 PAD (PERIPHERAL ARTERY DISEASE) (HCC): Primary | ICD-10-CM

## 2023-12-15 DIAGNOSIS — E78.2 MIXED HYPERLIPIDEMIA: ICD-10-CM

## 2023-12-15 DIAGNOSIS — R01.1 MURMUR: ICD-10-CM

## 2023-12-15 PROCEDURE — 3017F COLORECTAL CA SCREEN DOC REV: CPT | Performed by: INTERNAL MEDICINE

## 2023-12-15 PROCEDURE — G8417 CALC BMI ABV UP PARAM F/U: HCPCS | Performed by: INTERNAL MEDICINE

## 2023-12-15 PROCEDURE — G8484 FLU IMMUNIZE NO ADMIN: HCPCS | Performed by: INTERNAL MEDICINE

## 2023-12-15 PROCEDURE — 3075F SYST BP GE 130 - 139MM HG: CPT | Performed by: INTERNAL MEDICINE

## 2023-12-15 PROCEDURE — 1090F PRES/ABSN URINE INCON ASSESS: CPT | Performed by: INTERNAL MEDICINE

## 2023-12-15 PROCEDURE — 99213 OFFICE O/P EST LOW 20 MIN: CPT | Performed by: INTERNAL MEDICINE

## 2023-12-15 PROCEDURE — 4004F PT TOBACCO SCREEN RCVD TLK: CPT | Performed by: INTERNAL MEDICINE

## 2023-12-15 PROCEDURE — 1123F ACP DISCUSS/DSCN MKR DOCD: CPT | Performed by: INTERNAL MEDICINE

## 2023-12-15 PROCEDURE — G8400 PT W/DXA NO RESULTS DOC: HCPCS | Performed by: INTERNAL MEDICINE

## 2023-12-15 PROCEDURE — G8427 DOCREV CUR MEDS BY ELIG CLIN: HCPCS | Performed by: INTERNAL MEDICINE

## 2023-12-15 PROCEDURE — 3079F DIAST BP 80-89 MM HG: CPT | Performed by: INTERNAL MEDICINE

## 2023-12-15 NOTE — PROGRESS NOTES
[facility]  Cardiology Progress Note    Jessica Rust  1950    December 15, 2023      Reason for Referral: PAD     CC: \"I'm a  and I am trying to walk\"    Subjective:     History of Present Illness:    Jessica Rust is a 74 y.o. patient with a PMH significant for PAD with known right SFA occlusion hyperlipidemia, hypertension and type 2 diabetes who presents today for management of PAD. She was admitted to [facility] with worsening claudication and underwent a peripheral angiogram. She had EKOS and thrombectomy of left femoropopliteal graft 10/2019. She most recently underwent Balloon angioplasty of left superficial femoral artery 5/2023. Analy Mercado states she is doing well overall. Patient denies exertional chest pain/pressure, dyspnea at rest, worsening PIMENTEL, PND, orthopnea, lightheadedness, weight changes, changes in LE edema, and syncope. Admits to occasional bilateral claudication. Past Medical History:   has a past medical history of Arthritis, Back pain, CAD (coronary artery disease), Cellulitis, COPD (chronic obstructive pulmonary disease) ([address]), Depression, Diabetes mellitus ([address]), Enlarging abdominal aortic aneurysm (AAA) ([address]), History of femoral angiogram, Hyperlipidemia, Hypertension, and PVD (peripheral vascular disease) ([address] Surgical History:   has a past surgical history that includes Cholecystectomy; hernia repair; other surgical history (Left, 9-); vascular surgery (Left); Abscess Drainage (Right, 2/19/16); Cardiac catheterization; Abdominal aortic aneurysm repair (N/A, 10/16/2020); and Colonoscopy (N/A, 2/7/2022). Social History:   reports that she has been smoking cigarettes. She has a 22.50 pack-year smoking history. She has never used smokeless tobacco. She reports current alcohol use of about 3.8 standard drinks of alcohol per week. She reports that she does not use drugs.      Family History:  family history includes Heart Surgery in her
Gastrointestinal:   Soft, non-tender, bowel sounds active. Liver and Spleen  Masses   Musculoskeletal: No muscle wasting  Back  Gait   Extremities: Extremities normal, atraumatic. No cyanosis or edema. No cyanosis clubbing       Skin: Inspection and palpation performed, no rashes or lesions. Pysch: Normal mood and affect. Alert and oriented to time place person   Neurologic: Normal gross motor and sensory exam.       Labs     All labs have been reviewed    Lab Results   Component Value Date/Time    WBC 9.7 05/18/2023 01:43 PM    RBC 4.86 05/18/2023 01:43 PM    HGB 14.8 05/18/2023 01:43 PM    HCT 45.0 05/18/2023 01:43 PM    MCV 92.6 05/18/2023 01:43 PM    RDW 15.1 05/18/2023 01:43 PM     05/18/2023 01:43 PM     Lab Results   Component Value Date/Time     05/18/2023 01:43 PM    K 4.5 05/18/2023 01:43 PM    K 4.7 12/03/2021 04:17 PM     05/18/2023 01:43 PM    CO2 25 05/18/2023 01:43 PM    BUN 11 05/18/2023 01:43 PM    CREATININE 1.1 05/18/2023 01:43 PM    GFRAA 59 03/28/2022 01:27 PM    GFRAA >60 03/15/2011 12:43 AM    AGRATIO 1.2 12/03/2021 04:17 PM    LABGLOM 52 05/18/2023 01:43 PM    GLUCOSE 116 05/18/2023 01:43 PM    PROT 7.0 12/03/2021 04:17 PM    PROT 6.5 03/15/2011 12:43 AM    CALCIUM 9.8 05/18/2023 01:43 PM    BILITOT 0.3 12/03/2021 04:17 PM    ALKPHOS 96 12/03/2021 04:17 PM    AST 26 12/03/2021 04:17 PM    ALT 16 12/03/2021 04:17 PM     No results found for: \"PTINR\"  No results found for: \"LABA1C\"  Lab Results   Component Value Date    TROPONINI <0.01 04/15/2014       Cardiac, Vascular and Imaging Data all Personally Reviewed in Detail by Myself      EKG Interpretation: 2/25/2022 Sinus rhythm      7/8/2022 Sinus rhythm     Stress test   9/15/2020   Pharmacological Stress/MPI Results:        1. Technically a satisfactory study. 2. Normal pharmacological stress portion of the study. 3. No evidence of Ischemia by Myocardial Perfusion Imaging.     4. Gated Study shows normal LV size and

## 2023-12-27 ENCOUNTER — HOSPITAL ENCOUNTER (OUTPATIENT)
Dept: NON INVASIVE DIAGNOSTICS | Age: 73
Discharge: HOME | End: 2023-12-27
Payer: MEDICARE

## 2023-12-27 DIAGNOSIS — R01.1 MURMUR: ICD-10-CM

## 2023-12-27 PROCEDURE — 93306 TTE W/DOPPLER COMPLETE: CPT

## 2024-01-31 ENCOUNTER — HOSPITAL ENCOUNTER (OUTPATIENT)
Dept: CT IMAGING | Age: 74
Discharge: HOME | End: 2024-01-31
Attending: SURGERY
Payer: MEDICARE

## 2024-01-31 DIAGNOSIS — I71.23 ANEURYSM OF DESCENDING THORACIC AORTA WITHOUT RUPTURE (HCC): ICD-10-CM

## 2024-01-31 LAB
PERFORMED ON: ABNORMAL
POC CREATININE: 1.1 MG/DL (ref 0.6–1.2)
POC SAMPLE TYPE: ABNORMAL

## 2024-01-31 PROCEDURE — 82565 ASSAY OF CREATININE: CPT

## 2024-01-31 PROCEDURE — 6360000004 HC RX CONTRAST MEDICATION: Performed by: SURGERY

## 2024-01-31 PROCEDURE — 71275 CT ANGIOGRAPHY CHEST: CPT

## 2024-01-31 RX ADMIN — IOPAMIDOL 75 ML: 755 INJECTION, SOLUTION INTRAVENOUS at 08:33

## 2024-02-02 ENCOUNTER — TELEPHONE (OUTPATIENT)
Dept: CARDIOTHORACIC SURGERY | Age: 74
End: 2024-02-02

## 2024-02-28 ENCOUNTER — OFFICE VISIT (OUTPATIENT)
Dept: VASCULAR SURGERY | Age: 74
End: 2024-02-28
Payer: MEDICARE

## 2024-02-28 VITALS
SYSTOLIC BLOOD PRESSURE: 174 MMHG | WEIGHT: 185 LBS | HEART RATE: 84 BPM | BODY MASS INDEX: 30.82 KG/M2 | HEIGHT: 65 IN | DIASTOLIC BLOOD PRESSURE: 101 MMHG

## 2024-02-28 DIAGNOSIS — I71.23 ANEURYSM OF DESCENDING THORACIC AORTA WITHOUT RUPTURE (HCC): Primary | ICD-10-CM

## 2024-02-28 PROCEDURE — G8484 FLU IMMUNIZE NO ADMIN: HCPCS | Performed by: SURGERY

## 2024-02-28 PROCEDURE — 99213 OFFICE O/P EST LOW 20 MIN: CPT | Performed by: SURGERY

## 2024-02-28 PROCEDURE — 3017F COLORECTAL CA SCREEN DOC REV: CPT | Performed by: SURGERY

## 2024-02-28 PROCEDURE — 4004F PT TOBACCO SCREEN RCVD TLK: CPT | Performed by: SURGERY

## 2024-02-28 PROCEDURE — 3077F SYST BP >= 140 MM HG: CPT | Performed by: SURGERY

## 2024-02-28 PROCEDURE — G8427 DOCREV CUR MEDS BY ELIG CLIN: HCPCS | Performed by: SURGERY

## 2024-02-28 PROCEDURE — G8417 CALC BMI ABV UP PARAM F/U: HCPCS | Performed by: SURGERY

## 2024-02-28 PROCEDURE — 3080F DIAST BP >= 90 MM HG: CPT | Performed by: SURGERY

## 2024-02-28 PROCEDURE — G8400 PT W/DXA NO RESULTS DOC: HCPCS | Performed by: SURGERY

## 2024-02-28 PROCEDURE — 1090F PRES/ABSN URINE INCON ASSESS: CPT | Performed by: SURGERY

## 2024-02-28 PROCEDURE — 1123F ACP DISCUSS/DSCN MKR DOCD: CPT | Performed by: SURGERY

## 2024-02-28 ASSESSMENT — ENCOUNTER SYMPTOMS
COUGH: 1
BACK PAIN: 1
CONSTIPATION: 1
ALLERGIC/IMMUNOLOGIC NEGATIVE: 1
SHORTNESS OF BREATH: 1
EYES NEGATIVE: 1

## 2024-02-28 NOTE — PROGRESS NOTES
ground-glass nodule in the left upper lobe.     Other stable nonacute findings as above    Assessment:      1) Asymptomatic mid-descending thoracic aortic aneurysm (4.1 cm)  2) S/P open AAA repair. Doing well.  3) Bilateral SFA stenotic disease - mild symptoms S/P endovascular treatment  4) H/O tobacco abuse  5) DM  6) Hyperlipidemia  7) HTN      Plan:      Observation of TAA as size does not warrant aggressive intervention or further w/u.  CT Chest 1 year with office follow up.  No activity restrictions at this time.  Would consider intervention if/when TAA reaches 6 cm.  Explained in detail and reassured pt of safety with observation.  Patient continues to smoke and was advised to stop. Counseling and support resources were offered to the patient. The patient will consider.

## 2024-06-29 ENCOUNTER — HOSPITAL ENCOUNTER (OUTPATIENT)
Dept: GENERAL RADIOLOGY | Age: 74
Discharge: HOME | End: 2024-06-29
Attending: SPECIALIST
Payer: MEDICARE

## 2024-06-29 ENCOUNTER — HOSPITAL ENCOUNTER (OUTPATIENT)
Age: 74
Discharge: HOME | End: 2024-06-29
Payer: MEDICARE

## 2024-06-29 PROCEDURE — 71046 X-RAY EXAM CHEST 2 VIEWS: CPT

## 2024-08-05 ENCOUNTER — HOSPITAL ENCOUNTER (OUTPATIENT)
Dept: VASCULAR LAB | Age: 74
Discharge: HOME | End: 2024-08-07
Attending: INTERNAL MEDICINE
Payer: MEDICARE

## 2024-08-05 DIAGNOSIS — I73.9 PAD (PERIPHERAL ARTERY DISEASE) (HCC): ICD-10-CM

## 2024-08-05 LAB
VAS LEFT ABI: 0.85
VAS LEFT ARM BP: 168 MMHG
VAS LEFT ATA DIST PSV: 0 CM/S
VAS LEFT ATA MID PSV: 15 CM/S
VAS LEFT ATA PROX PSV: 49 CM/S
VAS LEFT CFA DIST PSV: 119.2 CM/S
VAS LEFT CFA PROX PSV: 99.8 CM/S
VAS LEFT DORSALIS PEDIS BP: 0 MMHG
VAS LEFT PERONEAL MID PSV: 83.8 CM/S
VAS LEFT PFA PROX PSV: 84 CM/S
VAS LEFT POP A DIST PSV: 55.2 CM/S
VAS LEFT POP A PROX PSV: 79.4 CM/S
VAS LEFT POP A PROX VEL RATIO: 0.63
VAS LEFT PTA BP: 142 MMHG
VAS LEFT PTA DIST PSV: 93 CM/S
VAS LEFT PTA MID PSV: 79 CM/S
VAS LEFT PTA PROX PSV: 66 CM/S
VAS LEFT SFA DIST PSV: 126.4 CM/S
VAS LEFT SFA DIST VEL RATIO: 1.32
VAS LEFT SFA MID PSV: 95.4 CM/S
VAS LEFT SFA MID VEL RATIO: 0.71
VAS LEFT SFA PROX PSV: 133.8 CM/S
VAS LEFT SFA PROX VEL RATIO: 1.12
VAS RIGHT ABI: 0.89
VAS RIGHT ARM BP: 168 MMHG
VAS RIGHT ATA DIST PSV: 60 CM/S
VAS RIGHT ATA MID PSV: 76 CM/S
VAS RIGHT ATA PROX PSV: 69 CM/S
VAS RIGHT CFA DIST PSV: 132.1 CM/S
VAS RIGHT CFA PROX PSV: 132.2 CM/S
VAS RIGHT DORSALIS PEDIS BP: 142 MMHG
VAS RIGHT PERONEAL MID PSV: 56 CM/S
VAS RIGHT PFA PROX PSV: 101 CM/S
VAS RIGHT POP A DIST PSV: 97.1 CM/S
VAS RIGHT POP A PROX PSV: 99.3 CM/S
VAS RIGHT POP A PROX VEL RATIO: 0.87
VAS RIGHT PTA BP: 150 MMHG
VAS RIGHT PTA DIST PSV: 91 CM/S
VAS RIGHT PTA MID PSV: 93 CM/S
VAS RIGHT PTA PROX PSV: 73 CM/S
VAS RIGHT SFA DIST PSV: 114.6 CM/S
VAS RIGHT SFA DIST VEL RATIO: 0.39
VAS RIGHT SFA MID PSV: 291.2 CM/S
VAS RIGHT SFA MID VEL RATIO: 2.2
VAS RIGHT SFA PROX PSV: 132 CM/S
VAS RIGHT SFA PROX VEL RATIO: 1

## 2024-08-05 PROCEDURE — 93925 LOWER EXTREMITY STUDY: CPT

## 2024-08-05 PROCEDURE — 93925 LOWER EXTREMITY STUDY: CPT | Performed by: INTERNAL MEDICINE

## 2024-09-25 NOTE — PROGRESS NOTES
History of present illness:   Ms. Jacob Matta is a pleasant 66 y.o. female kindly referred by Emily Caldwell MD for consultation regarding her LBP, left buttock pain and left anterior thigh pain. She states her pain began suddenly approximately 3 weeks ago. She thought this was a vascular issue. She did undergo an angiogram which showed no blockage. She was prescribed Percocet for pain after angiogram which helped with her pain complaint. Her pain has steadily improved since angiogram.  Not sure if it is the Percocet which is helping. .   She rates her back pain 5/10 VAS, buttock pain 5/10 VAS and leg pain in the anterior thigh 8/10 VAS. She describes the pain as aching, throbbing. She denies numbness and tingling lower extremities. She denies weakness of her left or right leg. She denies bowel or bladder dysfunction and saddle anesthesia. She states she can sit for a maximum of 60 minutes and stand for a maximum 10-15 minutes. The pain occasionally disrupts her sleep. She has tried Percocet recently. She was also prescribed diclofenac which she started middle of last week. She does have a history of back issues for which she was treated at [facility]. She does not recall epidural injections or prior lumbar surgery. Past medical history:  Her past medical history has been reviewed. Past Medical History:   Diagnosis Date    Arthritis     osteoarthritis    Back pain     CAD (coronary artery disease)     Cellulitis     COPD [facility]chronic obstructive pulmonary disease) [facility]     pt unsure    Depression     Diabetes mellitus ([facility]     Enlarging abdominal aortic aneurysm (AAA) ([facility])     History of femoral angiogram 4/30/2015    Hyperlipidemia     Hypertension     PVD (peripheral vascular disease) ([facility])        Her past surgical history has been reviewed.   Past Surgical History:   Procedure Laterality Date    ABDOMINAL AORTIC ANEURYSM REPAIR N/A 10/16/2020 OPEN REPAIR ABDOMINAL AORTIC ANEURYSM performed by Prema Porras MD at [facility] Right 16    I and D right gluteal abscess    CARDIAC CATHETERIZATION      CHOLECYSTECTOMY      COLONOSCOPY N/A 2022    COLONOSCOPY POLYPECTOMY SNARE/COLD BIOPSY performed by Siobhan Marks MD at [facility] 49 HISTORY Left 2015    excisional debridement 3.5 cm x 5 cm abscess    VASCULAR SURGERY Left     fem-tib         Her medications and allergies were reviewed. Current Outpatient Medications   Medication Sig Dispense Refill    rivaroxaban (XARELTO) 2.5 MG TABS tablet Take 1 tablet by mouth 2 times daily 60 tablet 3    clopidogrel (PLAVIX) 75 MG tablet TAKE 1 TABLET BY MOUTH EVERY DAY (Patient taking differently: Dr. Eugene Every to stop five days prior) 30 tablet 3    gabapentin (NEURONTIN) 300 MG capsule Take 1 capsule by mouth 2 times daily for 180 days. Intended supply: 30 days 180 capsule 0    acetaminophen (TYLENOL) 500 MG tablet Take 500 mg by mouth every 6 hours as needed for Pain      metFORMIN (GLUCOPHAGE) 500 MG tablet Take 500 mg by mouth as needed Pt states she takes as needed/ she goes back and forth with taking one a day \"here and there\"      amLODIPine (NORVASC) 5 MG tablet Take 5 mg by mouth 2 times daily       pravastatin (PRAVACHOL) 40 MG tablet Take 40 mg by mouth daily. No current facility-administered medications for this visit. Her social history has been reviewed.   Social History     Occupational History    Not on file   Tobacco Use    Smoking status: Current Every Day Smoker     Packs/day: 0.50     Years: 45.00     Pack years: 22.50     Types: Cigarettes     Last attempt to quit: 10/26/2019     Years since quittin.4    Smokeless tobacco: Never Used    Tobacco comment: been cutting back with electronic cig's-pt states she is vaping- but not often   Vaping Use    Vaping Use: Some days    Substances: Always   Substance and Sexual Activity    Alcohol use: Yes     Alcohol/week: 3.8 standard drinks     Types: 3 Cans of beer, 1 Standard drinks or equivalent per week    Drug use: No    Sexual activity: Not Currently         Her family history has been reviewed. Family History   Problem Relation Age of Onset    Heart Surgery Father          Review of Systems:  I have reviewed the clinically relevant past medical history, medications, allergies, family history, social history, and 13 point Review of Systems from the patient's recent history form & documented any details relevant to today's presenting complaints in the history above. The patient's self-reported past medical history, medications, allergies, family history, social history, and Review of Systems form from today's date have been scanned into the chart under the \"Media\" tab. Patient's review of symptoms was reviewed and is significant for back pain and negative for recent weight loss, fatigue, chills, visual disturbances, blood in stool or urine, recent infection. Physical examination:  Ms. Ellamae Holter Evenson's most recent vitals:  Vitals  Resp: 20  Height: 5' 5\" (165.1 cm)  Weight: 185 lb (83.9 kg)  Body mass index is 30.79 kg/m². General exam:  She is well-developed and well-nourished, is in obvious discomfort and alert and oriented to person, place, and time. She demonstrates appropriate mood and affect. Her skin is warm and dry. Her gait is normal and she walks heel to toe without significant limp or instability. Back:  She stands with slight lumbar flexion. Her lumbar flexion, extension and lateral bending are milely reduced with minimal pain. She has mild tenderness over her lumbar spine without obvious muscle spasm. The skin over her lumbar spine is normal without a surgical scar. Lower extremities:  She has 5/5 motor strength of bilateral lower extremities.    She has a negative straight leg raise, bilaterally. Deep tendon reflexes at knees and achilles are absent. Sensation is intact to light touch L3 to S1 bilaterally. She has no clonus. Hip range of motion is diminished on the left. Stinchfield test is positive on the left. Abdomen:  Non-tender and non-distended. No rash. Imaging:  X rays was not obtained in the office today. [facility] Rays from [facility] or an outside facility:  EXAMINATION:   TWO XRAY VIEWS OF THE LEFT HIP       4/5/2022 2:31 pm       COMPARISON:   None.       HISTORY:   ORDERING SYSTEM PROVIDED HISTORY: Left hip pain   TECHNOLOGIST PROVIDED HISTORY:   Reason for Exam: ddd       FINDINGS:   There is mild joint space narrowing symmetrically in both hips.  No fracture   or dislocation is seen.  The pelvis is intact.           Impression   Mild osteoarthritic changes in both hips with no acute abnormality seen.            EXAMINATION:   2 XRAY VIEWS OF THE LEFT KNEE       4/5/2022 2:31 pm       COMPARISON:   11/07/2009       HISTORY:   ORDERING SYSTEM PROVIDED HISTORY: DDD (degenerative disc disease), lumbar   TECHNOLOGIST PROVIDED HISTORY:   Reason for Exam: ddd       FINDINGS:   The soft tissues are unremarkable.  There is no fracture or dislocation. No   focal destructive osseous lesion.  No radiopaque foreign body is identified.    There is no joint effusion.           Impression   No acute osseous abnormality        EXAMINATION:   THREE XRAY VIEWS OF THE LUMBAR SPINE       4/5/2022 2:31 pm       COMPARISON:   11/07/2019       HISTORY:   ORDERING SYSTEM PROVIDED HISTORY: DDD (degenerative disc disease), lumbar   TECHNOLOGIST PROVIDED HISTORY:   Reason for Exam: ddd       FINDINGS:   There is moderate disc space narrowing throughout with prominent osteophytes   throughout which is unchanged.  No fracture or subluxation is seen. Lucetta Record   are moderate sclerotic changes of the facets throughout with no pars defects.           Impression   Moderate degenerative disc changes throughout and moderate osteoarthritic   changes of facets with no acute abnormality seen.       Diffuse osteopenia.             Diagnosis:      ICD-10-CM    1. Lumbar radicular pain  M54.16    2. Arthritis of left hip  M16.12 FL ARTHR/ASP/INJ MAJOR JT/BURSA LT WO US          Assessment/ Plan:  She has a history of chronic low back pain. X-rays show lumbar spondylosis and mild to moderate left hip arthritis. Clinically I believe most of her symptoms are coming from her left hip arthritis. I had an extensive discussion with Ms. Jeralene Aase and/or family regarding the natural history, etiology, and long term consequences of her condition. I have presented reasonable alternatives to the patient's proposed care, treatment, and services. Risks and benefits of the treatment options also reviewed in detail. I have outlined a treatment plan with them. She has had full opportunity to ask her questions. I have answered them all to her satisfaction. I feel that Ms. Jeralene Aase understands our discussion today. Discussed at length conservative treatment of hip symptoms/arthritis, according to AAOS Clinical Practice Guidelines:  1)  Recommend oral or topical NSAIDs to reduce pain and swelling. 2)  Recommend acetaminophen to reduce pain. 3)  Oral narcotics, including tramadol, result in a significant increase of adverse events and are not effective at improving pain or function for treatment of osteoarthritis of the knee. 4)  Recommend maintaining weight in a healthy range to reduce stresses on the knee, particularly the patellofemoral compartment which sees up to 6x body weight. 5)  Home exercise program, focusing on strengthening, low impact aerobic exercises, and neuromuscular education. 6)  Intra-articular corticosteroid injections are an option. Informed patient corticosteroid injections can be performed every 3-4 months as needed. 7)  Cane use can improve pain and function. Although not specifically listed in the CPGs, ice therapy and topical patches such as Salonpas are good options as well. Surgical option, hip arthroplasty discussed. New Medications prescribed today-no new medications today. She is on diclofenac. OTC NSAIDS discussed. 1. The most common side effects from NSAIDs are stomachaches, heartburn, and nausea. NSAIDs may irritate the stomach lining. If the medicine upsets your stomach, you can try taking it with food. But if that doesn't help, talk with your doctor to make sure it's not a more serious problem, such as a stomach ulcer or bleeding in the stomach or intestines. 2. Using NSAIDs may:  ? Lead to high blood pressure. ? Make symptoms of heart failure worse. ? Raise the risk of heart attack, stroke, kidney damage, and skin reactions. 3. Your risks are greater if you take NSAIDs at higher doses or for longer than the label says. People who are older than 72 or who have heart, stomach, or intestinal disease have a higher risk for problems. Procedures- I have recommended intra articular injection of the left hip for both diagnostic and therapeutic purpose. This will be performed under fluoroscopy to be sure the injection is indeed intra articular. This will be performed by Dr [facility]ARTE [facility] and scheduled in the near future. The risks and benefits were discussed in detail today. All questions were answered. Romana Ling verbally consents to the procedure of intra-articular steroid hip injection. Follow up- 3-4 weeks. She was instructed to call us emergently if she begins to experience bowel or bladder dysfunction, saddle anesthesia, increasing muscle weakness, or onset/ worsening leg symptoms. Michelle Goins PA-C   Senior Physician Assistant   [facility]                                         Disclaimer:   This note was generated with use of a verbal recognition program [facility]) and an attempt was made to check for errors. It is possible that there are still dictated errors within this office note. If so, please bring any significant errors to my attention for an addendum. All efforts were made to ensure that this office note is accurate. No cyanosis, no pallor, no jaundice, no rash

## 2024-11-27 ENCOUNTER — TELEPHONE (OUTPATIENT)
Dept: VASCULAR SURGERY | Age: 74
End: 2024-11-27

## 2024-11-27 DIAGNOSIS — I71.23 ANEURYSM OF DESCENDING THORACIC AORTA WITHOUT RUPTURE (HCC): Primary | ICD-10-CM

## 2024-11-27 NOTE — TELEPHONE ENCOUNTER
Pt is needing to schedule yearly CTA chest w contrast for Dr. Pulliam. Pt's last CT scan was 1/31/24. Please place order for pt- central scheduling number provided to get it scheduled once order is placed.

## 2024-12-06 ENCOUNTER — TELEPHONE (OUTPATIENT)
Dept: ADMINISTRATIVE | Age: 74
End: 2024-12-06

## 2024-12-06 NOTE — TELEPHONE ENCOUNTER
Looks like this was sent to insurance for a tier reduction, this has been denied.  A copy has been scanned into the chart

## 2024-12-11 NOTE — TELEPHONE ENCOUNTER
Called and spoke to patient and let her know that I will discuss with Dr Rodriguez about the medications and call her back with his recommendations.

## 2024-12-11 NOTE — TELEPHONE ENCOUNTER
Patient returned call [facility] her PCP Dr Dailey gave samples of Eliquis 5 mg BID- patient has not started taking yet.    [facility] she will take the Eliquis samples until Jan/25 when her insurance changes.   [facility] she may have her PCP take over the medication.    [facility] she can't afford the Xarelto 2.5 mg and would like to switch to Eliquis if okay.     [facility] doesn't want to fill out patient assistance at this time.

## 2024-12-12 RX ORDER — ASPIRIN 81 MG/1
81 TABLET ORAL DAILY
Qty: 90 TABLET | Refills: 0 | Status: SHIPPED | OUTPATIENT
Start: 2024-12-12

## 2024-12-12 NOTE — TELEPHONE ENCOUNTER
Discussed with Dr Rodriguez and he does not want her to take Elquis in place of the Xarelto 2.5 mg BID. She is okay to take Asa and Plavix daily.  She wants to switch back to the Xarelto in January once she can afford it. I let her know that we can send a new Rx in January she will just need to call but for now to take Asa 81 mg daily and Plavix 75 mg daily.

## 2024-12-18 ENCOUNTER — OFFICE VISIT (OUTPATIENT)
Dept: VASCULAR SURGERY | Age: 74
End: 2024-12-18
Payer: MEDICARE

## 2024-12-18 VITALS
DIASTOLIC BLOOD PRESSURE: 94 MMHG | SYSTOLIC BLOOD PRESSURE: 170 MMHG | WEIGHT: 194 LBS | HEART RATE: 102 BPM | OXYGEN SATURATION: 94 % | BODY MASS INDEX: 32.32 KG/M2 | HEIGHT: 65 IN

## 2024-12-18 DIAGNOSIS — I73.9 PAD (PERIPHERAL ARTERY DISEASE) (HCC): ICD-10-CM

## 2024-12-18 DIAGNOSIS — I71.23 ANEURYSM OF DESCENDING THORACIC AORTA WITHOUT RUPTURE (HCC): Primary | ICD-10-CM

## 2024-12-18 PROCEDURE — G8400 PT W/DXA NO RESULTS DOC: HCPCS | Performed by: SURGERY

## 2024-12-18 PROCEDURE — G8484 FLU IMMUNIZE NO ADMIN: HCPCS | Performed by: SURGERY

## 2024-12-18 PROCEDURE — 4004F PT TOBACCO SCREEN RCVD TLK: CPT | Performed by: SURGERY

## 2024-12-18 PROCEDURE — 1123F ACP DISCUSS/DSCN MKR DOCD: CPT | Performed by: SURGERY

## 2024-12-18 PROCEDURE — 99213 OFFICE O/P EST LOW 20 MIN: CPT | Performed by: SURGERY

## 2024-12-18 PROCEDURE — 1159F MED LIST DOCD IN RCRD: CPT | Performed by: SURGERY

## 2024-12-18 PROCEDURE — 3080F DIAST BP >= 90 MM HG: CPT | Performed by: SURGERY

## 2024-12-18 PROCEDURE — 3077F SYST BP >= 140 MM HG: CPT | Performed by: SURGERY

## 2024-12-18 PROCEDURE — G8427 DOCREV CUR MEDS BY ELIG CLIN: HCPCS | Performed by: SURGERY

## 2024-12-18 PROCEDURE — G8417 CALC BMI ABV UP PARAM F/U: HCPCS | Performed by: SURGERY

## 2024-12-18 PROCEDURE — 1090F PRES/ABSN URINE INCON ASSESS: CPT | Performed by: SURGERY

## 2024-12-18 RX ORDER — DUPILUMAB 300 MG/2ML
INJECTION, SOLUTION SUBCUTANEOUS
COMMUNITY
Start: 2024-11-26

## 2024-12-18 RX ORDER — RIVAROXABAN 2.5 MG/1
TABLET, FILM COATED ORAL
COMMUNITY

## 2024-12-18 RX ORDER — TRIAMCINOLONE ACETONIDE 1 MG/G
CREAM TOPICAL
COMMUNITY
Start: 2024-12-03

## 2024-12-18 ASSESSMENT — ENCOUNTER SYMPTOMS
BACK PAIN: 1
EYES NEGATIVE: 1
ALLERGIC/IMMUNOLOGIC NEGATIVE: 1
SHORTNESS OF BREATH: 1
COUGH: 1
CONSTIPATION: 1

## 2024-12-18 NOTE — PROGRESS NOTES
Subjective:      Patient ID: Beverley Ling is a 74 y.o. female.    HPI Previous pt of mine S/P open AAA repair 10/16/2020 referred back to see me by Ad Rodriguez MD and Marito Dailey MD for evaluation of a thoracic aneurysm seen on a screening CT scan done because of her history of tobacco abuse.  Patient denies any chest pain.  Has had some lower extremity arterial intervention performed by Dr. Rodriguez for mild claudication.  Denies any rest pain or repeated claudication. S/P L fempop 2014 by [facility]. C/O nocturnal L calf cramping without rest pain, foot wounds or gangrene. No claudication reported since last seen.    Past Medical History:   Diagnosis Date    Arthritis     osteoarthritis    Back pain     CAD (coronary artery disease)     Cellulitis     COPD (chronic obstructive pulmonary disease) (HCC)     pt unsure    Depression     Diabetes mellitus (HCC)     Enlarging abdominal aortic aneurysm (AAA) (HCC)     History of femoral angiogram 4/30/2015    Hyperlipidemia     Hypertension     PVD (peripheral vascular disease) (HCC)      Past Surgical History:   Procedure Laterality Date    ABDOMINAL AORTIC ANEURYSM REPAIR N/A 10/16/2020    OPEN REPAIR ABDOMINAL AORTIC ANEURYSM performed by Valdez Pulliam MD at [facility] OR    ABSCESS DRAINAGE Right 2/19/16    I and D right gluteal abscess    CARDIAC CATHETERIZATION      CHOLECYSTECTOMY      COLONOSCOPY N/A 2/7/2022    COLONOSCOPY POLYPECTOMY SNARE/COLD BIOPSY performed by Waqar Blake MD at [facility] ENDOSCOPY    HERNIA REPAIR      OTHER SURGICAL HISTORY Left 9-    excisional debridement 3.5 cm x 5 cm abscess    VASCULAR SURGERY Left     fem-tib     No Known Allergies    Current Outpatient Medications   Medication Sig Dispense Refill    DUPIXENT 300 MG/2ML SOAJ injection 300mg SQ q2 weeks      rivaroxaban (XARELTO) 2.5 MG TABS tablet Oral for 30 Days      triamcinolone (KENALOG) 0.1 % cream Apply to rash BID      aspirin 81 MG EC tablet Take 1 tablet by mouth

## 2025-01-13 ENCOUNTER — HOSPITAL ENCOUNTER (OUTPATIENT)
Dept: CT IMAGING | Age: 75
Discharge: HOME | End: 2025-01-13
Attending: SURGERY
Payer: MEDICARE

## 2025-01-13 DIAGNOSIS — I71.23 ANEURYSM OF DESCENDING THORACIC AORTA WITHOUT RUPTURE (HCC): ICD-10-CM

## 2025-01-13 LAB
PERFORMED ON: ABNORMAL
POC CREATININE: 1.1 MG/DL (ref 0.6–1.2)
POC SAMPLE TYPE: ABNORMAL

## 2025-01-13 PROCEDURE — 6360000004 HC RX CONTRAST MEDICATION: Performed by: SURGERY

## 2025-01-13 PROCEDURE — 82565 ASSAY OF CREATININE: CPT

## 2025-01-13 PROCEDURE — 71275 CT ANGIOGRAPHY CHEST: CPT

## 2025-01-13 RX ORDER — IOPAMIDOL 755 MG/ML
75 INJECTION, SOLUTION INTRAVASCULAR
Status: COMPLETED | OUTPATIENT
Start: 2025-01-13 | End: 2025-01-13

## 2025-01-13 RX ADMIN — IOPAMIDOL 75 ML: 755 INJECTION, SOLUTION INTRAVENOUS at 12:20

## 2025-01-31 ENCOUNTER — TELEPHONE (OUTPATIENT)
Dept: VASCULAR SURGERY | Age: 75
End: 2025-01-31

## 2025-01-31 DIAGNOSIS — I71.23 ANEURYSM OF DESCENDING THORACIC AORTA WITHOUT RUPTURE (HCC): Primary | ICD-10-CM

## 2025-01-31 NOTE — TELEPHONE ENCOUNTER
----- Message from Dr. Valdez Pulliam MD sent at 1/29/2025  8:31 AM EST -----  Please call. No change in thoracic aneurysm size. Repeat CTA chest in 6 months with f/u OV afterwards. Thanks.    Me  ----- Message -----  From: Dai Pressley Incoming Radiology Results From [facility]  Sent: 1/13/2025   1:37 PM EST  To: Valdez Pulliam MD

## 2025-01-31 NOTE — TELEPHONE ENCOUNTER
Per Dr. Pulliam, pt advised no change in aneurysm. Repeat CTA w OV in 6 months. Pt states she was talking to Jenny to coordinate an ultrasound in March and she is unsure why the CTA is in 6 months because usually it is every year. I advised the pt I will check with Dr. Pulliam on Monday about why the scan is 6 months and not 1 year and will speak with Jenny to make sure we are getting everything scheduled as needed. Pt voiced understanding.

## 2025-03-07 NOTE — TELEPHONE ENCOUNTER
Called pt and LVM. Per Dr. Pulliam, 1 year is okay for CTA chest and OV instead of 6 months. Dr. Pulliam states everything is stable from her last scan.

## 2025-03-20 ENCOUNTER — TRANSCRIBE ORDERS (OUTPATIENT)
Dept: ADMINISTRATIVE | Age: 75
End: 2025-03-20

## 2025-03-20 DIAGNOSIS — R06.00 DYSPNEA, UNSPECIFIED TYPE: ICD-10-CM

## 2025-03-20 DIAGNOSIS — J42 CHRONIC BRONCHITIS, UNSPECIFIED CHRONIC BRONCHITIS TYPE (HCC): Primary | ICD-10-CM

## 2025-03-27 ENCOUNTER — HOSPITAL ENCOUNTER (OUTPATIENT)
Dept: PULMONOLOGY | Age: 75
Discharge: HOME | End: 2025-03-27
Payer: MEDICARE

## 2025-03-27 DIAGNOSIS — R06.00 DYSPNEA, UNSPECIFIED TYPE: ICD-10-CM

## 2025-03-27 DIAGNOSIS — J42 CHRONIC BRONCHITIS, UNSPECIFIED CHRONIC BRONCHITIS TYPE (HCC): ICD-10-CM

## 2025-03-27 LAB
DLCO %PRED: 91 %
DLCO PRED: NORMAL
DLCO/VA %PRED: NORMAL
DLCO/VA PRED: NORMAL
DLCO/VA: NORMAL
DLCO: NORMAL
EXPIRATORY TIME-POST: NORMAL
EXPIRATORY TIME: NORMAL
FEF 25-75 %CHNG: NORMAL
FEF 25-75 POST %PRED: NORMAL
FEF 25-75% %PRED-PRE: NORMAL
FEF 25-75% PRED: NORMAL
FEF 25-75-POST: NORMAL
FEF 25-75-PRE: NORMAL
FEV1 %PRED-POST: 83 %
FEV1 %PRED-PRE: 78 %
FEV1 PRED: NORMAL
FEV1-POST: NORMAL
FEV1-PRE: NORMAL
FEV1/FVC %PRED-POST: NORMAL
FEV1/FVC %PRED-PRE: NORMAL
FEV1/FVC PRED: NORMAL
FEV1/FVC-POST: 71 %
FEV1/FVC-PRE: 65 %
FVC %PRED-POST: 91 L
FVC %PRED-PRE: 92 %
FVC PRED: NORMAL
FVC-POST: 2.4 L
FVC-PRE: 2.45 L
GAW %PRED: NORMAL
GAW PRED: NORMAL
GAW: NORMAL
IC PRE %PRED: NORMAL
IC PRED: NORMAL
IC: NORMAL
MEP: NORMAL
MIP: NORMAL
MVV %PRED-PRE: NORMAL
MVV PRED: NORMAL
MVV-PRE: NORMAL
PEF %PRED-POST: NORMAL
PEF %PRED-PRE: NORMAL
PEF PRED: NORMAL
PEF%CHNG: NORMAL
PEF-POST: NORMAL
PEF-PRE: NORMAL
RAW %PRED: 213 %
RAW PRED: NORMAL
RAW: 3.97 CMH2O/L/S
RV PRE %PRED: 130 %
RV PRED: NORMAL
RV: NORMAL
SVC %PRED: NORMAL
SVC PRED: NORMAL
SVC: NORMAL
TLC PRE %PRED: 100 %
TLC PRED: NORMAL
TLC: NORMAL
VA %PRED: NORMAL
VA PRED: NORMAL
VA: NORMAL
VTG %PRED: 106 %
VTG PRED: NORMAL
VTG: NORMAL

## 2025-03-27 PROCEDURE — 94664 DEMO&/EVAL PT USE INHALER: CPT

## 2025-03-27 PROCEDURE — 94060 EVALUATION OF WHEEZING: CPT

## 2025-03-27 PROCEDURE — 94729 DIFFUSING CAPACITY: CPT

## 2025-03-27 PROCEDURE — 94726 PLETHYSMOGRAPHY LUNG VOLUMES: CPT

## 2025-03-27 PROCEDURE — 6370000000 HC RX 637 (ALT 250 FOR IP): Performed by: SPECIALIST

## 2025-03-27 PROCEDURE — 94640 AIRWAY INHALATION TREATMENT: CPT

## 2025-03-27 RX ORDER — ALBUTEROL SULFATE 90 UG/1
4 INHALANT RESPIRATORY (INHALATION) ONCE
Status: COMPLETED | OUTPATIENT
Start: 2025-03-27 | End: 2025-03-27

## 2025-03-27 RX ADMIN — ALBUTEROL SULFATE 4 PUFF: 90 AEROSOL, METERED RESPIRATORY (INHALATION) at 12:35

## 2025-03-27 ASSESSMENT — PULMONARY FUNCTION TESTS
FEV1/FVC_POST: 71
FVC_PERCENT_PREDICTED_POST: 91
FEV1_PERCENT_PREDICTED_POST: 83
FVC_PERCENT_PREDICTED_PRE: 92
FVC_PRE: 2.45
FEV1_PERCENT_PREDICTED_PRE: 78
FEV1/FVC_PRE: 65
FVC_POST: 2.40

## 2025-03-29 NOTE — PROCEDURES
Spirometry was acceptable and reproducible by ATS standards    Spirometry  Spirometry shows mild obstructive defect.    Bronchodilator  There is no response to bronchodilator demonstrated.   [Increase in FEV1 and/or FVC => 12% of control and => 200 ml]    Lung Volumes  Lung volumes are normal.    Diffusing Capacity  Diffusing capacity is normal.      Overall Interpretation  Mild COPD with an FEV1 of 78%.  No bronchodilator response.  Diffusion capacity normal.    Pulmonary Function Testing Results:    FEV1 %Pred-Pre   Date Value Ref Range Status   03/27/2025 78 % Final     FEV1 %Pred-Post   Date Value Ref Range Status   03/27/2025 83 % Final     FEV1/FVC-Pre   Date Value Ref Range Status   03/27/2025 65 % Final     FEV1/FVC-Post   Date Value Ref Range Status   03/27/2025 71 % Final     TLC Pre %Pred   Date Value Ref Range Status   03/27/2025 100 % Final     DLCO %Pred   Date Value Ref Range Status   03/27/2025 91 % Final     DLCO/VA %Pred   Date Value Ref Range Status   08/12/2022 77 % Final              Obstruction severity and Restriction Severity using FEV1 %  Gold Stage Severity per ERS/ATS FEV1 % per ERS/ATS   GOLD I:  FEV1>80% Mild COPD >70   GOLD II:  FEV1 50-79% Moderate 50-70   GOLD III:  FEV1 30-49% Severe 35-50   GOLD IV:  FEV1 <30 Very Severe  <35       DCLO:  Normal:  %  Mild:  65-70%  Moderate:  41-60%  Severe:  <40%      PFT data will be scanned into the procedure tab in epic under this encounter    Sean Carrasco MD  [facility]

## 2025-04-21 ENCOUNTER — TELEPHONE (OUTPATIENT)
Dept: VASCULAR SURGERY | Age: 75
End: 2025-04-21

## 2025-04-21 NOTE — TELEPHONE ENCOUNTER
Pt states she is needing to have a doppler done related to her 6 mo follow up, the test that is needed. She also says she is no longer taking the XARELTO. She was informed to take an Asprin with her plavix but since has had an rash, unsure if its related to the aspirin. She would like clarification on whether to double the plavix instead or continue taking the Asprin in a lower dosage.    Best call back is [phone].     Please advise.

## 2025-04-23 NOTE — TELEPHONE ENCOUNTER
Pt called back and would like to have Dr. Pulliam monitor medications and BLE ADS from now on. Spoke with Dr. Pulliam regarding this, as he will not be doing arterial clinics beginning in June. Per Dr. Pulliam, we can manage these medications and complete the ultrasound, but the pt would see Dr. Still at [facility] after June 1. Pt made aware of this and voiced understanding. Pt scheduled for BLE ADS and OV with Dr. Pulliam on 5/28/25. Ok per Dr. Pulliam to take Aspirin and Plavix as prescribed.

## 2025-04-23 NOTE — TELEPHONE ENCOUNTER
Called pt and LVM to clarify medication and doppler needs. Per her chart, Dr. Rodriguez is managing her Aspirin, Plavix, and 6 month dopplers. Would like to verify with the pt if this is still the case. If so, pt should check with Dr. Rodriguez's office regarding management of her medications and doppler. Will await return call.

## 2025-05-28 ENCOUNTER — TELEPHONE (OUTPATIENT)
Dept: VASCULAR SURGERY | Age: 75
End: 2025-05-28

## 2025-05-28 ENCOUNTER — OFFICE VISIT (OUTPATIENT)
Dept: VASCULAR SURGERY | Age: 75
End: 2025-05-28
Payer: COMMERCIAL

## 2025-05-28 VITALS
SYSTOLIC BLOOD PRESSURE: 172 MMHG | DIASTOLIC BLOOD PRESSURE: 96 MMHG | OXYGEN SATURATION: 93 % | BODY MASS INDEX: 31.65 KG/M2 | WEIGHT: 190 LBS | HEART RATE: 84 BPM | HEIGHT: 65 IN

## 2025-05-28 DIAGNOSIS — I73.9 PAD (PERIPHERAL ARTERY DISEASE): ICD-10-CM

## 2025-05-28 DIAGNOSIS — I71.23 ANEURYSM OF DESCENDING THORACIC AORTA WITHOUT RUPTURE: Primary | ICD-10-CM

## 2025-05-28 DIAGNOSIS — I73.9 PAD (PERIPHERAL ARTERY DISEASE): Primary | ICD-10-CM

## 2025-05-28 PROCEDURE — 1159F MED LIST DOCD IN RCRD: CPT | Performed by: SURGERY

## 2025-05-28 PROCEDURE — 99213 OFFICE O/P EST LOW 20 MIN: CPT | Performed by: SURGERY

## 2025-05-28 PROCEDURE — 1123F ACP DISCUSS/DSCN MKR DOCD: CPT | Performed by: SURGERY

## 2025-05-28 PROCEDURE — 3077F SYST BP >= 140 MM HG: CPT | Performed by: SURGERY

## 2025-05-28 PROCEDURE — 3080F DIAST BP >= 90 MM HG: CPT | Performed by: SURGERY

## 2025-05-28 ASSESSMENT — ENCOUNTER SYMPTOMS
CONSTIPATION: 1
BACK PAIN: 1
SHORTNESS OF BREATH: 1
COUGH: 1
ALLERGIC/IMMUNOLOGIC NEGATIVE: 1
EYES NEGATIVE: 1

## 2025-05-28 NOTE — PROGRESS NOTES
Tibial Artery: Multiphasic (normal) Doppler waveforms.   Middle Peroneal Artery: Absent Doppler waveforms.   The left KIYA is 0.82 ( mmHg,  mmHg)  Left Brachial 174 mmHg  There is normal, multiphasic flow identified in the common femoral artery, suggesting no evidence of hemodynamically significant aorto-iliac inflow disease.  There is atherosclerotic plaque involving the superficial femoral artery.  Elevated velocities ranging from  cm/sec in the proximal superficial femoral artery, consistent with >70% stenosis.  Elevated velocities ranging from  cm/sec in the distal superficial femoral artery, consistent with >70% stenosis.  There is atherosclerotic plaque involving the popliteal artery with no significantly elevated velocities, consistent with <50% stenosis.  The peroneal artery appears occluded.     Right Lower Arterial Measurements     PSV Wally Ratio   CFA Prox 198.1 cm/s          CFA Dist 107.6 cm/s          PFA Prox 79.4 cm/s          SFA Prox 97.1 cm/s       0.5         SFA Mid 288.7 cm/s       3         SFA Dist 132.8 cm/s       0.46         Pop Prox 135.8 cm/s       1.02         Pop Dist 63.3 cm/s             PTA Mid 85.2 cm/s          PTA Dist 97.5 cm/s          DOMITILA Dist 21.1 cm/s          Poppy Mid 46.9 cm/s            Left Lower Arterial Measurements     PSV Wally Ratio   CFA Prox 124.2 cm/s          CFA Dist 105.2 cm/s          PFA Prox 86.8 cm/s          SFA Prox 381.5 cm/s       3.07         SFA Mid 59.6 cm/s       0.16         SFA Dist 220 cm/s       3.69         Pop Prox 64.6 cm/s       0.29         Pop Dist 67.8 cm/s             PTA Mid 54.9 cm/s          PTA Dist 52.7 cm/s          DOMITILA Prox 19.7 cm/s          DOMITILA Mid 50.5 cm/s          DOMITILA Dist 21.9 cm/s              Assessment:      1) Asymptomatic mid-descending thoracic aortic aneurysm (4.3 cm) - stable  2) S/P open AAA repair. Doing well.  3) Bilateral SFA stenotic disease - mild symptoms S/P endovascular treatment

## 2025-05-28 NOTE — TELEPHONE ENCOUNTER
Called pt and LVM with appointment information for a follow up in January.    CTA Chest w/ Contrast:  1/14/26 9:00AM, arrive by 8:45AM  NPO 4 hours prior to the scan  Piper Chaparro    Office visit with Dr. Still:  1/19/26 1:30PM  [facility]    Advised pt to call back with any questions or if she needs to reschedule.

## (undated) DEVICE — REPLAY HEMOSTASIS CLIP, 16MM SPAN: Brand: REPLAY

## (undated) DEVICE — SUTURE PROL SZ 3-0 L48IN NONABSORBABLE BLU SH L26MM 1/2 CIR 8534H

## (undated) DEVICE — CANISTER, RIGID, 1200CC: Brand: MEDLINE INDUSTRIES, INC.

## (undated) DEVICE — DRAPE,CHEST,FENES,15X10,STERIL: Brand: MEDLINE

## (undated) DEVICE — SYRINGE MED 10ML LUERLOCK TIP W/O SFTY DISP

## (undated) DEVICE — COVER,TABLE,77X90,STERILE: Brand: MEDLINE

## (undated) DEVICE — MAJOR SET UP PK

## (undated) DEVICE — SUTURE BOOT: Brand: DEROYAL

## (undated) DEVICE — SPONGE LAP W18XL18IN WHT COT 4 PLY FLD STRUNG RADPQ DISP ST

## (undated) DEVICE — ENDOSCOPY KIT: Brand: MEDLINE INDUSTRIES, INC.

## (undated) DEVICE — ELECTRODE ES L65IN DIA3MM S STL BLDE MPLR OPN APPRCH EZ TO

## (undated) DEVICE — APPLICATOR MEDICATED 26 CC SOLUTION HI LT ORNG CHLORAPREP

## (undated) DEVICE — RETAINER VISCERA GLASSMAN FISH DISP ST

## (undated) DEVICE — SOLUTION IV 1000ML 0.9% SOD CHL PH 5 INJ USP VIAFLX PLAS

## (undated) DEVICE — SUTURE PDS II SZ 1 L96IN ABSRB VLT TP-1 L65MM 1/2 CIR Z880G

## (undated) DEVICE — SYRINGE TB 1ML NDL 27GA L0.5IN PLAS W/ SFTY LOK PERM NDL

## (undated) DEVICE — TOWEL,OR,DSP,ST,BLUE,STD,4/PK,20PK/CS: Brand: MEDLINE

## (undated) DEVICE — APPLIER CLP L L13IN TI MULT RNG HNDL 20 CLP STR LIGACLP

## (undated) DEVICE — TUBING, SUCTION, 1/4" X 12', STRAIGHT: Brand: MEDLINE

## (undated) DEVICE — ELECTRODE BLDE L6.5IN CAUT EXT DISP

## (undated) DEVICE — INTENDED FOR TISSUE SEPARATION, AND OTHER PROCEDURES THAT REQUIRE A SHARP SURGICAL BLADE TO PUNCTURE OR CUT.: Brand: BARD-PARKER ® STAINLESS STEEL BLADES

## (undated) DEVICE — NEEDLE PROC L2.75IN OD18GA WNG SGL WALL PUNC GUID WIRE FOR

## (undated) DEVICE — SUTURE PERMA-HAND SZ 2-0 L30IN NONABSORBABLE BLK L26MM SH K833H

## (undated) DEVICE — DECANTER BAG 9": Brand: MEDLINE INDUSTRIES, INC.

## (undated) DEVICE — SUTURE PERMAHAND SZ 2-0 L18IN NONABSORBABLE BLK L26MM SH C012D

## (undated) DEVICE — STAPLER SKIN H3.9MM WIRE DIA0.58MM CRWN 6.9MM 35 STPL FIX

## (undated) DEVICE — LOOP VES W1.3MM THK0.9MM MINI YEL SIL FLD REPELLENT

## (undated) DEVICE — SUTURE PERMAHAND SZ 3-0 L30IN NONABSORBABLE BLK SILK BRAID A304H

## (undated) DEVICE — SYRINGE 20ML LL S/C 50

## (undated) DEVICE — GLOVE,SURG,SENSICARE SLT,LF,PF,7: Brand: MEDLINE

## (undated) DEVICE — TOTAL TRAY, 16FR 10ML SIL FOLEY, URN: Brand: MEDLINE

## (undated) DEVICE — AGENT HEMSTAT W2XL14IN OXIDIZED REGENERATED CELOS ABSRB FOR

## (undated) DEVICE — Z DISCONTINUED USE 2425483 (LOW STOCK PER MEDLINE) TAPE UMB L18IN DIA1/8IN WHT COT NONABSORBABLE W/O NDL FOR

## (undated) DEVICE — SNARES COLD OVAL 10MM THIN

## (undated) DEVICE — SYRINGE MED 30ML STD CLR PLAS LUERLOCK TIP N CTRL DISP

## (undated) DEVICE — SUTURE PERMAHAND SZ 2-0 L30IN NONABSORBABLE BLK SILK W/O A305H

## (undated) DEVICE — SOLUTION IV IRRIG POUR BRL 0.9% SODIUM CHL 2F7124

## (undated) DEVICE — 4-PORT MANIFOLD: Brand: NEPTUNE 2

## (undated) DEVICE — AGENT HEMSTAT W4XL8IN OXIDIZED REGENERATED CELOS ABSRB

## (undated) DEVICE — COVER LT HNDL BLU PLAS

## (undated) DEVICE — APPLIER LIG CLP M L11IN TI STR RNG HNDL FOR 20 CLP DISP

## (undated) DEVICE — MERCY HEALTH WEST TURNOVER: Brand: MEDLINE INDUSTRIES, INC.

## (undated) DEVICE — SPONGE GZ W4XL4IN COT 12 PLY TYP VII WVN C FLD DSGN

## (undated) DEVICE — SUTURE NONABSORBABLE MONOFILAMENT 5-0 C-1 1X24 IN PROLENE 8725H

## (undated) DEVICE — SUTURE CHROMIC GUT SZ 2-0 L27IN ABSRB BRN L36MM CT-1 1/2 811H

## (undated) DEVICE — SURGIFOAM SPNG SZ 100

## (undated) DEVICE — ELECTRODE PT RET AD L9FT HI MOIST COND ADH HYDRGEL CORDED

## (undated) DEVICE — 3M™ IOBAN™ 2 ANTIMICROBIAL INCISE DRAPE 6650EZ: Brand: IOBAN™ 2

## (undated) DEVICE — DRAPE,INSTRUMENT,MAGNETIC,10X16: Brand: MEDLINE

## (undated) DEVICE — TOWEL,OR,DSP,ST,WHITE,DLX,XR,4/PK,20PK/C: Brand: MEDLINE

## (undated) DEVICE — SUTURE NONABSORBABLE MONOFILAMENT 5-0 C-1 4X36 IN PROLENE M8720

## (undated) DEVICE — FOGARTY - HYDRAGRIP SURGICAL - CLAMP INSERTS: Brand: FOGARTY SOFTJAW

## (undated) DEVICE — SUTURE NONABSORBABLE MONOFILAMENT 4-0 RB-1 36 IN BLU PROLENE 8557H

## (undated) DEVICE — SIZER GRAFT VASCULAR VASCUTEK DISP